# Patient Record
Sex: MALE | Race: ASIAN | NOT HISPANIC OR LATINO | URBAN - METROPOLITAN AREA
[De-identification: names, ages, dates, MRNs, and addresses within clinical notes are randomized per-mention and may not be internally consistent; named-entity substitution may affect disease eponyms.]

---

## 2021-01-22 ENCOUNTER — INPATIENT (INPATIENT)
Facility: HOSPITAL | Age: 71
LOS: 18 days | Discharge: HOME | End: 2021-02-10
Attending: INTERNAL MEDICINE | Admitting: INTERNAL MEDICINE
Payer: MEDICARE

## 2021-01-22 VITALS
DIASTOLIC BLOOD PRESSURE: 79 MMHG | RESPIRATION RATE: 24 BRPM | HEART RATE: 71 BPM | SYSTOLIC BLOOD PRESSURE: 151 MMHG | OXYGEN SATURATION: 95 % | TEMPERATURE: 99 F

## 2021-01-22 DIAGNOSIS — N40.0 BENIGN PROSTATIC HYPERPLASIA WITHOUT LOWER URINARY TRACT SYMPTOMS: ICD-10-CM

## 2021-01-22 DIAGNOSIS — I10 ESSENTIAL (PRIMARY) HYPERTENSION: ICD-10-CM

## 2021-01-22 DIAGNOSIS — E11.22 TYPE 2 DIABETES MELLITUS WITH DIABETIC CHRONIC KIDNEY DISEASE: ICD-10-CM

## 2021-01-22 DIAGNOSIS — K21.9 GASTRO-ESOPHAGEAL REFLUX DISEASE WITHOUT ESOPHAGITIS: ICD-10-CM

## 2021-01-22 DIAGNOSIS — Z29.9 ENCOUNTER FOR PROPHYLACTIC MEASURES, UNSPECIFIED: ICD-10-CM

## 2021-01-22 DIAGNOSIS — U07.1 COVID-19: ICD-10-CM

## 2021-01-22 DIAGNOSIS — E11.9 TYPE 2 DIABETES MELLITUS WITHOUT COMPLICATIONS: ICD-10-CM

## 2021-01-22 DIAGNOSIS — I25.10 ATHEROSCLEROTIC HEART DISEASE OF NATIVE CORONARY ARTERY WITHOUT ANGINA PECTORIS: ICD-10-CM

## 2021-01-22 DIAGNOSIS — E78.5 HYPERLIPIDEMIA, UNSPECIFIED: ICD-10-CM

## 2021-01-22 LAB
A1C WITH ESTIMATED AVERAGE GLUCOSE RESULT: 9.6 % — HIGH (ref 4–5.6)
ALBUMIN SERPL ELPH-MCNC: 3.6 G/DL — SIGNIFICANT CHANGE UP (ref 3.3–5)
ALBUMIN SERPL ELPH-MCNC: 3.8 G/DL — SIGNIFICANT CHANGE UP (ref 3.3–5)
ALP SERPL-CCNC: 56 U/L — SIGNIFICANT CHANGE UP (ref 40–120)
ALP SERPL-CCNC: 65 U/L — SIGNIFICANT CHANGE UP (ref 40–120)
ALT FLD-CCNC: 18 U/L — SIGNIFICANT CHANGE UP (ref 4–41)
ALT FLD-CCNC: 19 U/L — SIGNIFICANT CHANGE UP (ref 4–41)
ANION GAP SERPL CALC-SCNC: 15 MMOL/L — HIGH (ref 7–14)
ANION GAP SERPL CALC-SCNC: 24 MMOL/L — HIGH (ref 7–14)
AST SERPL-CCNC: 48 U/L — HIGH (ref 4–40)
AST SERPL-CCNC: 50 U/L — HIGH (ref 4–40)
BASOPHILS # BLD AUTO: 0.01 K/UL — SIGNIFICANT CHANGE UP (ref 0–0.2)
BASOPHILS NFR BLD AUTO: 0.1 % — SIGNIFICANT CHANGE UP (ref 0–2)
BILIRUB SERPL-MCNC: 0.6 MG/DL — SIGNIFICANT CHANGE UP (ref 0.2–1.2)
BILIRUB SERPL-MCNC: 0.8 MG/DL — SIGNIFICANT CHANGE UP (ref 0.2–1.2)
BLOOD GAS ARTERIAL COMPREHENSIVE RESULT: SIGNIFICANT CHANGE UP
BLOOD GAS ARTERIAL COMPREHENSIVE RESULT: SIGNIFICANT CHANGE UP
BLOOD GAS VENOUS COMPREHENSIVE RESULT: SIGNIFICANT CHANGE UP
BLOOD GAS VENOUS COMPREHENSIVE RESULT: SIGNIFICANT CHANGE UP
BUN SERPL-MCNC: 18 MG/DL — SIGNIFICANT CHANGE UP (ref 7–23)
BUN SERPL-MCNC: 21 MG/DL — SIGNIFICANT CHANGE UP (ref 7–23)
CALCIUM SERPL-MCNC: 9.1 MG/DL — SIGNIFICANT CHANGE UP (ref 8.4–10.5)
CALCIUM SERPL-MCNC: 9.2 MG/DL — SIGNIFICANT CHANGE UP (ref 8.4–10.5)
CHLORIDE SERPL-SCNC: 93 MMOL/L — LOW (ref 98–107)
CHLORIDE SERPL-SCNC: 97 MMOL/L — LOW (ref 98–107)
CO2 SERPL-SCNC: 16 MMOL/L — LOW (ref 22–31)
CO2 SERPL-SCNC: 22 MMOL/L — SIGNIFICANT CHANGE UP (ref 22–31)
CREAT SERPL-MCNC: 1.35 MG/DL — HIGH (ref 0.5–1.3)
CREAT SERPL-MCNC: 1.47 MG/DL — HIGH (ref 0.5–1.3)
CRP SERPL-MCNC: 210.9 MG/L — HIGH
D DIMER BLD IA.RAPID-MCNC: 244 NG/ML DDU — HIGH
D DIMER BLD IA.RAPID-MCNC: 301 NG/ML DDU — HIGH
EOSINOPHIL # BLD AUTO: 0 K/UL — SIGNIFICANT CHANGE UP (ref 0–0.5)
EOSINOPHIL NFR BLD AUTO: 0 % — SIGNIFICANT CHANGE UP (ref 0–6)
ESTIMATED AVERAGE GLUCOSE: 229 MG/DL — HIGH (ref 68–114)
FERRITIN SERPL-MCNC: 705 NG/ML — HIGH (ref 30–400)
GLUCOSE BLDC GLUCOMTR-MCNC: 378 MG/DL — HIGH (ref 70–99)
GLUCOSE BLDC GLUCOMTR-MCNC: 458 MG/DL — CRITICAL HIGH (ref 70–99)
GLUCOSE BLDC GLUCOMTR-MCNC: 463 MG/DL — CRITICAL HIGH (ref 70–99)
GLUCOSE SERPL-MCNC: 313 MG/DL — HIGH (ref 70–99)
GLUCOSE SERPL-MCNC: 488 MG/DL — CRITICAL HIGH (ref 70–99)
HCT VFR BLD CALC: 43.4 % — SIGNIFICANT CHANGE UP (ref 39–50)
HCT VFR BLD CALC: 44 % — SIGNIFICANT CHANGE UP (ref 39–50)
HGB BLD-MCNC: 13.2 G/DL — SIGNIFICANT CHANGE UP (ref 13–17)
HGB BLD-MCNC: 13.3 G/DL — SIGNIFICANT CHANGE UP (ref 13–17)
IANC: 10.55 K/UL — HIGH (ref 1.5–8.5)
IANC: 5.35 K/UL — SIGNIFICANT CHANGE UP (ref 1.5–8.5)
IMM GRANULOCYTES NFR BLD AUTO: 0.8 % — SIGNIFICANT CHANGE UP (ref 0–1.5)
LYMPHOCYTES # BLD AUTO: 1.16 K/UL — SIGNIFICANT CHANGE UP (ref 1–3.3)
LYMPHOCYTES # BLD AUTO: 16.3 % — SIGNIFICANT CHANGE UP (ref 13–44)
MAGNESIUM SERPL-MCNC: 2 MG/DL — SIGNIFICANT CHANGE UP (ref 1.6–2.6)
MCHC RBC-ENTMCNC: 25.7 PG — LOW (ref 27–34)
MCHC RBC-ENTMCNC: 26.1 PG — LOW (ref 27–34)
MCHC RBC-ENTMCNC: 30.2 GM/DL — LOW (ref 32–36)
MCHC RBC-ENTMCNC: 30.4 GM/DL — LOW (ref 32–36)
MCV RBC AUTO: 84.4 FL — SIGNIFICANT CHANGE UP (ref 80–100)
MCV RBC AUTO: 86.3 FL — SIGNIFICANT CHANGE UP (ref 80–100)
MONOCYTES # BLD AUTO: 0.54 K/UL — SIGNIFICANT CHANGE UP (ref 0–0.9)
MONOCYTES NFR BLD AUTO: 7.6 % — SIGNIFICANT CHANGE UP (ref 2–14)
NEUTROPHILS # BLD AUTO: 5.35 K/UL — SIGNIFICANT CHANGE UP (ref 1.8–7.4)
NEUTROPHILS NFR BLD AUTO: 75.2 % — SIGNIFICANT CHANGE UP (ref 43–77)
NRBC # BLD: 0 /100 WBCS — SIGNIFICANT CHANGE UP
NRBC # FLD: 0 K/UL — SIGNIFICANT CHANGE UP
NT-PROBNP SERPL-SCNC: 550 PG/ML — HIGH
PHOSPHATE SERPL-MCNC: 5.7 MG/DL — HIGH (ref 2.5–4.5)
PLATELET # BLD AUTO: 199 K/UL — SIGNIFICANT CHANGE UP (ref 150–400)
PLATELET # BLD AUTO: 269 K/UL — SIGNIFICANT CHANGE UP (ref 150–400)
POTASSIUM SERPL-MCNC: 4.3 MMOL/L — SIGNIFICANT CHANGE UP (ref 3.5–5.3)
POTASSIUM SERPL-MCNC: 4.6 MMOL/L — SIGNIFICANT CHANGE UP (ref 3.5–5.3)
POTASSIUM SERPL-SCNC: 4.3 MMOL/L — SIGNIFICANT CHANGE UP (ref 3.5–5.3)
POTASSIUM SERPL-SCNC: 4.6 MMOL/L — SIGNIFICANT CHANGE UP (ref 3.5–5.3)
PROCALCITONIN SERPL-MCNC: 6.22 NG/ML — HIGH (ref 0.02–0.1)
PROT SERPL-MCNC: 7.4 G/DL — SIGNIFICANT CHANGE UP (ref 6–8.3)
PROT SERPL-MCNC: 7.5 G/DL — SIGNIFICANT CHANGE UP (ref 6–8.3)
RBC # BLD: 5.1 M/UL — SIGNIFICANT CHANGE UP (ref 4.2–5.8)
RBC # BLD: 5.14 M/UL — SIGNIFICANT CHANGE UP (ref 4.2–5.8)
RBC # FLD: 14.4 % — SIGNIFICANT CHANGE UP (ref 10.3–14.5)
RBC # FLD: 14.5 % — SIGNIFICANT CHANGE UP (ref 10.3–14.5)
SARS-COV-2 RNA SPEC QL NAA+PROBE: DETECTED
SODIUM SERPL-SCNC: 133 MMOL/L — LOW (ref 135–145)
SODIUM SERPL-SCNC: 134 MMOL/L — LOW (ref 135–145)
TROPONIN T, HIGH SENSITIVITY RESULT: 18 NG/L — SIGNIFICANT CHANGE UP
WBC # BLD: 12.51 K/UL — HIGH (ref 3.8–10.5)
WBC # BLD: 7.12 K/UL — SIGNIFICANT CHANGE UP (ref 3.8–10.5)
WBC # FLD AUTO: 12.51 K/UL — HIGH (ref 3.8–10.5)
WBC # FLD AUTO: 7.12 K/UL — SIGNIFICANT CHANGE UP (ref 3.8–10.5)

## 2021-01-22 PROCEDURE — 99223 1ST HOSP IP/OBS HIGH 75: CPT

## 2021-01-22 PROCEDURE — 71045 X-RAY EXAM CHEST 1 VIEW: CPT | Mod: 26

## 2021-01-22 PROCEDURE — 93010 ELECTROCARDIOGRAM REPORT: CPT | Mod: 59

## 2021-01-22 PROCEDURE — 76937 US GUIDE VASCULAR ACCESS: CPT | Mod: 26

## 2021-01-22 PROCEDURE — 36620 INSERTION CATHETER ARTERY: CPT

## 2021-01-22 PROCEDURE — 71045 X-RAY EXAM CHEST 1 VIEW: CPT | Mod: 26,77

## 2021-01-22 PROCEDURE — 36556 INSERT NON-TUNNEL CV CATH: CPT

## 2021-01-22 PROCEDURE — 99285 EMERGENCY DEPT VISIT HI MDM: CPT | Mod: 25

## 2021-01-22 PROCEDURE — 31500 INSERT EMERGENCY AIRWAY: CPT | Mod: 59

## 2021-01-22 RX ORDER — INSULIN LISPRO 100/ML
VIAL (ML) SUBCUTANEOUS
Refills: 0 | Status: DISCONTINUED | OUTPATIENT
Start: 2021-01-22 | End: 2021-01-22

## 2021-01-22 RX ORDER — ENOXAPARIN SODIUM 100 MG/ML
40 INJECTION SUBCUTANEOUS DAILY
Refills: 0 | Status: DISCONTINUED | OUTPATIENT
Start: 2021-01-22 | End: 2021-02-10

## 2021-01-22 RX ORDER — CISATRACURIUM BESYLATE 2 MG/ML
20 INJECTION INTRAVENOUS ONCE
Refills: 0 | Status: COMPLETED | OUTPATIENT
Start: 2021-01-22 | End: 2021-01-22

## 2021-01-22 RX ORDER — ROCURONIUM BROMIDE 10 MG/ML
100 VIAL (ML) INTRAVENOUS ONCE
Refills: 0 | Status: COMPLETED | OUTPATIENT
Start: 2021-01-22 | End: 2021-01-22

## 2021-01-22 RX ORDER — REMDESIVIR 5 MG/ML
100 INJECTION INTRAVENOUS EVERY 24 HOURS
Refills: 0 | Status: COMPLETED | OUTPATIENT
Start: 2021-01-23 | End: 2021-01-26

## 2021-01-22 RX ORDER — GLUCAGON INJECTION, SOLUTION 0.5 MG/.1ML
1 INJECTION, SOLUTION SUBCUTANEOUS ONCE
Refills: 0 | Status: DISCONTINUED | OUTPATIENT
Start: 2021-01-22 | End: 2021-02-10

## 2021-01-22 RX ORDER — REMDESIVIR 5 MG/ML
INJECTION INTRAVENOUS
Refills: 0 | Status: COMPLETED | OUTPATIENT
Start: 2021-01-22 | End: 2021-01-26

## 2021-01-22 RX ORDER — INSULIN GLARGINE 100 [IU]/ML
50 INJECTION, SOLUTION SUBCUTANEOUS AT BEDTIME
Refills: 0 | Status: DISCONTINUED | OUTPATIENT
Start: 2021-01-22 | End: 2021-01-22

## 2021-01-22 RX ORDER — PANTOPRAZOLE SODIUM 20 MG/1
40 TABLET, DELAYED RELEASE ORAL
Refills: 0 | Status: DISCONTINUED | OUTPATIENT
Start: 2021-01-22 | End: 2021-01-22

## 2021-01-22 RX ORDER — INSULIN HUMAN 100 [IU]/ML
14 INJECTION, SOLUTION SUBCUTANEOUS
Qty: 100 | Refills: 0 | Status: DISCONTINUED | OUTPATIENT
Start: 2021-01-22 | End: 2021-01-23

## 2021-01-22 RX ORDER — SODIUM CHLORIDE 9 MG/ML
10 INJECTION INTRAMUSCULAR; INTRAVENOUS; SUBCUTANEOUS
Refills: 0 | Status: DISCONTINUED | OUTPATIENT
Start: 2021-01-22 | End: 2021-01-29

## 2021-01-22 RX ORDER — DEXTROSE 50 % IN WATER 50 %
12.5 SYRINGE (ML) INTRAVENOUS ONCE
Refills: 0 | Status: DISCONTINUED | OUTPATIENT
Start: 2021-01-22 | End: 2021-01-22

## 2021-01-22 RX ORDER — MIDAZOLAM HYDROCHLORIDE 1 MG/ML
0.02 INJECTION, SOLUTION INTRAMUSCULAR; INTRAVENOUS
Qty: 100 | Refills: 0 | Status: DISCONTINUED | OUTPATIENT
Start: 2021-01-22 | End: 2021-01-25

## 2021-01-22 RX ORDER — SODIUM CHLORIDE 9 MG/ML
1000 INJECTION, SOLUTION INTRAVENOUS
Refills: 0 | Status: DISCONTINUED | OUTPATIENT
Start: 2021-01-22 | End: 2021-01-22

## 2021-01-22 RX ORDER — AMLODIPINE BESYLATE 2.5 MG/1
2.5 TABLET ORAL DAILY
Refills: 0 | Status: DISCONTINUED | OUTPATIENT
Start: 2021-01-22 | End: 2021-01-22

## 2021-01-22 RX ORDER — TAMSULOSIN HYDROCHLORIDE 0.4 MG/1
0.4 CAPSULE ORAL AT BEDTIME
Refills: 0 | Status: DISCONTINUED | OUTPATIENT
Start: 2021-01-22 | End: 2021-02-10

## 2021-01-22 RX ORDER — INSULIN LISPRO 100/ML
VIAL (ML) SUBCUTANEOUS EVERY 6 HOURS
Refills: 0 | Status: DISCONTINUED | OUTPATIENT
Start: 2021-01-22 | End: 2021-01-22

## 2021-01-22 RX ORDER — ETOMIDATE 2 MG/ML
30 INJECTION INTRAVENOUS ONCE
Refills: 0 | Status: DISCONTINUED | OUTPATIENT
Start: 2021-01-22 | End: 2021-01-22

## 2021-01-22 RX ORDER — CHLORHEXIDINE GLUCONATE 213 G/1000ML
1 SOLUTION TOPICAL
Refills: 0 | Status: DISCONTINUED | OUTPATIENT
Start: 2021-01-22 | End: 2021-01-31

## 2021-01-22 RX ORDER — SIMVASTATIN 20 MG/1
1 TABLET, FILM COATED ORAL
Qty: 0 | Refills: 0 | DISCHARGE

## 2021-01-22 RX ORDER — DEXTROSE 50 % IN WATER 50 %
25 SYRINGE (ML) INTRAVENOUS ONCE
Refills: 0 | Status: DISCONTINUED | OUTPATIENT
Start: 2021-01-22 | End: 2021-01-22

## 2021-01-22 RX ORDER — GLUCAGON INJECTION, SOLUTION 0.5 MG/.1ML
1 INJECTION, SOLUTION SUBCUTANEOUS ONCE
Refills: 0 | Status: DISCONTINUED | OUTPATIENT
Start: 2021-01-22 | End: 2021-01-22

## 2021-01-22 RX ORDER — SIMVASTATIN 20 MG/1
20 TABLET, FILM COATED ORAL AT BEDTIME
Refills: 0 | Status: DISCONTINUED | OUTPATIENT
Start: 2021-01-22 | End: 2021-02-10

## 2021-01-22 RX ORDER — GLUCAGON INJECTION, SOLUTION 0.5 MG/.1ML
1 INJECTION, SOLUTION SUBCUTANEOUS ONCE
Refills: 0 | Status: DISCONTINUED | OUTPATIENT
Start: 2021-01-22 | End: 2021-01-29

## 2021-01-22 RX ORDER — HUMAN INSULIN 100 [IU]/ML
22 INJECTION, SUSPENSION SUBCUTANEOUS EVERY 6 HOURS
Refills: 0 | Status: DISCONTINUED | OUTPATIENT
Start: 2021-01-22 | End: 2021-01-22

## 2021-01-22 RX ORDER — SODIUM CHLORIDE 9 MG/ML
1000 INJECTION, SOLUTION INTRAVENOUS ONCE
Refills: 0 | Status: COMPLETED | OUTPATIENT
Start: 2021-01-22 | End: 2021-01-22

## 2021-01-22 RX ORDER — FUROSEMIDE 40 MG
1 TABLET ORAL
Qty: 0 | Refills: 0 | DISCHARGE

## 2021-01-22 RX ORDER — ASPIRIN/CALCIUM CARB/MAGNESIUM 324 MG
81 TABLET ORAL DAILY
Refills: 0 | Status: DISCONTINUED | OUTPATIENT
Start: 2021-01-22 | End: 2021-02-10

## 2021-01-22 RX ORDER — NOREPINEPHRINE BITARTRATE/D5W 8 MG/250ML
0.05 PLASTIC BAG, INJECTION (ML) INTRAVENOUS
Qty: 8 | Refills: 0 | Status: DISCONTINUED | OUTPATIENT
Start: 2021-01-22 | End: 2021-01-24

## 2021-01-22 RX ORDER — DEXTROSE 50 % IN WATER 50 %
15 SYRINGE (ML) INTRAVENOUS ONCE
Refills: 0 | Status: DISCONTINUED | OUTPATIENT
Start: 2021-01-22 | End: 2021-01-22

## 2021-01-22 RX ORDER — GABAPENTIN 400 MG/1
600 CAPSULE ORAL
Refills: 0 | Status: DISCONTINUED | OUTPATIENT
Start: 2021-01-22 | End: 2021-01-22

## 2021-01-22 RX ORDER — DEXAMETHASONE 0.5 MG/5ML
6 ELIXIR ORAL DAILY
Refills: 0 | Status: DISCONTINUED | OUTPATIENT
Start: 2021-01-23 | End: 2021-01-27

## 2021-01-22 RX ORDER — INSULIN LISPRO 100/ML
VIAL (ML) SUBCUTANEOUS AT BEDTIME
Refills: 0 | Status: DISCONTINUED | OUTPATIENT
Start: 2021-01-22 | End: 2021-01-22

## 2021-01-22 RX ORDER — ACETAMINOPHEN 500 MG
650 TABLET ORAL ONCE
Refills: 0 | Status: COMPLETED | OUTPATIENT
Start: 2021-01-22 | End: 2021-01-22

## 2021-01-22 RX ORDER — ACETAMINOPHEN 500 MG
650 TABLET ORAL EVERY 4 HOURS
Refills: 0 | Status: DISCONTINUED | OUTPATIENT
Start: 2021-01-22 | End: 2021-01-22

## 2021-01-22 RX ORDER — METOPROLOL TARTRATE 50 MG
100 TABLET ORAL DAILY
Refills: 0 | Status: DISCONTINUED | OUTPATIENT
Start: 2021-01-22 | End: 2021-01-22

## 2021-01-22 RX ORDER — CLOPIDOGREL BISULFATE 75 MG/1
1 TABLET, FILM COATED ORAL
Qty: 0 | Refills: 0 | DISCHARGE

## 2021-01-22 RX ORDER — DEXAMETHASONE 0.5 MG/5ML
6 ELIXIR ORAL ONCE
Refills: 0 | Status: COMPLETED | OUTPATIENT
Start: 2021-01-22 | End: 2021-01-22

## 2021-01-22 RX ORDER — CISATRACURIUM BESYLATE 2 MG/ML
3 INJECTION INTRAVENOUS
Qty: 200 | Refills: 0 | Status: DISCONTINUED | OUTPATIENT
Start: 2021-01-22 | End: 2021-01-28

## 2021-01-22 RX ORDER — GABAPENTIN 400 MG/1
1 CAPSULE ORAL
Qty: 0 | Refills: 0 | DISCHARGE

## 2021-01-22 RX ORDER — CHLORHEXIDINE GLUCONATE 213 G/1000ML
15 SOLUTION TOPICAL EVERY 12 HOURS
Refills: 0 | Status: DISCONTINUED | OUTPATIENT
Start: 2021-01-22 | End: 2021-01-31

## 2021-01-22 RX ORDER — FENOFIBRATE,MICRONIZED 130 MG
1 CAPSULE ORAL
Qty: 0 | Refills: 0 | DISCHARGE

## 2021-01-22 RX ORDER — AMLODIPINE BESYLATE 2.5 MG/1
1 TABLET ORAL
Qty: 0 | Refills: 0 | DISCHARGE

## 2021-01-22 RX ORDER — PROPOFOL 10 MG/ML
30 INJECTION, EMULSION INTRAVENOUS
Qty: 1000 | Refills: 0 | Status: DISCONTINUED | OUTPATIENT
Start: 2021-01-22 | End: 2021-01-25

## 2021-01-22 RX ORDER — INSULIN LISPRO 100/ML
15 VIAL (ML) SUBCUTANEOUS
Refills: 0 | Status: DISCONTINUED | OUTPATIENT
Start: 2021-01-22 | End: 2021-01-22

## 2021-01-22 RX ORDER — FENTANYL CITRATE 50 UG/ML
100 INJECTION INTRAVENOUS ONCE
Refills: 0 | Status: DISCONTINUED | OUTPATIENT
Start: 2021-01-22 | End: 2021-01-22

## 2021-01-22 RX ORDER — FENOFIBRATE,MICRONIZED 130 MG
145 CAPSULE ORAL DAILY
Refills: 0 | Status: DISCONTINUED | OUTPATIENT
Start: 2021-01-22 | End: 2021-02-10

## 2021-01-22 RX ORDER — TAMSULOSIN HYDROCHLORIDE 0.4 MG/1
1 CAPSULE ORAL
Qty: 0 | Refills: 0 | DISCHARGE

## 2021-01-22 RX ORDER — REMDESIVIR 5 MG/ML
200 INJECTION INTRAVENOUS EVERY 24 HOURS
Refills: 0 | Status: COMPLETED | OUTPATIENT
Start: 2021-01-22 | End: 2021-01-22

## 2021-01-22 RX ORDER — MIDAZOLAM HYDROCHLORIDE 1 MG/ML
4 INJECTION, SOLUTION INTRAMUSCULAR; INTRAVENOUS ONCE
Refills: 0 | Status: DISCONTINUED | OUTPATIENT
Start: 2021-01-22 | End: 2021-01-22

## 2021-01-22 RX ORDER — OMEPRAZOLE 10 MG/1
1 CAPSULE, DELAYED RELEASE ORAL
Qty: 0 | Refills: 0 | DISCHARGE

## 2021-01-22 RX ORDER — SIMVASTATIN 20 MG/1
40 TABLET, FILM COATED ORAL AT BEDTIME
Refills: 0 | Status: DISCONTINUED | OUTPATIENT
Start: 2021-01-22 | End: 2021-01-22

## 2021-01-22 RX ORDER — METOPROLOL TARTRATE 50 MG
1 TABLET ORAL
Qty: 0 | Refills: 0 | DISCHARGE

## 2021-01-22 RX ORDER — FENTANYL CITRATE 50 UG/ML
0.5 INJECTION INTRAVENOUS
Qty: 2500 | Refills: 0 | Status: DISCONTINUED | OUTPATIENT
Start: 2021-01-22 | End: 2021-01-24

## 2021-01-22 RX ORDER — CLOPIDOGREL BISULFATE 75 MG/1
75 TABLET, FILM COATED ORAL DAILY
Refills: 0 | Status: DISCONTINUED | OUTPATIENT
Start: 2021-01-22 | End: 2021-02-10

## 2021-01-22 RX ADMIN — MIDAZOLAM HYDROCHLORIDE 1.65 MG/KG/HR: 1 INJECTION, SOLUTION INTRAMUSCULAR; INTRAVENOUS at 19:00

## 2021-01-22 RX ADMIN — FENTANYL CITRATE 4.13 MICROGRAM(S)/KG/HR: 50 INJECTION INTRAVENOUS at 18:45

## 2021-01-22 RX ADMIN — FENTANYL CITRATE 100 MICROGRAM(S): 50 INJECTION INTRAVENOUS at 18:36

## 2021-01-22 RX ADMIN — CISATRACURIUM BESYLATE 14.9 MICROGRAM(S)/KG/MIN: 2 INJECTION INTRAVENOUS at 23:16

## 2021-01-22 RX ADMIN — REMDESIVIR 500 MILLIGRAM(S): 5 INJECTION INTRAVENOUS at 23:02

## 2021-01-22 RX ADMIN — Medication 100 MILLIGRAM(S): at 13:55

## 2021-01-22 RX ADMIN — CLOPIDOGREL BISULFATE 75 MILLIGRAM(S): 75 TABLET, FILM COATED ORAL at 13:55

## 2021-01-22 RX ADMIN — PROPOFOL 15.5 MICROGRAM(S)/KG/MIN: 10 INJECTION, EMULSION INTRAVENOUS at 22:00

## 2021-01-22 RX ADMIN — Medication 145 MILLIGRAM(S): at 13:55

## 2021-01-22 RX ADMIN — SODIUM CHLORIDE 1000 MILLILITER(S): 9 INJECTION, SOLUTION INTRAVENOUS at 10:13

## 2021-01-22 RX ADMIN — Medication 5: at 18:36

## 2021-01-22 RX ADMIN — CISATRACURIUM BESYLATE 20 MILLIGRAM(S): 2 INJECTION INTRAVENOUS at 23:16

## 2021-01-22 RX ADMIN — Medication 650 MILLIGRAM(S): at 10:12

## 2021-01-22 RX ADMIN — Medication 7.74 MICROGRAM(S)/KG/MIN: at 18:50

## 2021-01-22 RX ADMIN — Medication 6 MILLIGRAM(S): at 10:12

## 2021-01-22 RX ADMIN — AMLODIPINE BESYLATE 2.5 MILLIGRAM(S): 2.5 TABLET ORAL at 13:55

## 2021-01-22 RX ADMIN — INSULIN HUMAN 8 UNIT(S)/HR: 100 INJECTION, SOLUTION SUBCUTANEOUS at 22:06

## 2021-01-22 RX ADMIN — MIDAZOLAM HYDROCHLORIDE 4 MILLIGRAM(S): 1 INJECTION, SOLUTION INTRAMUSCULAR; INTRAVENOUS at 19:16

## 2021-01-22 RX ADMIN — PROPOFOL 15.5 MICROGRAM(S)/KG/MIN: 10 INJECTION, EMULSION INTRAVENOUS at 17:13

## 2021-01-22 NOTE — H&P ADULT - NSHPPHYSICALEXAM_GEN_ALL_CORE
Vital Signs Last 24 Hrs  T(C): 37.1 (22 Jan 2021 09:09), Max: 37.1 (22 Jan 2021 09:09)  T(F): 98.7 (22 Jan 2021 09:09), Max: 98.7 (22 Jan 2021 09:09)  HR: 68 (22 Jan 2021 10:13) (68 - 71)  BP: 171/69 (22 Jan 2021 10:13) (151/79 - 171/69)  BP(mean): --  RR: 31 (22 Jan 2021 10:13) (24 - 31)  SpO2: 94% (22 Jan 2021 10:13) (94% - 95%)    Gen- In bed, appeared uncomfortable. Speaking full sentences.   Eyes- EOMI, PERRLA, nonicteric.  EMNT- Fair dentition. MMM. No tonsilar exudates. No posterior pharynx erythema.  Neck- Supple. No masses. No tracheal deviation.  Resp- B/l rales. Good effort. No wheezes. No accessory muscle use.  CVS- RRR, S1S2, no g/r/m. No LE edema.  GI- Soft obese abd, NT, ND, +BSx4. No HSM.  MSK- No C/C. ROM intact. No crepitus.  Neuro- CN II-XII intact. Speech fluent/face symmetric. Sensation intact.  Skin- No rashes/ulcers. Warm/moist.  Psych- AAOx3. Appropriate mood/affect.

## 2021-01-22 NOTE — H&P ADULT - NSICDXPASTMEDICALHX_GEN_ALL_CORE_FT
PAST MEDICAL HISTORY:  BPH (benign prostatic hyperplasia)     CAD (coronary artery disease) stent x3 2015    CKD stage 3 secondary to diabetes     GERD (gastroesophageal reflux disease)     HLD (hyperlipidemia)     HTN (hypertension)     Type 2 diabetes mellitus

## 2021-01-22 NOTE — H&P ADULT - PROBLEM SELECTOR PLAN 1
-C/b acute hypoxic respiratory failure.  -Currently on 10L by NRB. States he is breathing slightly easier since arriving.  -Airborne iso. Prone positioning. Discussed severity of infection and concerns for decompensation. He is ok w/ intubation. He is FULL CODE.  -Cont decadron (1/22-)  -Starting remdesivir (pending wt/CrCl calculation).  -Given productive cough/high procal - will check sputum cx. Defer on abx at this time. If no improvement, can consider empiric abx coverage.  -Monitor inflammatory marker.

## 2021-01-22 NOTE — H&P ADULT - HISTORY OF PRESENT ILLNESS
CAD s/p stent (>10yr ago)  DM2  HTN  HLD      CPW [place]    No toxic habits, social ETOH  Lives children  Retired.     FHx: None. 70M with PMH CAD s/p stents x3 (2015), DM2, HTN, HLD, BPH who presents to the hospital with worsening SOB. Per pt - he started feeling unwell on Wednesday and decided to get tested for COVID, which came back positive. He denied any travels/sick contacts. He reports usually walking around his block and suspect that's how he caught it. He lives with his family, but no one else is sick. They have all tested negative so far. He has associated diarrhea that started around the same time. He also reports subjective fevers at home which resolved w/ tylenol. Because SOB has gotten worse along w/ MORTON - he decided to come to the hospital for evaluation.  Otherwise he denied sore throat, CP, palps, abd pain, N/V, dysuria. He does have a productive cough w/ grayish sputum.     ED Course:  151/79, 71,24-33, 98.7F, 95% (10L NRB)  Received Dex 6, APAP, LR 1L

## 2021-01-22 NOTE — CHART NOTE - NSCHARTNOTEFT_GEN_A_CORE
MICU Accept Note    CHIEF COMPLAINT:     HPI / INTERVAL HISTORY:  70M with PMH CAD s/p stents x3 (2015), DM2, HTN, HLD, BPH who presents to the hospital with worsening SOB. Per pt - he started feeling unwell on Wednesday and decided to get tested for COVID, which came back positive. He denied any travels/sick contacts. He reports usually walking around his block and suspect that's how he caught it. He lives with his family, but no one else is sick. They have all tested negative so far. He has associated diarrhea that started around the same time. He also reports subjective fevers at home which resolved w/ tylenol. Because SOB has gotten worse along w/ MORTON - he decided to come to the hospital for evaluation.  Otherwise he denied sore throat, CP, palps, abd pain, N/V, dysuria. He does have a productive cough w/ grayish sputum.     ED Course:  151/79, 71,24-33, 98.7F, 95% (10L NRB)  Received Dex 6, APAP, LR 1L    PAST MEDICAL & SURGICAL HISTORY:  CKD stage 3 secondary to diabetes    GERD (gastroesophageal reflux disease)  HLD (hyperlipidemia)  BPH (benign prostatic hyperplasia)  Type 2 diabetes mellitus  CAD (coronary artery disease)  stent x3 2015  HTN (hypertension)  No significant past surgical history    FAMILY HISTORY:  No pertinent family history in first degree relatives    SOCIAL HISTORY:  Lives with children.  No tobacco, drug use. Social ETOH.  Retired. Independent w/ ADLs.  HOME MEDICATIONS:      Allergies    No Known Allergies    Intolerances          REVIEW OF SYSTEMS:  [ ] Unable to assess ROS because     OBJECTIVE:  ICU Vital Signs Last 24 Hrs  T(C): 36.7 (22 Jan 2021 13:40), Max: 37.1 (22 Jan 2021 09:09)  T(F): 98 (22 Jan 2021 13:40), Max: 98.7 (22 Jan 2021 09:09)  HR: 89 (22 Jan 2021 15:58) (68 - 89)  BP: 189/74 (22 Jan 2021 15:58) (151/79 - 210/86)  BP(mean): --  ABP: --  ABP(mean): --  RR: 37 (22 Jan 2021 13:40) (24 - 37)  SpO2: 92% (22 Jan 2021 15:58) (89% - 95%)        CAPILLARY BLOOD GLUCOSE          PHYSICAL EXAM:  General:   HEENT:   Neck:   Chest/Lungs:  Heart:  Abdomen:   Extremities:   Skin:   Neuro:   Psych:     LINES:     HOSPITAL MEDICATIONS:  MEDICATIONS  (STANDING):  amLODIPine   Tablet 2.5 milliGRAM(s) Oral daily  clopidogrel Tablet 75 milliGRAM(s) Oral daily  dextrose 40% Gel 15 Gram(s) Oral once  dextrose 5%. 1000 milliLiter(s) (50 mL/Hr) IV Continuous <Continuous>  dextrose 5%. 1000 milliLiter(s) (100 mL/Hr) IV Continuous <Continuous>  dextrose 50% Injectable 25 Gram(s) IV Push once  dextrose 50% Injectable 12.5 Gram(s) IV Push once  dextrose 50% Injectable 25 Gram(s) IV Push once  enoxaparin Injectable 40 milliGRAM(s) SubCutaneous daily  fenofibrate Tablet 145 milliGRAM(s) Oral daily  gabapentin 600 milliGRAM(s) Oral two times a day  glucagon  Injectable 1 milliGRAM(s) IntraMuscular once  insulin glargine Injectable (LANTUS) 50 Unit(s) SubCutaneous at bedtime  insulin lispro (ADMELOG) corrective regimen sliding scale   SubCutaneous three times a day before meals  insulin lispro (ADMELOG) corrective regimen sliding scale   SubCutaneous at bedtime  insulin lispro Injectable (ADMELOG) 15 Unit(s) SubCutaneous three times a day before meals  metoprolol succinate  milliGRAM(s) Oral daily  pantoprazole    Tablet 40 milliGRAM(s) Oral before breakfast  remdesivir  IVPB   IV Intermittent   remdesivir  IVPB 200 milliGRAM(s) IV Intermittent every 24 hours  simvastatin 20 milliGRAM(s) Oral at bedtime  tamsulosin 0.4 milliGRAM(s) Oral at bedtime    MEDICATIONS  (PRN):  acetaminophen   Tablet .. 650 milliGRAM(s) Oral every 4 hours PRN Temp greater or equal to 38.5C (101.3F)  acetaminophen  Suppository .. 650 milliGRAM(s) Rectal every 4 hours PRN Temp greater or equal to 38.5C (101.3F)  benzonatate 100 milliGRAM(s) Oral three times a day PRN Cough      LABS:                        13.2   7.12  )-----------( 199      ( 22 Jan 2021 10:16 )             43.4     Hgb Trend: 13.2<--  01-22    134<L>  |  97<L>  |  18  ----------------------------<  313<H>  4.3   |  22  |  1.35<H>    Ca    9.2      22 Jan 2021 11:00    TPro  7.5  /  Alb  3.8  /  TBili  0.6  /  DBili  x   /  AST  48<H>  /  ALT  19  /  AlkPhos  56  01-22    Creatinine Trend: 1.35<--        Venous Blood Gas:  01-22 @ 13:19  7.38/37/33/21/60.7  VBG Lactate: 2.5  Venous Blood Gas:  01-22 @ 10:16  7.38/41/<24/22/15.7  VBG Lactate: 2.5      MICROBIOLOGY:     RADIOLOGY & ADDITIONAL TESTS:        Em Rinaldi MD  Internal Medicine PGY1  Pager: [phone] ([facility]), [phone] ([facility]) MICU Accept Note    CHIEF COMPLAINT:     HPI / INTERVAL HISTORY:  70M with PMH CAD s/p stents x3 (2015), DM2, HTN, HLD, BPH who presents to the hospital with worsening SOB. Per pt - he started feeling unwell on Wednesday and decided to get tested for COVID, which came back positive. He denied any travels/sick contacts. He reports usually walking around his block and suspect that's how he caught it. He lives with his family, but no one else is sick. They have all tested negative so far. He has associated diarrhea that started around the same time. He also reports subjective fevers at home which resolved w/ tylenol. Because SOB has gotten worse along w/ MORTON - he decided to come to the hospital for evaluation.  Otherwise he denied sore throat, CP, palps, abd pain, N/V, dysuria. He does have a productive cough w/ grayish sputum.     ED Course:  151/79, 71,24-33, 98.7F, 95% (10L NRB)  Received Dex 6, APAP, LR 1L    PAST MEDICAL & SURGICAL HISTORY:  CKD stage 3 secondary to diabetes    GERD (gastroesophageal reflux disease)  HLD (hyperlipidemia)  BPH (benign prostatic hyperplasia)  Type 2 diabetes mellitus  CAD (coronary artery disease)  stent x3 2015  HTN (hypertension)  No significant past surgical history    FAMILY HISTORY:  No pertinent family history in first degree relatives    SOCIAL HISTORY:  Lives with children.  No tobacco, drug use. Social ETOH.  Retired. Independent w/ ADLs.  HOME MEDICATIONS:      Allergies    No Known Allergies    Intolerances    REVIEW OF SYSTEMS:  Unable to obtain 2/2 intubation / sedation      OBJECTIVE:  ICU Vital Signs Last 24 Hrs  T(C): 36.7 (22 Jan 2021 13:40), Max: 37.1 (22 Jan 2021 09:09)  T(F): 98 (22 Jan 2021 13:40), Max: 98.7 (22 Jan 2021 09:09)  HR: 89 (22 Jan 2021 15:58) (68 - 89)  BP: 189/74 (22 Jan 2021 15:58) (151/79 - 210/86)  RR: 37 (22 Jan 2021 13:40) (24 - 37)  SpO2: 92% (22 Jan 2021 15:58) (89% - 95%)    CAPILLARY BLOOD GLUCOSE    PHYSICAL EXAM:  General:   HEENT:   Neck:   Chest/Lungs:  Heart:  Abdomen:   Extremities:   Skin:   Neuro:   Psych:     LINES:     HOSPITAL MEDICATIONS:  MEDICATIONS  (STANDING):  amLODIPine   Tablet 2.5 milliGRAM(s) Oral daily  clopidogrel Tablet 75 milliGRAM(s) Oral daily  dextrose 40% Gel 15 Gram(s) Oral once  dextrose 5%. 1000 milliLiter(s) (50 mL/Hr) IV Continuous <Continuous>  dextrose 5%. 1000 milliLiter(s) (100 mL/Hr) IV Continuous <Continuous>  dextrose 50% Injectable 25 Gram(s) IV Push once  dextrose 50% Injectable 12.5 Gram(s) IV Push once  dextrose 50% Injectable 25 Gram(s) IV Push once  enoxaparin Injectable 40 milliGRAM(s) SubCutaneous daily  fenofibrate Tablet 145 milliGRAM(s) Oral daily  gabapentin 600 milliGRAM(s) Oral two times a day  glucagon  Injectable 1 milliGRAM(s) IntraMuscular once  insulin glargine Injectable (LANTUS) 50 Unit(s) SubCutaneous at bedtime  insulin lispro (ADMELOG) corrective regimen sliding scale   SubCutaneous three times a day before meals  insulin lispro (ADMELOG) corrective regimen sliding scale   SubCutaneous at bedtime  insulin lispro Injectable (ADMELOG) 15 Unit(s) SubCutaneous three times a day before meals  metoprolol succinate  milliGRAM(s) Oral daily  pantoprazole    Tablet 40 milliGRAM(s) Oral before breakfast  remdesivir  IVPB   IV Intermittent   remdesivir  IVPB 200 milliGRAM(s) IV Intermittent every 24 hours  simvastatin 20 milliGRAM(s) Oral at bedtime  tamsulosin 0.4 milliGRAM(s) Oral at bedtime    MEDICATIONS  (PRN):  acetaminophen   Tablet .. 650 milliGRAM(s) Oral every 4 hours PRN Temp greater or equal to 38.5C (101.3F)  acetaminophen  Suppository .. 650 milliGRAM(s) Rectal every 4 hours PRN Temp greater or equal to 38.5C (101.3F)  benzonatate 100 milliGRAM(s) Oral three times a day PRN Cough      LABS:                        13.2   7.12  )-----------( 199      ( 22 Jan 2021 10:16 )             43.4     Hgb Trend: 13.2<--  01-22    134<L>  |  97<L>  |  18  ----------------------------<  313<H>  4.3   |  22  |  1.35<H>    Ca    9.2      22 Jan 2021 11:00    TPro  7.5  /  Alb  3.8  /  TBili  0.6  /  DBili  x   /  AST  48<H>  /  ALT  19  /  AlkPhos  56  01-22    Creatinine Trend: 1.35<--        Venous Blood Gas:  01-22 @ 13:19  7.38/37/33/21/60.7  VBG Lactate: 2.5  Venous Blood Gas:  01-22 @ 10:16  7.38/41/<24/22/15.7  VBG Lactate: 2.5      MICROBIOLOGY:     RADIOLOGY & ADDITIONAL TESTS:    ASSESSMENT & PLAN:     NEUROLOGIC:  - Sedation:     SKIN:  - Lines:  - Decubiti:    GI:  - Diet:  - Bowel regiment:    Urinary tract:  - UO:   - Oswald:       METABOLIC:  BMP showing evidence of   Liver functions tests show   Endocrine abnormalities include  01-22    134<L>  |  97<L>  |  18  ----------------------------<  313<H>  4.3   |  22  |  1.35<H>    Ca    9.2      22 Jan 2021 11:00    TPro  7.5  /  Alb  3.8  /  TBili  0.6  /  DBili  x   /  AST  48<H>  /  ALT  19  /  AlkPhos  56  01-22      VOLUME ASSESSMENT:  No peripheral edema  No evidence of disturbance of effective circulating volume    HEMATOLOGIC:  CBC results show  Coag panel shows  DVT prophylaxis with                         13.2   7.12  )-----------( 199      ( 22 Jan 2021 10:16 )             43.4         INFECTIOUS DISEASE:  Pt without strong objective or clinical evidence of infection. Will observe off antibiotics    HEMODYNAMICS:  Patient is on pressors due to ____ shock (cardiogenic due to muscle or valve failure, obstructive due to peff, PE, PTX, hypovolemic, vasopegic)     CARDIOVASCULAR:      RESPIRATORY:  # Hypoxic Respiratory Failure 2/2 COVID-19 PNA  - Intubated 1/22  - Vent Settings:   - MICU Accept Note    CHIEF COMPLAINT:     HPI / INTERVAL HISTORY:  70M with PMH CAD s/p stents x3 (2015), DM2, HTN, HLD, BPH who presents to the hospital with worsening SOB. Per pt - he started feeling unwell on Wednesday and decided to get tested for COVID, which came back positive. He denied any travels/sick contacts. He reports usually walking around his block and suspect that's how he caught it. He lives with his family, but no one else is sick. They have all tested negative so far. He has associated diarrhea that started around the same time. He also reports subjective fevers at home which resolved w/ tylenol. Because SOB has gotten worse along w/ MORTON - he decided to come to the hospital for evaluation.  Otherwise he denied sore throat, CP, palps, abd pain, N/V, dysuria. He does have a productive cough w/ grayish sputum.     ED Course:  151/79, 71,24-33, 98.7F, 95% (10L NRB)  Received Dex 6, APAP, LR 1L    PAST MEDICAL & SURGICAL HISTORY:  CKD stage 3 secondary to diabetes    GERD (gastroesophageal reflux disease)  HLD (hyperlipidemia)  BPH (benign prostatic hyperplasia)  Type 2 diabetes mellitus  CAD (coronary artery disease)  stent x3 2015  HTN (hypertension)  No significant past surgical history    FAMILY HISTORY:  No pertinent family history in first degree relatives    SOCIAL HISTORY:  Lives with children.  No tobacco, drug use. Social ETOH.  Retired. Independent w/ ADLs.  HOME MEDICATIONS:      Allergies  No Known Allergies    Intolerances    REVIEW OF SYSTEMS:  Unable to obtain 2/2 intubation / sedation      OBJECTIVE:  ICU Vital Signs Last 24 Hrs  T(C): 36.7 (22 Jan 2021 13:40), Max: 37.1 (22 Jan 2021 09:09)  T(F): 98 (22 Jan 2021 13:40), Max: 98.7 (22 Jan 2021 09:09)  HR: 89 (22 Jan 2021 15:58) (68 - 89)  BP: 189/74 (22 Jan 2021 15:58) (151/79 - 210/86)  RR: 37 (22 Jan 2021 13:40) (24 - 37)  SpO2: 92% (22 Jan 2021 15:58) (89% - 95%)    CAPILLARY BLOOD GLUCOSE    PHYSICAL EXAM:  General:   HEENT:   Neck:   Chest/Lungs:  Heart:  Abdomen:   Extremities:   Skin:   Neuro:   Psych:     LINES:     HOSPITAL MEDICATIONS:  MEDICATIONS  (STANDING):  amLODIPine   Tablet 2.5 milliGRAM(s) Oral daily  clopidogrel Tablet 75 milliGRAM(s) Oral daily  dextrose 40% Gel 15 Gram(s) Oral once  dextrose 5%. 1000 milliLiter(s) (50 mL/Hr) IV Continuous <Continuous>  dextrose 5%. 1000 milliLiter(s) (100 mL/Hr) IV Continuous <Continuous>  dextrose 50% Injectable 25 Gram(s) IV Push once  dextrose 50% Injectable 12.5 Gram(s) IV Push once  dextrose 50% Injectable 25 Gram(s) IV Push once  enoxaparin Injectable 40 milliGRAM(s) SubCutaneous daily  fenofibrate Tablet 145 milliGRAM(s) Oral daily  gabapentin 600 milliGRAM(s) Oral two times a day  glucagon  Injectable 1 milliGRAM(s) IntraMuscular once  insulin glargine Injectable (LANTUS) 50 Unit(s) SubCutaneous at bedtime  insulin lispro (ADMELOG) corrective regimen sliding scale   SubCutaneous three times a day before meals  insulin lispro (ADMELOG) corrective regimen sliding scale   SubCutaneous at bedtime  insulin lispro Injectable (ADMELOG) 15 Unit(s) SubCutaneous three times a day before meals  metoprolol succinate  milliGRAM(s) Oral daily  pantoprazole    Tablet 40 milliGRAM(s) Oral before breakfast  remdesivir  IVPB   IV Intermittent   remdesivir  IVPB 200 milliGRAM(s) IV Intermittent every 24 hours  simvastatin 20 milliGRAM(s) Oral at bedtime  tamsulosin 0.4 milliGRAM(s) Oral at bedtime    MEDICATIONS  (PRN):  acetaminophen   Tablet .. 650 milliGRAM(s) Oral every 4 hours PRN Temp greater or equal to 38.5C (101.3F)  acetaminophen  Suppository .. 650 milliGRAM(s) Rectal every 4 hours PRN Temp greater or equal to 38.5C (101.3F)  benzonatate 100 milliGRAM(s) Oral three times a day PRN Cough      LABS:                        13.2   7.12  )-----------( 199      ( 22 Jan 2021 10:16 )             43.4     Hgb Trend: 13.2<--  01-22    134<L>  |  97<L>  |  18  ----------------------------<  313<H>  4.3   |  22  |  1.35<H>    Ca    9.2      22 Jan 2021 11:00    TPro  7.5  /  Alb  3.8  /  TBili  0.6  /  DBili  x   /  AST  48<H>  /  ALT  19  /  AlkPhos  56  01-22    Creatinine Trend: 1.35<--        Venous Blood Gas:  01-22 @ 13:19  7.38/37/33/21/60.7  VBG Lactate: 2.5  Venous Blood Gas:  01-22 @ 10:16  7.38/41/<24/22/15.7  VBG Lactate: 2.5      MICROBIOLOGY:     RADIOLOGY & ADDITIONAL TESTS:    ASSESSMENT & PLAN:     NEUROLOGIC:  - Sedation:     SKIN:  - Lines:  - Decubiti:    GI:  - Diet:  - Bowel regiment:    Urinary tract:  - UO:   - Oswald:       METABOLIC:  BMP showing evidence of   Liver functions tests show   Endocrine abnormalities include  01-22    134<L>  |  97<L>  |  18  ----------------------------<  313<H>  4.3   |  22  |  1.35<H>    Ca    9.2      22 Jan 2021 11:00    TPro  7.5  /  Alb  3.8  /  TBili  0.6  /  DBili  x   /  AST  48<H>  /  ALT  19  /  AlkPhos  56  01-22      VOLUME ASSESSMENT:  No peripheral edema  No evidence of disturbance of effective circulating volume    HEMATOLOGIC:  CBC results show  Coag panel shows  DVT prophylaxis with                         13.2   7.12  )-----------( 199      ( 22 Jan 2021 10:16 )             43.4         INFECTIOUS DISEASE:  Pt without strong objective or clinical evidence of infection. Will observe off antibiotics    HEMODYNAMICS:  Patient is on pressors due to ____ shock (cardiogenic due to muscle or valve failure, obstructive due to peff, PE, PTX, hypovolemic, vasopegic)     CARDIOVASCULAR:      RESPIRATORY:  # Hypoxic Respiratory Failure 2/2 COVID-19 PNA  - Intubated 1/22  - Vent Settings:   - MICU Accept Note    CHIEF COMPLAINT:     HPI / INTERVAL HISTORY:  70M with PMH CAD s/p stents x3 (2015), DM2, HTN, HLD, BPH who presents to the hospital with worsening SOB. Per pt - he started feeling unwell on Wednesday and decided to get tested for COVID, which came back positive. He denied any travels/sick contacts. He reports usually walking around his block and suspect that's how he caught it. He lives with his family, but no one else is sick. They have all tested negative so far. He has associated diarrhea that started around the same time. He also reports subjective fevers at home which resolved w/ tylenol. Because SOB has gotten worse along w/ MORTON - he decided to come to the hospital for evaluation.  Otherwise he denied sore throat, CP, palps, abd pain, N/V, dysuria. He does have a productive cough w/ grayish sputum.     ED Course:  151/79, 71,24-33, 98.7F, 95% (10L NRB)  Received Dex 6, APAP, LR 1L    PAST MEDICAL & SURGICAL HISTORY:  CKD stage 3 secondary to diabetes    GERD (gastroesophageal reflux disease)  HLD (hyperlipidemia)  BPH (benign prostatic hyperplasia)  Type 2 diabetes mellitus  CAD (coronary artery disease)  stent x3 2015  HTN (hypertension)  No significant past surgical history    FAMILY HISTORY:  No pertinent family history in first degree relatives    SOCIAL HISTORY:  Lives with children.  No tobacco, drug use. Social ETOH.  Retired. Independent w/ ADLs.  HOME MEDICATIONS:      Allergies  No Known Allergies    Intolerances    REVIEW OF SYSTEMS:  Unable to obtain 2/2 intubation / sedation      OBJECTIVE:  ICU Vital Signs Last 24 Hrs  T(C): 36.7 (22 Jan 2021 13:40), Max: 37.1 (22 Jan 2021 09:09)  T(F): 98 (22 Jan 2021 13:40), Max: 98.7 (22 Jan 2021 09:09)  HR: 89 (22 Jan 2021 15:58) (68 - 89)  BP: 189/74 (22 Jan 2021 15:58) (151/79 - 210/86)  RR: 37 (22 Jan 2021 13:40) (24 - 37)  SpO2: 92% (22 Jan 2021 15:58) (89% - 95%)    CAPILLARY BLOOD GLUCOSE    PHYSICAL EXAM:  General: Ill appearing man  HEENT: EOMI, PERRL  Neck: Supple  Chest/Lungs: Tachypneic, coarse breath sounds b/l, increased work of breathing with accessory muscle use  Heart: Regular rate, +S1/S2, no rubs or murmurs   Abdomen: +BS, soft, non-tender, non-distended  Extremities: No KATIE  Skin: Warm, dry  Neuro: AOx3    LINES:     HOSPITAL MEDICATIONS:  MEDICATIONS  (STANDING):  amLODIPine   Tablet 2.5 milliGRAM(s) Oral daily  clopidogrel Tablet 75 milliGRAM(s) Oral daily  dextrose 40% Gel 15 Gram(s) Oral once  dextrose 5%. 1000 milliLiter(s) (50 mL/Hr) IV Continuous <Continuous>  dextrose 5%. 1000 milliLiter(s) (100 mL/Hr) IV Continuous <Continuous>  dextrose 50% Injectable 25 Gram(s) IV Push once  dextrose 50% Injectable 12.5 Gram(s) IV Push once  dextrose 50% Injectable 25 Gram(s) IV Push once  enoxaparin Injectable 40 milliGRAM(s) SubCutaneous daily  fenofibrate Tablet 145 milliGRAM(s) Oral daily  gabapentin 600 milliGRAM(s) Oral two times a day  glucagon  Injectable 1 milliGRAM(s) IntraMuscular once  insulin glargine Injectable (LANTUS) 50 Unit(s) SubCutaneous at bedtime  insulin lispro (ADMELOG) corrective regimen sliding scale   SubCutaneous three times a day before meals  insulin lispro (ADMELOG) corrective regimen sliding scale   SubCutaneous at bedtime  insulin lispro Injectable (ADMELOG) 15 Unit(s) SubCutaneous three times a day before meals  metoprolol succinate  milliGRAM(s) Oral daily  pantoprazole    Tablet 40 milliGRAM(s) Oral before breakfast  remdesivir  IVPB   IV Intermittent   remdesivir  IVPB 200 milliGRAM(s) IV Intermittent every 24 hours  simvastatin 20 milliGRAM(s) Oral at bedtime  tamsulosin 0.4 milliGRAM(s) Oral at bedtime    MEDICATIONS  (PRN):  acetaminophen   Tablet .. 650 milliGRAM(s) Oral every 4 hours PRN Temp greater or equal to 38.5C (101.3F)  acetaminophen  Suppository .. 650 milliGRAM(s) Rectal every 4 hours PRN Temp greater or equal to 38.5C (101.3F)  benzonatate 100 milliGRAM(s) Oral three times a day PRN Cough      LABS:                        13.2   7.12  )-----------( 199      ( 22 Jan 2021 10:16 )             43.4     Hgb Trend: 13.2<--  01-22    134<L>  |  97<L>  |  18  ----------------------------<  313<H>  4.3   |  22  |  1.35<H>    Ca    9.2      22 Jan 2021 11:00    TPro  7.5  /  Alb  3.8  /  TBili  0.6  /  DBili  x   /  AST  48<H>  /  ALT  19  /  AlkPhos  56  01-22    Creatinine Trend: 1.35<--        Venous Blood Gas:  01-22 @ 13:19  7.38/37/33/21/60.7  VBG Lactate: 2.5  Venous Blood Gas:  01-22 @ 10:16  7.38/41/<24/22/15.7  VBG Lactate: 2.5      MICROBIOLOGY:     RADIOLOGY & ADDITIONAL TESTS:  < from: Xray Chest 1 View- PORTABLE-Urgent (01.22.21 @ 11:03) >    INTERPRETATION:  CLINICAL INDICATION: dyspnea cough fever    EXAM:  Portable frontal views of the chest from 1/22/2021 at 1103. No prior chest x-ray available at this institution for comparison.    IMPRESSION:  Elevated right hemidiaphragm.    Extensive patchy indistinct groundglass opacities in bilateral peripheral and lower lungs.    No pleural effusions or pneumothorax.    Cardiac and mediastinal silhouettes grossly within normal limits for the projection.    Trachea midline.    Unremarkable osseous structures.    < end of copied text >        ASSESSMENT & PLAN:   70M with PMH CAD s/p stents x3 (2015), DM2, HTN, HLD, BPH admitted 1/22/2021 for acute hypoxic respiratory failure 2' COVID-19 PNA transferred to MICU s/p intubation 1/22/2021.        NEUROLOGIC:  - Sedation:     CARDIOVASCULAR:  - No peripheral edema  - No evidence of disturbance of effective circulating volume  - No pressor requirement at this time    RESPIRATORY:  # Hypoxic Respiratory Failure 2/2 COVID-19 PNA  - Intubated 1/22  - Vent Settings:     INFECTIOUS DISEASE:  - (+) COVID-19 PNA  - Dexamethasone 6mg (1/22 - )   - Remdesivir (1/22 - )     GI:  - Diet:  - Bowel regiment:    Urinary tract:  - UO:   - Oswald:     HEMATOLOGIC:  - Coag panel shows  - DVT prophylaxis with     SKIN:  - Lines:  - Decubiti: MICU Accept Note    CHIEF COMPLAINT:     HPI / INTERVAL HISTORY:  70M with PMH CAD s/p stents x3 (2015), DM2, HTN, HLD, BPH who presents to the hospital with worsening SOB. Per pt - he started feeling unwell on Wednesday and decided to get tested for COVID, which came back positive. He denied any travels/sick contacts. He reports usually walking around his block and suspect that's how he caught it. He lives with his family, but no one else is sick. They have all tested negative so far. He has associated diarrhea that started around the same time. He also reports subjective fevers at home which resolved w/ tylenol. Because SOB has gotten worse along w/ MORTON - he decided to come to the hospital for evaluation.  Otherwise he denied sore throat, CP, palps, abd pain, N/V, dysuria. He does have a productive cough w/ grayish sputum.     ED Course:  151/79, 71,24-33, 98.7F, 95% (10L NRB)  Received Dex 6, APAP, LR 1L  Intubated for increased work of breathing associated with hypoxia     PAST MEDICAL & SURGICAL HISTORY:  CKD stage 3 secondary to diabetes    GERD (gastroesophageal reflux disease)  HLD (hyperlipidemia)  BPH (benign prostatic hyperplasia)  Type 2 diabetes mellitus  CAD (coronary artery disease)  stent x3 2015  HTN (hypertension)  No significant past surgical history    FAMILY HISTORY:  No pertinent family history in first degree relatives    SOCIAL HISTORY:  Lives with children.  No tobacco, drug use. Social ETOH.  Retired. Independent w/ ADLs.  HOME MEDICATIONS:      Allergies  No Known Allergies    Intolerances    REVIEW OF SYSTEMS:  Unable to obtain 2/2 intubation / sedation      OBJECTIVE:  ICU Vital Signs Last 24 Hrs  T(C): 36.7 (22 Jan 2021 13:40), Max: 37.1 (22 Jan 2021 09:09)  T(F): 98 (22 Jan 2021 13:40), Max: 98.7 (22 Jan 2021 09:09)  HR: 89 (22 Jan 2021 15:58) (68 - 89)  BP: 189/74 (22 Jan 2021 15:58) (151/79 - 210/86)  RR: 37 (22 Jan 2021 13:40) (24 - 37)  SpO2: 92% (22 Jan 2021 15:58) (89% - 95%)    CAPILLARY BLOOD GLUCOSE    PHYSICAL EXAM:  General: Ill appearing man  HEENT: EOMI, PERRL  Neck: Supple  Chest/Lungs: Tachypneic, coarse breath sounds b/l, increased work of breathing with accessory muscle use  Heart: Regular rate, +S1/S2, no rubs or murmurs   Abdomen: +BS, soft, non-tender, non-distended  Extremities: No KATIE  Skin: Warm, dry  Neuro: AOx3    LINES:     HOSPITAL MEDICATIONS:  MEDICATIONS  (STANDING):  amLODIPine   Tablet 2.5 milliGRAM(s) Oral daily  clopidogrel Tablet 75 milliGRAM(s) Oral daily  dextrose 40% Gel 15 Gram(s) Oral once  dextrose 5%. 1000 milliLiter(s) (50 mL/Hr) IV Continuous <Continuous>  dextrose 5%. 1000 milliLiter(s) (100 mL/Hr) IV Continuous <Continuous>  dextrose 50% Injectable 25 Gram(s) IV Push once  dextrose 50% Injectable 12.5 Gram(s) IV Push once  dextrose 50% Injectable 25 Gram(s) IV Push once  enoxaparin Injectable 40 milliGRAM(s) SubCutaneous daily  fenofibrate Tablet 145 milliGRAM(s) Oral daily  gabapentin 600 milliGRAM(s) Oral two times a day  glucagon  Injectable 1 milliGRAM(s) IntraMuscular once  insulin glargine Injectable (LANTUS) 50 Unit(s) SubCutaneous at bedtime  insulin lispro (ADMELOG) corrective regimen sliding scale   SubCutaneous three times a day before meals  insulin lispro (ADMELOG) corrective regimen sliding scale   SubCutaneous at bedtime  insulin lispro Injectable (ADMELOG) 15 Unit(s) SubCutaneous three times a day before meals  metoprolol succinate  milliGRAM(s) Oral daily  pantoprazole    Tablet 40 milliGRAM(s) Oral before breakfast  remdesivir  IVPB   IV Intermittent   remdesivir  IVPB 200 milliGRAM(s) IV Intermittent every 24 hours  simvastatin 20 milliGRAM(s) Oral at bedtime  tamsulosin 0.4 milliGRAM(s) Oral at bedtime    MEDICATIONS  (PRN):  acetaminophen   Tablet .. 650 milliGRAM(s) Oral every 4 hours PRN Temp greater or equal to 38.5C (101.3F)  acetaminophen  Suppository .. 650 milliGRAM(s) Rectal every 4 hours PRN Temp greater or equal to 38.5C (101.3F)  benzonatate 100 milliGRAM(s) Oral three times a day PRN Cough      LABS:                        13.2   7.12  )-----------( 199      ( 22 Jan 2021 10:16 )             43.4     Hgb Trend: 13.2<--  01-22    134<L>  |  97<L>  |  18  ----------------------------<  313<H>  4.3   |  22  |  1.35<H>    Ca    9.2      22 Jan 2021 11:00    TPro  7.5  /  Alb  3.8  /  TBili  0.6  /  DBili  x   /  AST  48<H>  /  ALT  19  /  AlkPhos  56  01-22    Creatinine Trend: 1.35<--        Venous Blood Gas:  01-22 @ 13:19  7.38/37/33/21/60.7  VBG Lactate: 2.5  Venous Blood Gas:  01-22 @ 10:16  7.38/41/<24/22/15.7  VBG Lactate: 2.5      MICROBIOLOGY:     RADIOLOGY & ADDITIONAL TESTS:  < from: Xray Chest 1 View- PORTABLE-Urgent (01.22.21 @ 11:03) >    INTERPRETATION:  CLINICAL INDICATION: dyspnea cough fever    EXAM:  Portable frontal views of the chest from 1/22/2021 at 1103. No prior chest x-ray available at this institution for comparison.    IMPRESSION:  Elevated right hemidiaphragm.    Extensive patchy indistinct groundglass opacities in bilateral peripheral and lower lungs.    No pleural effusions or pneumothorax.    Cardiac and mediastinal silhouettes grossly within normal limits for the projection.    Trachea midline.    Unremarkable osseous structures.    < end of copied text >        ASSESSMENT & PLAN:   70M with PMH CAD s/p stents x3 (2015), DM2, HTN, HLD, BPH admitted 1/22/2021 for acute hypoxic respiratory failure 2' COVID-19 PNA transferred to MICU s/p intubation 1/22/2021.        NEUROLOGIC:  - Sedation:     CARDIOVASCULAR:  - No peripheral edema  - No evidence of disturbance of effective circulating volume  - No pressor requirement at this time    RESPIRATORY:  # Hypoxic Respiratory Failure 2/2 COVID-19 PNA  - Intubated 1/22  - Vent Settings:     INFECTIOUS DISEASE:  - (+) COVID-19 PNA  - Dexamethasone 6mg (1/22 - )   - Remdesivir (1/22 - )     GI:  - Diet:  - Bowel regiment:    Urinary tract:  - UO:   - Oswald:     HEMATOLOGIC:  - Coag panel shows  - DVT prophylaxis with     SKIN:  - Lines:  - Decubiti: MICU Accept Note    CHIEF COMPLAINT:     HPI / INTERVAL HISTORY: The patient is a 70-year-old man with a past medical history of hypertension, hyperlipidemia, and type II diabetes mellitus who presented to the emergency department today with shortness of breath for the past three days. Per chart review, the patient has noted progressive shortness of breath with exertion       70M with PMH CAD s/p stents x3 (2015), DM2, HTN, HLD, BPH who presents to the hospital with worsening SOB. Per pt - he started feeling unwell on Wednesday and decided to get tested for COVID, which came back positive. He denied any travels/sick contacts. He reports usually walking around his block and suspect that's how he caught it. He lives with his family, but no one else is sick. They have all tested negative so far. He has associated diarrhea that started around the same time. He also reports subjective fevers at home which resolved w/ tylenol. Because SOB has gotten worse along w/ MORTON - he decided to come to the hospital for evaluation.  Otherwise he denied sore throat, CP, palps, abd pain, N/V, dysuria. He does have a productive cough w/ grayish sputum.     ED Course:  151/79, 71,24-33, 98.7F, 95% (10L NRB)  Received Dex 6, APAP, LR 1L  Intubated for increased work of breathing associated with hypoxia     PAST MEDICAL & SURGICAL HISTORY:  CKD stage 3 secondary to diabetes    GERD (gastroesophageal reflux disease)  HLD (hyperlipidemia)  BPH (benign prostatic hyperplasia)  Type 2 diabetes mellitus  CAD (coronary artery disease)  stent x3 2015  HTN (hypertension)  No significant past surgical history    FAMILY HISTORY:  No pertinent family history in first degree relatives    SOCIAL HISTORY:  Lives with children.  No tobacco, drug use. Social ETOH.  Retired. Independent w/ ADLs.  HOME MEDICATIONS:      Allergies  No Known Allergies    Intolerances    REVIEW OF SYSTEMS:  Unable to obtain 2/2 intubation / sedation      OBJECTIVE:  ICU Vital Signs Last 24 Hrs  T(C): 36.7 (22 Jan 2021 13:40), Max: 37.1 (22 Jan 2021 09:09)  T(F): 98 (22 Jan 2021 13:40), Max: 98.7 (22 Jan 2021 09:09)  HR: 89 (22 Jan 2021 15:58) (68 - 89)  BP: 189/74 (22 Jan 2021 15:58) (151/79 - 210/86)  RR: 37 (22 Jan 2021 13:40) (24 - 37)  SpO2: 92% (22 Jan 2021 15:58) (89% - 95%)    CAPILLARY BLOOD GLUCOSE    PHYSICAL EXAM:  General: Ill appearing man  HEENT: EOMI, PERRL  Neck: Supple  Chest/Lungs: Tachypneic, coarse breath sounds b/l, increased work of breathing with accessory muscle use  Heart: Regular rate, +S1/S2, no rubs or murmurs   Abdomen: +BS, soft, non-tender, non-distended  Extremities: No KATIE  Skin: Warm, dry  Neuro: AOx3    LINES:     HOSPITAL MEDICATIONS:  MEDICATIONS  (STANDING):  amLODIPine   Tablet 2.5 milliGRAM(s) Oral daily  clopidogrel Tablet 75 milliGRAM(s) Oral daily  dextrose 40% Gel 15 Gram(s) Oral once  dextrose 5%. 1000 milliLiter(s) (50 mL/Hr) IV Continuous <Continuous>  dextrose 5%. 1000 milliLiter(s) (100 mL/Hr) IV Continuous <Continuous>  dextrose 50% Injectable 25 Gram(s) IV Push once  dextrose 50% Injectable 12.5 Gram(s) IV Push once  dextrose 50% Injectable 25 Gram(s) IV Push once  enoxaparin Injectable 40 milliGRAM(s) SubCutaneous daily  fenofibrate Tablet 145 milliGRAM(s) Oral daily  gabapentin 600 milliGRAM(s) Oral two times a day  glucagon  Injectable 1 milliGRAM(s) IntraMuscular once  insulin glargine Injectable (LANTUS) 50 Unit(s) SubCutaneous at bedtime  insulin lispro (ADMELOG) corrective regimen sliding scale   SubCutaneous three times a day before meals  insulin lispro (ADMELOG) corrective regimen sliding scale   SubCutaneous at bedtime  insulin lispro Injectable (ADMELOG) 15 Unit(s) SubCutaneous three times a day before meals  metoprolol succinate  milliGRAM(s) Oral daily  pantoprazole    Tablet 40 milliGRAM(s) Oral before breakfast  remdesivir  IVPB   IV Intermittent   remdesivir  IVPB 200 milliGRAM(s) IV Intermittent every 24 hours  simvastatin 20 milliGRAM(s) Oral at bedtime  tamsulosin 0.4 milliGRAM(s) Oral at bedtime    MEDICATIONS  (PRN):  acetaminophen   Tablet .. 650 milliGRAM(s) Oral every 4 hours PRN Temp greater or equal to 38.5C (101.3F)  acetaminophen  Suppository .. 650 milliGRAM(s) Rectal every 4 hours PRN Temp greater or equal to 38.5C (101.3F)  benzonatate 100 milliGRAM(s) Oral three times a day PRN Cough      LABS:                        13.2   7.12  )-----------( 199      ( 22 Jan 2021 10:16 )             43.4     Hgb Trend: 13.2<--  01-22    134<L>  |  97<L>  |  18  ----------------------------<  313<H>  4.3   |  22  |  1.35<H>    Ca    9.2      22 Jan 2021 11:00    TPro  7.5  /  Alb  3.8  /  TBili  0.6  /  DBili  x   /  AST  48<H>  /  ALT  19  /  AlkPhos  56  01-22    Creatinine Trend: 1.35<--        Venous Blood Gas:  01-22 @ 13:19  7.38/37/33/21/60.7  VBG Lactate: 2.5  Venous Blood Gas:  01-22 @ 10:16  7.38/41/<24/22/15.7  VBG Lactate: 2.5      MICROBIOLOGY:     RADIOLOGY & ADDITIONAL TESTS:  < from: Xray Chest 1 View- PORTABLE-Urgent (01.22.21 @ 11:03) >    INTERPRETATION:  CLINICAL INDICATION: dyspnea cough fever    EXAM:  Portable frontal views of the chest from 1/22/2021 at 1103. No prior chest x-ray available at this institution for comparison.    IMPRESSION:  Elevated right hemidiaphragm.    Extensive patchy indistinct groundglass opacities in bilateral peripheral and lower lungs.    No pleural effusions or pneumothorax.    Cardiac and mediastinal silhouettes grossly within normal limits for the projection.    Trachea midline.    Unremarkable osseous structures.    < end of copied text >        ASSESSMENT & PLAN:   70M with PMH CAD s/p stents x3 (2015), DM2, HTN, HLD, BPH admitted 1/22/2021 for acute hypoxic respiratory failure 2' COVID-19 PNA transferred to MICU s/p intubation 1/22/2021.        NEUROLOGIC:  - Sedation:     CARDIOVASCULAR:  - No peripheral edema  - No evidence of disturbance of effective circulating volume  - No pressor requirement at this time    RESPIRATORY:  # Hypoxic Respiratory Failure 2/2 COVID-19 PNA  - Intubated 1/22  - Vent Settings:     INFECTIOUS DISEASE:  - (+) COVID-19 PNA  - Dexamethasone 6mg (1/22 - )   - Remdesivir (1/22 - )     GI:  - Diet:  - Bowel regiment:    Urinary tract:  - UO:   - Oswlad:     HEMATOLOGIC:  - Coag panel shows  - DVT prophylaxis with     SKIN:  - Lines:  - Decubiti: MICU Accept Note    CHIEF COMPLAINT:     HPI / INTERVAL HISTORY: The patient is a 70-year-old man with a past medical history of hypertension, hyperlipidemia, and type II diabetes mellitus who presented to the emergency department today with shortness of breath for the past three days. Per chart review, the patient has noted progressive shortness of breath with exertion. However, despite his history of coronary artery disease, he has no known history of cardiomyopathy. The patient did not report any history of orthopnea, paroxysmal nocturnal dyspnea, or swelling (unilaterally or bilaterally). He has experienced subjective fevers, which have resolved following administration of oral acetaminophen, but he has not had any sick contacts. He is a never smoker, and he has no known history of asthma. However, the patient has had a non-productive cough. The patient has no known history of anemia, and his diabetes is well controlled on his home anti-diabetic regimen. He has not experienced any trauma to his chest wall. Associated with his symptoms, the patient does note intermittent, watery diarrhea, but he has not experienced any nausea or vomiting.     ED Course: In the emergency department today, the patient remained tachypneic despite administration of 10 L/minute of oxygen via a non-rebreather mask. He was administered one dose of oral acetaminophen, one dose of dexamethasone, and one liter of intravenous crystalloid. His sars-cov2 pcr was noted to be positive. His care was transitioned to the internal medicine service, and because he remained tachypneic, he was placed on bilevel positive airway pressure but remained very tachypneic using accessory muscles of respiration and progressively becoming more tired. For that reason, the medical intensive care unit team was consulted, and per a goals of care discussion with the patient, he underwent endotracheal intubation and was transferred to the medical intensive care unit for further management.     PAST MEDICAL & SURGICAL HISTORY:  CKD stage 3 secondary to diabetes    GERD (gastroesophageal reflux disease)  HLD (hyperlipidemia)  BPH (benign prostatic hyperplasia)  Type 2 diabetes mellitus  CAD (coronary artery disease)  stent x3 2015  HTN (hypertension)  No significant past surgical history    FAMILY HISTORY:  No pertinent family history in first degree relatives    SOCIAL HISTORY:  Lives with children.  No tobacco, drug use. Social ETOH.  Retired. Independent w/ ADLs.    HOME MEDICATIONS: amlodipine 2.5 mg oral daily, insulin glargine 68 units nightly, insulin lispro 15 units prior to meals, fenofibrate 145 units oral daily, tamsulosin 0.4 mg oral daily, furosemide 20 mg oral daily, metoprolol succinate 100 mg oral daily, omeprazole 40 mg oral daily, plavix 75 mg oral daily, simvastatin 40 mg oral daily     Allergies  No Known Allergies    Intolerances    REVIEW OF SYSTEMS:  Unable to obtain 2/2 intubation / sedation      OBJECTIVE:  ICU Vital Signs Last 24 Hrs  T(C): 36.7 (22 Jan 2021 13:40), Max: 37.1 (22 Jan 2021 09:09)  T(F): 98 (22 Jan 2021 13:40), Max: 98.7 (22 Jan 2021 09:09)  HR: 89 (22 Jan 2021 15:58) (68 - 89)  BP: 189/74 (22 Jan 2021 15:58) (151/79 - 210/86)  RR: 37 (22 Jan 2021 13:40) (24 - 37)  SpO2: 92% (22 Jan 2021 15:58) (89% - 95%)    CAPILLARY BLOOD GLUCOSE    PHYSICAL EXAM: GENERAL: Sedated and intubated connected to mechanical ventilator; not responsive to pain or to voice   HEAD:  Atraumatic, Normocephalic  EYES: Pupils 3 mm, equal, and reactive bilaterally; conjunctiva and sclera clear  ENMT: No tonsillar erythema, exudates, or enlargement; Moist mucous membranes, Good dentition, No lesions  NECK: Supple, No JVD, Normal thyroid  NERVOUS SYSTEM: RASS -5, not responsive to pain, does not open eyes; pupils round and equal   CHEST/LUNG: Equal chest rise bilaterally; tympanic to percussion; no crepitus   HEART: Point of maximal impulse not displaced; peripheral pulses 2+ bilaterally   ABDOMEN: Soft, Nontender, Nondistended; no scarring, no bruising; Bowel sounds present  EXTREMITIES:  2+ Peripheral Pulses, No clubbing, cyanosis, or edema  LYMPH: No lymphadenopathy noted  SKIN: No rashes, no lesions    LINES: peripheral     HOSPITAL MEDICATIONS:  MEDICATIONS  (STANDING):  amLODIPine   Tablet 2.5 milliGRAM(s) Oral daily  clopidogrel Tablet 75 milliGRAM(s) Oral daily  dextrose 40% Gel 15 Gram(s) Oral once  dextrose 5%. 1000 milliLiter(s) (50 mL/Hr) IV Continuous <Continuous>  dextrose 5%. 1000 milliLiter(s) (100 mL/Hr) IV Continuous <Continuous>  dextrose 50% Injectable 25 Gram(s) IV Push once  dextrose 50% Injectable 12.5 Gram(s) IV Push once  dextrose 50% Injectable 25 Gram(s) IV Push once  enoxaparin Injectable 40 milliGRAM(s) SubCutaneous daily  fenofibrate Tablet 145 milliGRAM(s) Oral daily  gabapentin 600 milliGRAM(s) Oral two times a day  glucagon  Injectable 1 milliGRAM(s) IntraMuscular once  insulin glargine Injectable (LANTUS) 50 Unit(s) SubCutaneous at bedtime  insulin lispro (ADMELOG) corrective regimen sliding scale   SubCutaneous three times a day before meals  insulin lispro (ADMELOG) corrective regimen sliding scale   SubCutaneous at bedtime  insulin lispro Injectable (ADMELOG) 15 Unit(s) SubCutaneous three times a day before meals  metoprolol succinate  milliGRAM(s) Oral daily  pantoprazole    Tablet 40 milliGRAM(s) Oral before breakfast  remdesivir  IVPB   IV Intermittent   remdesivir  IVPB 200 milliGRAM(s) IV Intermittent every 24 hours  simvastatin 20 milliGRAM(s) Oral at bedtime  tamsulosin 0.4 milliGRAM(s) Oral at bedtime    MEDICATIONS  (PRN):  acetaminophen   Tablet .. 650 milliGRAM(s) Oral every 4 hours PRN Temp greater or equal to 38.5C (101.3F)  acetaminophen  Suppository .. 650 milliGRAM(s) Rectal every 4 hours PRN Temp greater or equal to 38.5C (101.3F)  benzonatate 100 milliGRAM(s) Oral three times a day PRN Cough      LABS:                        13.2   7.12  )-----------( 199      ( 22 Jan 2021 10:16 )             43.4     Hgb Trend: 13.2<--  01-22    134<L>  |  97<L>  |  18  ----------------------------<  313<H>  4.3   |  22  |  1.35<H>    Ca    9.2      22 Jan 2021 11:00    TPro  7.5  /  Alb  3.8  /  TBili  0.6  /  DBili  x   /  AST  48<H>  /  ALT  19  /  AlkPhos  56  01-22    Creatinine Trend: 1.35<--        Venous Blood Gas:  01-22 @ 13:19  7.38/37/33/21/60.7  VBG Lactate: 2.5  Venous Blood Gas:  01-22 @ 10:16  7.38/41/<24/22/15.7  VBG Lactate: 2.5      MICROBIOLOGY:     RADIOLOGY & ADDITIONAL TESTS:  < from: Xray Chest 1 View- PORTABLE-Urgent (01.22.21 @ 11:03) >    INTERPRETATION:  CLINICAL INDICATION: dyspnea cough fever    EXAM:  Portable frontal views of the chest from 1/22/2021 at 1103. No prior chest x-ray available at this institution for comparison.    IMPRESSION:  Elevated right hemidiaphragm.    Extensive patchy indistinct groundglass opacities in bilateral peripheral and lower lungs.    No pleural effusions or pneumothorax.    Cardiac and mediastinal silhouettes grossly within normal limits for the projection.    Trachea midline.    Unremarkable osseous structures.    < end of copied text >        ASSESSMENT & PLAN: The patient is a 70-year-old man with a past medical history of hypertension, hyperlipidemia, and coronary artery disease who presented to the emergency department with acute respiratory failure with hypoxia secondary to covid-19-associated pneumonia whose condition has progressed to severe acute respiratory distress syndrome requiring endotracheal intubation and management on a mechanical ventilator.     NEUROLOGIC: -The patient is currently sedated with a propofol continuous infusion; rass -5 at this time; will target rass -5 to allow for complete assist control and lung-protective strategy  -If requires additional sedation will administer fentanyl and then midazolam; if p:f ratio less than 100, will administer cisatracurium for paralysis and then will place in prone position     CARDIOVASCULAR: -Hemodynamically stable at this time not requiring intravenous blood pressure support; will place midline or central line to allow for administration of intravenous blood pressure support if necessary     RESPIRATORY: -Acute hypoxic respiratory failure secondary to covid-19, which has progressed to severe acute respiratory distress syndrome; will order arterial blood gas one hour following transition of care to medical intensive care unit and will calculate p:f ratio at that time; estimated pulmonary compliance 22.5 mL/mm Hg at this time   -Current ventilator settings--AC/CMV, Tv 450, peep 8, rr 22, fio2 100    INFECTIOUS DISEASE: -COVID-19 pneumonia; administering dexamethasone 6 mg daily for ten-day course or until end of hospitalization and remdesivir 200 mg today followed by 100 mg daily for the next four days  -Serum procalcitonin marginally elevated but no focal consolidation on chest xray and no other focal signs of acute bacterial infection; will manage off of antibiotics at this time; will assess d dimer, ldh, crp, ferritin every 48 hours and consider antibiotic therapy if brisk elevation     GI: -Will place orogastric tube today and administer     Urinary tract:  - UO:   - Oswald:     HEMATOLOGIC:  - Coag panel shows  - DVT prophylaxis with     SKIN:  - Lines:  - Decubiti: MICU Accept Note    CHIEF COMPLAINT:     HPI / INTERVAL HISTORY: The patient is a 70-year-old man with a past medical history of hypertension, hyperlipidemia, and type II diabetes mellitus who presented to the emergency department today with shortness of breath for the past three days. Per chart review, the patient has noted progressive shortness of breath with exertion. However, despite his history of coronary artery disease, he has no known history of cardiomyopathy. The patient did not report any history of orthopnea, paroxysmal nocturnal dyspnea, or swelling (unilaterally or bilaterally). He has experienced subjective fevers, which have resolved following administration of oral acetaminophen, but he has not had any sick contacts. He is a never smoker, and he has no known history of asthma. However, the patient has had a non-productive cough. The patient has no known history of anemia, and his diabetes is well controlled on his home anti-diabetic regimen. He has not experienced any trauma to his chest wall. Associated with his symptoms, the patient does note intermittent, watery diarrhea, but he has not experienced any nausea or vomiting.     ED Course: In the emergency department today, the patient remained tachypneic despite administration of 10 L/minute of oxygen via a non-rebreather mask. He was administered one dose of oral acetaminophen, one dose of dexamethasone, and one liter of intravenous crystalloid. His sars-cov2 pcr was noted to be positive. His care was transitioned to the internal medicine service, and because he remained tachypneic, he was placed on bilevel positive airway pressure but remained very tachypneic using accessory muscles of respiration and progressively becoming more tired. For that reason, the medical intensive care unit team was consulted, and per a goals of care discussion with the patient, he underwent endotracheal intubation and was transferred to the medical intensive care unit for further management.     PAST MEDICAL & SURGICAL HISTORY:  CKD stage 3 secondary to diabetes    GERD (gastroesophageal reflux disease)  HLD (hyperlipidemia)  BPH (benign prostatic hyperplasia)  Type 2 diabetes mellitus  CAD (coronary artery disease)  stent x3 2015  HTN (hypertension)  No significant past surgical history    FAMILY HISTORY:  No pertinent family history in first degree relatives    SOCIAL HISTORY:  Lives with children.  No tobacco, drug use. Social ETOH.  Retired. Independent w/ ADLs.    HOME MEDICATIONS: amlodipine 2.5 mg oral daily, insulin glargine 68 units nightly, insulin lispro 15 units prior to meals, fenofibrate 145 units oral daily, tamsulosin 0.4 mg oral daily, furosemide 20 mg oral daily, metoprolol succinate 100 mg oral daily, omeprazole 40 mg oral daily, plavix 75 mg oral daily, simvastatin 40 mg oral daily     Allergies  No Known Allergies    Intolerances    REVIEW OF SYSTEMS:  Unable to obtain 2/2 intubation / sedation      OBJECTIVE:  ICU Vital Signs Last 24 Hrs  T(C): 36.7 (22 Jan 2021 13:40), Max: 37.1 (22 Jan 2021 09:09)  T(F): 98 (22 Jan 2021 13:40), Max: 98.7 (22 Jan 2021 09:09)  HR: 89 (22 Jan 2021 15:58) (68 - 89)  BP: 189/74 (22 Jan 2021 15:58) (151/79 - 210/86)  RR: 37 (22 Jan 2021 13:40) (24 - 37)  SpO2: 92% (22 Jan 2021 15:58) (89% - 95%)    CAPILLARY BLOOD GLUCOSE    PHYSICAL EXAM: GENERAL: Sedated and intubated connected to mechanical ventilator; not responsive to pain or to voice   HEAD:  Atraumatic, Normocephalic  EYES: Pupils 3 mm, equal, and reactive bilaterally; conjunctiva and sclera clear  ENMT: No tonsillar erythema, exudates, or enlargement; Moist mucous membranes, Good dentition, No lesions  NECK: Supple, No JVD, Normal thyroid  NERVOUS SYSTEM: RASS -5, not responsive to pain, does not open eyes; pupils round and equal   CHEST/LUNG: Equal chest rise bilaterally; tympanic to percussion; no crepitus   HEART: Point of maximal impulse not displaced; peripheral pulses 2+ bilaterally   ABDOMEN: Soft, Nontender, Nondistended; no scarring, no bruising; Bowel sounds present  EXTREMITIES:  2+ Peripheral Pulses, No clubbing, cyanosis, or edema  LYMPH: No lymphadenopathy noted  SKIN: No rashes, no lesions    LINES: peripheral     HOSPITAL MEDICATIONS:  MEDICATIONS  (STANDING):  amLODIPine   Tablet 2.5 milliGRAM(s) Oral daily  clopidogrel Tablet 75 milliGRAM(s) Oral daily  dextrose 40% Gel 15 Gram(s) Oral once  dextrose 5%. 1000 milliLiter(s) (50 mL/Hr) IV Continuous <Continuous>  dextrose 5%. 1000 milliLiter(s) (100 mL/Hr) IV Continuous <Continuous>  dextrose 50% Injectable 25 Gram(s) IV Push once  dextrose 50% Injectable 12.5 Gram(s) IV Push once  dextrose 50% Injectable 25 Gram(s) IV Push once  enoxaparin Injectable 40 milliGRAM(s) SubCutaneous daily  fenofibrate Tablet 145 milliGRAM(s) Oral daily  gabapentin 600 milliGRAM(s) Oral two times a day  glucagon  Injectable 1 milliGRAM(s) IntraMuscular once  insulin glargine Injectable (LANTUS) 50 Unit(s) SubCutaneous at bedtime  insulin lispro (ADMELOG) corrective regimen sliding scale   SubCutaneous three times a day before meals  insulin lispro (ADMELOG) corrective regimen sliding scale   SubCutaneous at bedtime  insulin lispro Injectable (ADMELOG) 15 Unit(s) SubCutaneous three times a day before meals  metoprolol succinate  milliGRAM(s) Oral daily  pantoprazole    Tablet 40 milliGRAM(s) Oral before breakfast  remdesivir  IVPB   IV Intermittent   remdesivir  IVPB 200 milliGRAM(s) IV Intermittent every 24 hours  simvastatin 20 milliGRAM(s) Oral at bedtime  tamsulosin 0.4 milliGRAM(s) Oral at bedtime    MEDICATIONS  (PRN):  acetaminophen   Tablet .. 650 milliGRAM(s) Oral every 4 hours PRN Temp greater or equal to 38.5C (101.3F)  acetaminophen  Suppository .. 650 milliGRAM(s) Rectal every 4 hours PRN Temp greater or equal to 38.5C (101.3F)  benzonatate 100 milliGRAM(s) Oral three times a day PRN Cough      LABS:                        13.2   7.12  )-----------( 199      ( 22 Jan 2021 10:16 )             43.4     Hgb Trend: 13.2<--  01-22    134<L>  |  97<L>  |  18  ----------------------------<  313<H>  4.3   |  22  |  1.35<H>    Ca    9.2      22 Jan 2021 11:00    TPro  7.5  /  Alb  3.8  /  TBili  0.6  /  DBili  x   /  AST  48<H>  /  ALT  19  /  AlkPhos  56  01-22    Creatinine Trend: 1.35<--        Venous Blood Gas:  01-22 @ 13:19  7.38/37/33/21/60.7  VBG Lactate: 2.5  Venous Blood Gas:  01-22 @ 10:16  7.38/41/<24/22/15.7  VBG Lactate: 2.5      MICROBIOLOGY:     RADIOLOGY & ADDITIONAL TESTS:  < from: Xray Chest 1 View- PORTABLE-Urgent (01.22.21 @ 11:03) >    INTERPRETATION:  CLINICAL INDICATION: dyspnea cough fever    EXAM:  Portable frontal views of the chest from 1/22/2021 at 1103. No prior chest x-ray available at this institution for comparison.    IMPRESSION:  Elevated right hemidiaphragm.    Extensive patchy indistinct groundglass opacities in bilateral peripheral and lower lungs.    No pleural effusions or pneumothorax.    Cardiac and mediastinal silhouettes grossly within normal limits for the projection.    Trachea midline.    Unremarkable osseous structures.    < end of copied text >        ASSESSMENT & PLAN: The patient is a 70-year-old man with a past medical history of hypertension, hyperlipidemia, and coronary artery disease who presented to the emergency department with acute respiratory failure with hypoxia secondary to covid-19-associated pneumonia whose condition has progressed to severe acute respiratory distress syndrome requiring endotracheal intubation and management on a mechanical ventilator.     NEUROLOGIC: -The patient is currently sedated with a propofol continuous infusion; rass -5 at this time; will target rass -5 to allow for complete assist control and lung-protective strategy  -If requires additional sedation will administer fentanyl and then midazolam; if p:f ratio less than 100, will administer cisatracurium for paralysis and then will place in prone position     CARDIOVASCULAR: -Hemodynamically stable at this time not requiring intravenous blood pressure support; will place midline or central line to allow for administration of intravenous blood pressure support if necessary     RESPIRATORY: -Acute hypoxic respiratory failure secondary to covid-19, which has progressed to severe acute respiratory distress syndrome; will order arterial blood gas one hour following transition of care to medical intensive care unit and will calculate p:f ratio at that time; estimated pulmonary compliance 22.5 mL/mm Hg at this time   -Current ventilator settings--AC/CMV, Tv 450, peep 8, rr 22, fio2 100    INFECTIOUS DISEASE: -COVID-19 pneumonia; administering dexamethasone 6 mg daily for ten-day course or until end of hospitalization and remdesivir 200 mg today followed by 100 mg daily for the next four days  -Serum procalcitonin marginally elevated but no focal consolidation on chest xray and no other focal signs of acute bacterial infection; will manage off of antibiotics at this time; will assess d dimer, ldh, crp, ferritin every 48 hours and consider antibiotic therapy if brisk elevation     GI: -Will place orogastric tube today and administer glucerna tube feeds   -Will monitor bowel movements     Urinary tract: -Oswald catheter placed today; will monitor strict intake and output and assess metabolic panel daily; no acid-base or electrolyte concerns at the time of admission     HEMATOLOGIC: -DVT prophylaxis with lovenox 40 subcutaneous daily; no anemia, leukocytosis, leukopenia, or thrombocytopenia     SKIN: -No known decubiti at the time of admission   -Peripheral lines only at this time; will place central venous access and arterial line

## 2021-01-22 NOTE — H&P ADULT - PROBLEM SELECTOR PLAN 9
DVT ppx - Need wt/BMI. Will f/u.    Communication  -Spoke w/ son who has been updated on POC. All questions/concerns addressed. Discussed quarantining, which he is aware of.   -Spoke w/ PCP Dr. Henry. Case reviewed. Hx reviewed.

## 2021-01-22 NOTE — PROCEDURE NOTE - NSPOSTCAREGUIDE_GEN_A_CORE
Care for catheter as per unit/ICU protocols
Keep the cast/splint/dressing clean and dry
Care for catheter as per unit/ICU protocols

## 2021-01-22 NOTE — ED ADULT NURSE REASSESSMENT NOTE - NS ED NURSE REASSESS COMMENT FT1
patient breathing tachypneic with Bipap assistance RR 40. MICU Resident at bedside evaluating patient. patient speaking full sentences denies discomfort. 1 person assist with urinal tolerated well.

## 2021-01-22 NOTE — H&P ADULT - ASSESSMENT
70M with PMH CAD s/p stents x3 (2015), DM2, HTN, HLD, BPH admitted for acute hypoxic respiratory failure 2' COVID-19 PNA.

## 2021-01-22 NOTE — H&P ADULT - NSHPLABSRESULTS_GEN_ALL_CORE
13.2   7.12  )-----------( 199      ( 22 Jan 2021 10:16 )             43.4     01-22    134<L>  |  97<L>  |  18  ----------------------------<  313<H>  4.3   |  22  |  1.35<H>    Ca    9.2      22 Jan 2021 11:00    TPro  7.5  /  Alb  3.8  /  TBili  0.6  /  DBili  x   /  AST  48<H>  /  ALT  19  /  AlkPhos  56  01-22      Serum Pro-Brain Natriuretic Peptide: 550 pg/mL (01-22 @ 11:00)    CXR reviewed - b/l infiltrates noted.  Admission EKG pending.

## 2021-01-22 NOTE — ED PROVIDER NOTE - PHYSICAL EXAMINATION
PHYSICAL EXAM:  GENERAL: tachypneic, gasping for air but able to complete full sentences  CHEST/LUNG: Clear to auscultation bilaterally; No wheeze  HEART: Regular rate and rhythm; No murmurs, rubs, or gallops  ABDOMEN: Soft, Nontender, Nondistended; Bowel sounds present  EXTREMITIES:  2+ Peripheral Pulses, No clubbing, cyanosis, or edema  PSYCH: AAOx3  NEUROLOGY: non-focal  SKIN: No rashes or lesions

## 2021-01-22 NOTE — ED ADULT NURSE NOTE - OBJECTIVE STATEMENT
Received pt to bed , A+Ox4, ambulatory. C/O cough, SOB, pt states he tested +COVID on Wednesday. Respirations even and labored, accessory muscle use noted, speaking in full clear uninterrupted sentences, tachypneic to 33 breaths per minute, pt remains on non rebreather. ABD is soft, non tender, non distended. Pt denies any chest pain, dizziness, N/V/D, fever, chills.   20G to RAC, Labs sent, Medicated as per MD, will continue to monitor.

## 2021-01-22 NOTE — H&P ADULT - PROBLEM SELECTOR PLAN 6
-Stable. Simvastatin.  -As discussed w/ pharm - dose reduced to 20mg daily as he is on amlodipine.  -Please reassess meds on dispo - needs new Rx.

## 2021-01-22 NOTE — ED PROCEDURE NOTE - NS ED PROCEUDURE1 POST INTUBATION REVIEW
Breath sounds bilateral/Positive end tidal Co2 noted Breath sounds bilateral/Positive end tidal Co2 noted/Chest X-Ray

## 2021-01-22 NOTE — PROCEDURE NOTE - NSINDICATIONS_GEN_A_CORE
arterial puncture to obtain ABG's/cannulation purposes/critical patient
feeds
critical illness/emergency venous access/venous access

## 2021-01-22 NOTE — ED PROVIDER NOTE - OBJECTIVE STATEMENT
PMD: Dr. Pelon Henry    PGY 3 Carlos: Pt is a 71 yo male with DM c/b glaucoma, retinopathy, and periperal neuropathy, HTN, and obesity, who presents to the ED with worsening COVID-19 symptoms. Patient has been having 6 days of symptoms of shortness of breath, cough, and trouble breathing. He noticed that the shortness of breath and cough became substantially worse over the last day, which prompted him to come into the ED.

## 2021-01-22 NOTE — ED PROCEDURE NOTE - ATTENDING CONTRIBUTION TO CARE
MD Smalls:  patient seen and evaluated with the resident.  I agree with the above procedure note.  Progressive increasing WOB throughout ED stay.  MICU consulted by inpatient medicine and requesting ED intubate (high probability of intubation in < 6hrs).  No desaturations during intubation.  1 attempt made. Successful.

## 2021-01-22 NOTE — PROCEDURE NOTE - NSPROCDETAILS_GEN_ALL_CORE
orogastric
location identified, draped/prepped, sterile technique used, needle inserted/introduced/positive blood return obtained via catheter/connected to a pressurized flush line/sutured in place/hemostasis with direct pressure, dressing applied/Seldinger technique/all materials/supplies accounted for at end of procedure
guidewire recovered/lumen(s) aspirated and flushed/sterile dressing applied/sterile technique, catheter placed/ultrasound guidance with use of sterile gel and probe cove

## 2021-01-22 NOTE — ED PROVIDER NOTE - CARE PLAN
Principal Discharge DX:	Acute respiratory disease due to severe acute respiratory syndrome coronavirus 2 (SARS-CoV-2)

## 2021-01-22 NOTE — CHART NOTE - NSCHARTNOTEFT_GEN_A_CORE
Pt seen again at 230PM.    Increased work of breathing. RR 35-40s. Currently on BiPAP. Pt appeared tired. Says he otherwise feels ok. Again discussed code status - confirmed that he is full code.     Considering current trajectory/WOB, concern for impending need for intubation. Case d/w MICU team who will come assess pt.     Will closely monitor.  Will call son now. Pt seen again at 230PM.    Increased work of breathing. RR 35-40s. Currently on BiPAP. Pt appeared tired. Says he otherwise feels ok. Again discussed code status - confirmed that he is full code.     Considering current trajectory/WOB, concern for impending need for intubation. Case d/w MICU team who will come assess pt.     Will closely monitor.  Will call son now.      ---  Unable to reach son - spoke w/ Lisa (dtr in law) who is aware of the plan and situation. All questions/concerns have been addressed.

## 2021-01-22 NOTE — ED PROVIDER NOTE - ATTENDING CONTRIBUTION TO CARE
MD Smalls:  patient seen and evaluated with the resident.  I was present for key portions of the History & Physical, and I agree with the Impression & Plan.  MD Smalls:  71 yo M, c/o of URI-like symptoms:  cough, fever, chills, sore, throat, cough, body aches, runny nose. Context:  known COVID+  Associated Symptoms:  +MORTON 93% on 100% O2 by FM  Duration of symptoms:  6d  Modifiers:  time making it worse  VS:  +hypoxia < 86% on RA.  HR tachy.  Normotensive.    Physical Exam:  adult M +SOB appearing, NCAT, PERRL, EOMI, neck supple, +bilateral rales, abd s/nd/nt, no edema.  AAOx3.  Impression:  Symptomatic COVID pneumonia with associated hypoxia.    Plan: COVID admit bundle, supplemental O2, respiratory isolation.  Will Discuss DNR/DNI status, as his estimated mortality is high (age, DM, HTN, degree of hypoxia).

## 2021-01-22 NOTE — PROCEDURE NOTE - NSPOSTPRCRAD_GEN_A_CORE
central line located in the superior vena cava/no pneumothorax/post-procedure radiography performed
post-procedure radiography performed

## 2021-01-22 NOTE — ED PROVIDER NOTE - CLINICAL SUMMARY MEDICAL DECISION MAKING FREE TEXT BOX
71 yo with numerous comorbidities p/w worsening upper respiratory symptoms i/s/o COVID-19. WIll get COVID-19 labs. Pt will need to be admitted due to acute hypoxic respiratory failure

## 2021-01-22 NOTE — H&P ADULT - PROBLEM SELECTOR PLAN 4
-Has underlying retinopathy and nephropathy.  -Elevated FS on serum, likely 2' steroid.   -Will start basal (75% of home dose)/bolus w/ correctional insulin. Titrate PRN.

## 2021-01-22 NOTE — ED PROVIDER NOTE - PROGRESS NOTE DETAILS
PGY 3 Carlos: Pt noted to be stating in mid 90s on 10L non-rebreather. Will need admission for acute hypoxic respiratory failure

## 2021-01-22 NOTE — ED ADULT TRIAGE NOTE - CHIEF COMPLAINT QUOTE
Arrives via EMS.  Covid-19 positive with SOB x 3 days.  Pt noted to have cough and c/o pain to neck.  Arrives with 100% NRB.  EMS endorses low POX at home, and family stated fever without use of thermometer

## 2021-01-23 LAB
ALBUMIN SERPL ELPH-MCNC: 3.3 G/DL — SIGNIFICANT CHANGE UP (ref 3.3–5)
ALBUMIN SERPL ELPH-MCNC: 3.4 G/DL — SIGNIFICANT CHANGE UP (ref 3.3–5)
ALP SERPL-CCNC: 64 U/L — SIGNIFICANT CHANGE UP (ref 40–120)
ALP SERPL-CCNC: 67 U/L — SIGNIFICANT CHANGE UP (ref 40–120)
ALT FLD-CCNC: 15 U/L — SIGNIFICANT CHANGE UP (ref 4–41)
ALT FLD-CCNC: 15 U/L — SIGNIFICANT CHANGE UP (ref 4–41)
ANION GAP SERPL CALC-SCNC: 11 MMOL/L — SIGNIFICANT CHANGE UP (ref 7–14)
ANION GAP SERPL CALC-SCNC: 13 MMOL/L — SIGNIFICANT CHANGE UP (ref 7–14)
ANION GAP SERPL CALC-SCNC: 21 MMOL/L — HIGH (ref 7–14)
ANION GAP SERPL CALC-SCNC: 25 MMOL/L — HIGH (ref 7–14)
AST SERPL-CCNC: 37 U/L — SIGNIFICANT CHANGE UP (ref 4–40)
AST SERPL-CCNC: 38 U/L — SIGNIFICANT CHANGE UP (ref 4–40)
B-OH-BUTYR SERPL-SCNC: 5.4 MMOL/L — HIGH (ref 0–0.4)
BASOPHILS # BLD AUTO: 0.01 K/UL — SIGNIFICANT CHANGE UP (ref 0–0.2)
BASOPHILS # BLD AUTO: 0.01 K/UL — SIGNIFICANT CHANGE UP (ref 0–0.2)
BASOPHILS # BLD AUTO: 0.02 K/UL — SIGNIFICANT CHANGE UP (ref 0–0.2)
BASOPHILS NFR BLD AUTO: 0.1 % — SIGNIFICANT CHANGE UP (ref 0–2)
BASOPHILS NFR BLD AUTO: 0.1 % — SIGNIFICANT CHANGE UP (ref 0–2)
BASOPHILS NFR BLD AUTO: 0.2 % — SIGNIFICANT CHANGE UP (ref 0–2)
BILIRUB SERPL-MCNC: 0.5 MG/DL — SIGNIFICANT CHANGE UP (ref 0.2–1.2)
BILIRUB SERPL-MCNC: 0.5 MG/DL — SIGNIFICANT CHANGE UP (ref 0.2–1.2)
BLOOD GAS ARTERIAL COMPREHENSIVE RESULT: SIGNIFICANT CHANGE UP
BLOOD GAS ARTERIAL COMPREHENSIVE RESULT: SIGNIFICANT CHANGE UP
BLOOD GAS VENOUS COMPREHENSIVE RESULT: SIGNIFICANT CHANGE UP
BLOOD GAS VENOUS COMPREHENSIVE RESULT: SIGNIFICANT CHANGE UP
BUN SERPL-MCNC: 29 MG/DL — HIGH (ref 7–23)
BUN SERPL-MCNC: 29 MG/DL — HIGH (ref 7–23)
BUN SERPL-MCNC: 30 MG/DL — HIGH (ref 7–23)
BUN SERPL-MCNC: 31 MG/DL — HIGH (ref 7–23)
CALCIUM SERPL-MCNC: 8.5 MG/DL — SIGNIFICANT CHANGE UP (ref 8.4–10.5)
CALCIUM SERPL-MCNC: 8.7 MG/DL — SIGNIFICANT CHANGE UP (ref 8.4–10.5)
CALCIUM SERPL-MCNC: 8.9 MG/DL — SIGNIFICANT CHANGE UP (ref 8.4–10.5)
CALCIUM SERPL-MCNC: 9.1 MG/DL — SIGNIFICANT CHANGE UP (ref 8.4–10.5)
CHLORIDE SERPL-SCNC: 103 MMOL/L — SIGNIFICANT CHANGE UP (ref 98–107)
CHLORIDE SERPL-SCNC: 107 MMOL/L — SIGNIFICANT CHANGE UP (ref 98–107)
CHLORIDE SERPL-SCNC: 95 MMOL/L — LOW (ref 98–107)
CHLORIDE SERPL-SCNC: 97 MMOL/L — LOW (ref 98–107)
CO2 SERPL-SCNC: 15 MMOL/L — LOW (ref 22–31)
CO2 SERPL-SCNC: 17 MMOL/L — LOW (ref 22–31)
CO2 SERPL-SCNC: 23 MMOL/L — SIGNIFICANT CHANGE UP (ref 22–31)
CO2 SERPL-SCNC: 23 MMOL/L — SIGNIFICANT CHANGE UP (ref 22–31)
CREAT SERPL-MCNC: 1.34 MG/DL — HIGH (ref 0.5–1.3)
CREAT SERPL-MCNC: 1.42 MG/DL — HIGH (ref 0.5–1.3)
CREAT SERPL-MCNC: 1.71 MG/DL — HIGH (ref 0.5–1.3)
CREAT SERPL-MCNC: 1.77 MG/DL — HIGH (ref 0.5–1.3)
EOSINOPHIL # BLD AUTO: 0 K/UL — SIGNIFICANT CHANGE UP (ref 0–0.5)
EOSINOPHIL # BLD AUTO: 0 K/UL — SIGNIFICANT CHANGE UP (ref 0–0.5)
EOSINOPHIL # BLD AUTO: 0.01 K/UL — SIGNIFICANT CHANGE UP (ref 0–0.5)
EOSINOPHIL NFR BLD AUTO: 0 % — SIGNIFICANT CHANGE UP (ref 0–6)
EOSINOPHIL NFR BLD AUTO: 0 % — SIGNIFICANT CHANGE UP (ref 0–6)
EOSINOPHIL NFR BLD AUTO: 0.1 % — SIGNIFICANT CHANGE UP (ref 0–6)
GLUCOSE BLDC GLUCOMTR-MCNC: 144 MG/DL — HIGH (ref 70–99)
GLUCOSE BLDC GLUCOMTR-MCNC: 207 MG/DL — HIGH (ref 70–99)
GLUCOSE BLDC GLUCOMTR-MCNC: 219 MG/DL — HIGH (ref 70–99)
GLUCOSE BLDC GLUCOMTR-MCNC: 222 MG/DL — HIGH (ref 70–99)
GLUCOSE BLDC GLUCOMTR-MCNC: 228 MG/DL — HIGH (ref 70–99)
GLUCOSE BLDC GLUCOMTR-MCNC: 228 MG/DL — HIGH (ref 70–99)
GLUCOSE BLDC GLUCOMTR-MCNC: 230 MG/DL — HIGH (ref 70–99)
GLUCOSE BLDC GLUCOMTR-MCNC: 236 MG/DL — HIGH (ref 70–99)
GLUCOSE BLDC GLUCOMTR-MCNC: 238 MG/DL — HIGH (ref 70–99)
GLUCOSE BLDC GLUCOMTR-MCNC: 261 MG/DL — HIGH (ref 70–99)
GLUCOSE BLDC GLUCOMTR-MCNC: 262 MG/DL — HIGH (ref 70–99)
GLUCOSE BLDC GLUCOMTR-MCNC: 270 MG/DL — HIGH (ref 70–99)
GLUCOSE BLDC GLUCOMTR-MCNC: 276 MG/DL — HIGH (ref 70–99)
GLUCOSE BLDC GLUCOMTR-MCNC: 320 MG/DL — HIGH (ref 70–99)
GLUCOSE BLDC GLUCOMTR-MCNC: 355 MG/DL — HIGH (ref 70–99)
GLUCOSE BLDC GLUCOMTR-MCNC: 372 MG/DL — HIGH (ref 70–99)
GLUCOSE BLDC GLUCOMTR-MCNC: 389 MG/DL — HIGH (ref 70–99)
GLUCOSE BLDC GLUCOMTR-MCNC: 411 MG/DL — HIGH (ref 70–99)
GLUCOSE BLDC GLUCOMTR-MCNC: 431 MG/DL — HIGH (ref 70–99)
GLUCOSE BLDC GLUCOMTR-MCNC: 435 MG/DL — HIGH (ref 70–99)
GLUCOSE BLDC GLUCOMTR-MCNC: 447 MG/DL — HIGH (ref 70–99)
GLUCOSE BLDC GLUCOMTR-MCNC: 488 MG/DL — CRITICAL HIGH (ref 70–99)
GLUCOSE BLDC GLUCOMTR-MCNC: 498 MG/DL — CRITICAL HIGH (ref 70–99)
GLUCOSE BLDC GLUCOMTR-MCNC: 508 MG/DL — CRITICAL HIGH (ref 70–99)
GLUCOSE SERPL-MCNC: 248 MG/DL — HIGH (ref 70–99)
GLUCOSE SERPL-MCNC: 257 MG/DL — HIGH (ref 70–99)
GLUCOSE SERPL-MCNC: 399 MG/DL — HIGH (ref 70–99)
GLUCOSE SERPL-MCNC: 504 MG/DL — CRITICAL HIGH (ref 70–99)
GRAM STN FLD: SIGNIFICANT CHANGE UP
HCT VFR BLD CALC: 37.5 % — LOW (ref 39–50)
HCT VFR BLD CALC: 42.6 % — SIGNIFICANT CHANGE UP (ref 39–50)
HGB BLD-MCNC: 11.8 G/DL — LOW (ref 13–17)
HGB BLD-MCNC: 12.9 G/DL — LOW (ref 13–17)
IANC: 10.42 K/UL — HIGH (ref 1.5–8.5)
IANC: 11.44 K/UL — HIGH (ref 1.5–8.5)
IMM GRANULOCYTES NFR BLD AUTO: 0.6 % — SIGNIFICANT CHANGE UP (ref 0–1.5)
LYMPHOCYTES # BLD AUTO: 0.69 K/UL — LOW (ref 1–3.3)
LYMPHOCYTES # BLD AUTO: 0.73 K/UL — LOW (ref 1–3.3)
LYMPHOCYTES # BLD AUTO: 0.98 K/UL — LOW (ref 1–3.3)
LYMPHOCYTES # BLD AUTO: 5.3 % — LOW (ref 13–44)
LYMPHOCYTES # BLD AUTO: 6.1 % — LOW (ref 13–44)
LYMPHOCYTES # BLD AUTO: 7.8 % — LOW (ref 13–44)
MCHC RBC-ENTMCNC: 25.8 PG — LOW (ref 27–34)
MCHC RBC-ENTMCNC: 26 PG — LOW (ref 27–34)
MCHC RBC-ENTMCNC: 30.3 GM/DL — LOW (ref 32–36)
MCHC RBC-ENTMCNC: 31.5 GM/DL — LOW (ref 32–36)
MCV RBC AUTO: 82.8 FL — SIGNIFICANT CHANGE UP (ref 80–100)
MCV RBC AUTO: 85.2 FL — SIGNIFICANT CHANGE UP (ref 80–100)
MONOCYTES # BLD AUTO: 0.79 K/UL — SIGNIFICANT CHANGE UP (ref 0–0.9)
MONOCYTES # BLD AUTO: 0.86 K/UL — SIGNIFICANT CHANGE UP (ref 0–0.9)
MONOCYTES # BLD AUTO: 0.87 K/UL — SIGNIFICANT CHANGE UP (ref 0–0.9)
MONOCYTES NFR BLD AUTO: 6.6 % — SIGNIFICANT CHANGE UP (ref 2–14)
MONOCYTES NFR BLD AUTO: 6.6 % — SIGNIFICANT CHANGE UP (ref 2–14)
MONOCYTES NFR BLD AUTO: 7 % — SIGNIFICANT CHANGE UP (ref 2–14)
NEUTROPHILS # BLD AUTO: 10.42 K/UL — HIGH (ref 1.8–7.4)
NEUTROPHILS # BLD AUTO: 10.55 K/UL — HIGH (ref 1.8–7.4)
NEUTROPHILS # BLD AUTO: 11.44 K/UL — HIGH (ref 1.8–7.4)
NEUTROPHILS NFR BLD AUTO: 84.3 % — HIGH (ref 43–77)
NEUTROPHILS NFR BLD AUTO: 86.6 % — HIGH (ref 43–77)
NEUTROPHILS NFR BLD AUTO: 87.4 % — HIGH (ref 43–77)
NRBC # BLD: 0 /100 WBCS — SIGNIFICANT CHANGE UP
NRBC # FLD: 0 K/UL — SIGNIFICANT CHANGE UP
PLATELET # BLD AUTO: 270 K/UL — SIGNIFICANT CHANGE UP (ref 150–400)
PLATELET # BLD AUTO: 291 K/UL — SIGNIFICANT CHANGE UP (ref 150–400)
POTASSIUM SERPL-MCNC: 4 MMOL/L — SIGNIFICANT CHANGE UP (ref 3.5–5.3)
POTASSIUM SERPL-MCNC: 4.1 MMOL/L — SIGNIFICANT CHANGE UP (ref 3.5–5.3)
POTASSIUM SERPL-MCNC: 4.1 MMOL/L — SIGNIFICANT CHANGE UP (ref 3.5–5.3)
POTASSIUM SERPL-MCNC: 4.2 MMOL/L — SIGNIFICANT CHANGE UP (ref 3.5–5.3)
POTASSIUM SERPL-SCNC: 4 MMOL/L — SIGNIFICANT CHANGE UP (ref 3.5–5.3)
POTASSIUM SERPL-SCNC: 4.1 MMOL/L — SIGNIFICANT CHANGE UP (ref 3.5–5.3)
POTASSIUM SERPL-SCNC: 4.1 MMOL/L — SIGNIFICANT CHANGE UP (ref 3.5–5.3)
POTASSIUM SERPL-SCNC: 4.2 MMOL/L — SIGNIFICANT CHANGE UP (ref 3.5–5.3)
PROT SERPL-MCNC: 7.1 G/DL — SIGNIFICANT CHANGE UP (ref 6–8.3)
PROT SERPL-MCNC: 7.3 G/DL — SIGNIFICANT CHANGE UP (ref 6–8.3)
RBC # BLD: 4.53 M/UL — SIGNIFICANT CHANGE UP (ref 4.2–5.8)
RBC # BLD: 5 M/UL — SIGNIFICANT CHANGE UP (ref 4.2–5.8)
RBC # FLD: 14.6 % — HIGH (ref 10.3–14.5)
RBC # FLD: 14.8 % — HIGH (ref 10.3–14.5)
SODIUM SERPL-SCNC: 135 MMOL/L — SIGNIFICANT CHANGE UP (ref 135–145)
SODIUM SERPL-SCNC: 135 MMOL/L — SIGNIFICANT CHANGE UP (ref 135–145)
SODIUM SERPL-SCNC: 139 MMOL/L — SIGNIFICANT CHANGE UP (ref 135–145)
SODIUM SERPL-SCNC: 141 MMOL/L — SIGNIFICANT CHANGE UP (ref 135–145)
SPECIMEN SOURCE: SIGNIFICANT CHANGE UP
WBC # BLD: 12.02 K/UL — HIGH (ref 3.8–10.5)
WBC # BLD: 13.08 K/UL — HIGH (ref 3.8–10.5)
WBC # FLD AUTO: 12.02 K/UL — HIGH (ref 3.8–10.5)
WBC # FLD AUTO: 13.08 K/UL — HIGH (ref 3.8–10.5)

## 2021-01-23 PROCEDURE — 99291 CRITICAL CARE FIRST HOUR: CPT

## 2021-01-23 RX ORDER — HUMAN INSULIN 100 [IU]/ML
15 INJECTION, SUSPENSION SUBCUTANEOUS EVERY 6 HOURS
Refills: 0 | Status: DISCONTINUED | OUTPATIENT
Start: 2021-01-23 | End: 2021-01-24

## 2021-01-23 RX ORDER — DEXTROSE 50 % IN WATER 50 %
15 SYRINGE (ML) INTRAVENOUS ONCE
Refills: 0 | Status: DISCONTINUED | OUTPATIENT
Start: 2021-01-23 | End: 2021-01-24

## 2021-01-23 RX ORDER — INSULIN LISPRO 100/ML
VIAL (ML) SUBCUTANEOUS AT BEDTIME
Refills: 0 | Status: DISCONTINUED | OUTPATIENT
Start: 2021-01-23 | End: 2021-01-24

## 2021-01-23 RX ORDER — SODIUM CHLORIDE 9 MG/ML
1000 INJECTION, SOLUTION INTRAVENOUS
Refills: 0 | Status: DISCONTINUED | OUTPATIENT
Start: 2021-01-23 | End: 2021-01-24

## 2021-01-23 RX ORDER — DEXTROSE 50 % IN WATER 50 %
12.5 SYRINGE (ML) INTRAVENOUS ONCE
Refills: 0 | Status: DISCONTINUED | OUTPATIENT
Start: 2021-01-23 | End: 2021-01-24

## 2021-01-23 RX ORDER — INSULIN LISPRO 100/ML
VIAL (ML) SUBCUTANEOUS
Refills: 0 | Status: DISCONTINUED | OUTPATIENT
Start: 2021-01-23 | End: 2021-01-24

## 2021-01-23 RX ORDER — HUMAN INSULIN 100 [IU]/ML
30 INJECTION, SUSPENSION SUBCUTANEOUS EVERY 6 HOURS
Refills: 0 | Status: DISCONTINUED | OUTPATIENT
Start: 2021-01-23 | End: 2021-01-23

## 2021-01-23 RX ORDER — DEXTROSE 50 % IN WATER 50 %
25 SYRINGE (ML) INTRAVENOUS ONCE
Refills: 0 | Status: DISCONTINUED | OUTPATIENT
Start: 2021-01-23 | End: 2021-01-24

## 2021-01-23 RX ORDER — SODIUM CHLORIDE 9 MG/ML
1000 INJECTION, SOLUTION INTRAVENOUS ONCE
Refills: 0 | Status: COMPLETED | OUTPATIENT
Start: 2021-01-23 | End: 2021-01-23

## 2021-01-23 RX ORDER — INSULIN HUMAN 100 [IU]/ML
3 INJECTION, SOLUTION SUBCUTANEOUS
Qty: 100 | Refills: 0 | Status: DISCONTINUED | OUTPATIENT
Start: 2021-01-23 | End: 2021-01-24

## 2021-01-23 RX ORDER — POTASSIUM PHOSPHATE, MONOBASIC POTASSIUM PHOSPHATE, DIBASIC 236; 224 MG/ML; MG/ML
15 INJECTION, SOLUTION INTRAVENOUS ONCE
Refills: 0 | Status: COMPLETED | OUTPATIENT
Start: 2021-01-23 | End: 2021-01-23

## 2021-01-23 RX ORDER — SENNA PLUS 8.6 MG/1
10 TABLET ORAL DAILY
Refills: 0 | Status: DISCONTINUED | OUTPATIENT
Start: 2021-01-23 | End: 2021-01-24

## 2021-01-23 RX ORDER — DEXTROSE MONOHYDRATE, SODIUM CHLORIDE, AND POTASSIUM CHLORIDE 50; .745; 4.5 G/1000ML; G/1000ML; G/1000ML
1000 INJECTION, SOLUTION INTRAVENOUS
Refills: 0 | Status: DISCONTINUED | OUTPATIENT
Start: 2021-01-23 | End: 2021-01-23

## 2021-01-23 RX ORDER — GLUCAGON INJECTION, SOLUTION 0.5 MG/.1ML
1 INJECTION, SOLUTION SUBCUTANEOUS ONCE
Refills: 0 | Status: DISCONTINUED | OUTPATIENT
Start: 2021-01-23 | End: 2021-01-24

## 2021-01-23 RX ORDER — SODIUM CHLORIDE 9 MG/ML
1000 INJECTION, SOLUTION INTRAVENOUS
Refills: 0 | Status: DISCONTINUED | OUTPATIENT
Start: 2021-01-23 | End: 2021-01-23

## 2021-01-23 RX ORDER — POLYETHYLENE GLYCOL 3350 17 G/17G
17 POWDER, FOR SOLUTION ORAL DAILY
Refills: 0 | Status: DISCONTINUED | OUTPATIENT
Start: 2021-01-23 | End: 2021-01-25

## 2021-01-23 RX ADMIN — FENTANYL CITRATE 4.13 MICROGRAM(S)/KG/HR: 50 INJECTION INTRAVENOUS at 07:54

## 2021-01-23 RX ADMIN — SODIUM CHLORIDE 9999 MILLILITER(S): 9 INJECTION, SOLUTION INTRAVENOUS at 10:00

## 2021-01-23 RX ADMIN — PROPOFOL 15.5 MICROGRAM(S)/KG/MIN: 10 INJECTION, EMULSION INTRAVENOUS at 03:47

## 2021-01-23 RX ADMIN — HUMAN INSULIN 15 UNIT(S): 100 INJECTION, SUSPENSION SUBCUTANEOUS at 23:13

## 2021-01-23 RX ADMIN — POTASSIUM PHOSPHATE, MONOBASIC POTASSIUM PHOSPHATE, DIBASIC 62.5 MILLIMOLE(S): 236; 224 INJECTION, SOLUTION INTRAVENOUS at 12:30

## 2021-01-23 RX ADMIN — CHLORHEXIDINE GLUCONATE 15 MILLILITER(S): 213 SOLUTION TOPICAL at 06:25

## 2021-01-23 RX ADMIN — SIMVASTATIN 20 MILLIGRAM(S): 20 TABLET, FILM COATED ORAL at 21:44

## 2021-01-23 RX ADMIN — CHLORHEXIDINE GLUCONATE 15 MILLILITER(S): 213 SOLUTION TOPICAL at 16:15

## 2021-01-23 RX ADMIN — CLOPIDOGREL BISULFATE 75 MILLIGRAM(S): 75 TABLET, FILM COATED ORAL at 13:10

## 2021-01-23 RX ADMIN — REMDESIVIR 500 MILLIGRAM(S): 5 INJECTION INTRAVENOUS at 21:50

## 2021-01-23 RX ADMIN — SENNA PLUS 10 MILLILITER(S): 8.6 TABLET ORAL at 16:14

## 2021-01-23 RX ADMIN — TAMSULOSIN HYDROCHLORIDE 0.4 MILLIGRAM(S): 0.4 CAPSULE ORAL at 21:44

## 2021-01-23 RX ADMIN — PROPOFOL 15.5 MICROGRAM(S)/KG/MIN: 10 INJECTION, EMULSION INTRAVENOUS at 04:16

## 2021-01-23 RX ADMIN — INSULIN HUMAN 14 UNIT(S)/HR: 100 INJECTION, SOLUTION SUBCUTANEOUS at 07:53

## 2021-01-23 RX ADMIN — POLYETHYLENE GLYCOL 3350 17 GRAM(S): 17 POWDER, FOR SOLUTION ORAL at 13:10

## 2021-01-23 RX ADMIN — CISATRACURIUM BESYLATE 14.9 MICROGRAM(S)/KG/MIN: 2 INJECTION INTRAVENOUS at 07:53

## 2021-01-23 RX ADMIN — Medication 145 MILLIGRAM(S): at 13:10

## 2021-01-23 RX ADMIN — TAMSULOSIN HYDROCHLORIDE 0.4 MILLIGRAM(S): 0.4 CAPSULE ORAL at 00:29

## 2021-01-23 RX ADMIN — ENOXAPARIN SODIUM 40 MILLIGRAM(S): 100 INJECTION SUBCUTANEOUS at 13:10

## 2021-01-23 RX ADMIN — CHLORHEXIDINE GLUCONATE 15 MILLILITER(S): 213 SOLUTION TOPICAL at 00:29

## 2021-01-23 RX ADMIN — SODIUM CHLORIDE 75 MILLILITER(S): 9 INJECTION, SOLUTION INTRAVENOUS at 21:29

## 2021-01-23 RX ADMIN — CHLORHEXIDINE GLUCONATE 1 APPLICATION(S): 213 SOLUTION TOPICAL at 00:29

## 2021-01-23 RX ADMIN — PROPOFOL 15.5 MICROGRAM(S)/KG/MIN: 10 INJECTION, EMULSION INTRAVENOUS at 07:54

## 2021-01-23 RX ADMIN — Medication 6 MILLIGRAM(S): at 06:44

## 2021-01-23 RX ADMIN — PROPOFOL 15.5 MICROGRAM(S)/KG/MIN: 10 INJECTION, EMULSION INTRAVENOUS at 00:28

## 2021-01-23 RX ADMIN — MIDAZOLAM HYDROCHLORIDE 1.65 MG/KG/HR: 1 INJECTION, SOLUTION INTRAMUSCULAR; INTRAVENOUS at 07:54

## 2021-01-23 RX ADMIN — SIMVASTATIN 20 MILLIGRAM(S): 20 TABLET, FILM COATED ORAL at 00:29

## 2021-01-23 RX ADMIN — Medication 7.74 MICROGRAM(S)/KG/MIN: at 07:55

## 2021-01-23 RX ADMIN — CHLORHEXIDINE GLUCONATE 1 APPLICATION(S): 213 SOLUTION TOPICAL at 07:52

## 2021-01-23 RX ADMIN — Medication 7.74 MICROGRAM(S)/KG/MIN: at 05:13

## 2021-01-23 RX ADMIN — Medication 81 MILLIGRAM(S): at 00:29

## 2021-01-23 RX ADMIN — MIDAZOLAM HYDROCHLORIDE 1.65 MG/KG/HR: 1 INJECTION, SOLUTION INTRAMUSCULAR; INTRAVENOUS at 04:16

## 2021-01-23 RX ADMIN — Medication 4: at 23:35

## 2021-01-23 RX ADMIN — Medication 81 MILLIGRAM(S): at 13:10

## 2021-01-23 RX ADMIN — PROPOFOL 15.5 MICROGRAM(S)/KG/MIN: 10 INJECTION, EMULSION INTRAVENOUS at 21:28

## 2021-01-23 NOTE — PROGRESS NOTE ADULT - ASSESSMENT
The patient is a 70-year-old man with a past medical history of hypertension, hyperlipidemia, and coronary artery disease who presented to the emergency department with acute respiratory failure with hypoxia secondary to covid-19-associated pneumonia whose condition has progressed to severe acute respiratory distress syndrome requiring endotracheal intubation and management on a mechanical ventilator.     NEUROLOGIC: -The patient is currently sedated with a propofol continuous infusion; rass -5 at this time; will target rass -5 to allow for complete assist control and lung-protective strategy  -If requires additional sedation will administer fentanyl and then midazolam; if p:f ratio less than 100, will administer cisatracurium for paralysis and then will place in prone position     CARDIOVASCULAR: -Hemodynamically stable at this time not requiring intravenous blood pressure support; will place midline or central line to allow for administration of intravenous blood pressure support if necessary     RESPIRATORY: -Acute hypoxic respiratory failure secondary to covid-19, which has progressed to severe acute respiratory distress syndrome; will order arterial blood gas one hour following transition of care to medical intensive care unit and will calculate p:f ratio at that time; estimated pulmonary compliance 22.5 mL/mm Hg at this time   -Current ventilator settings--AC/CMV, Tv 450, peep 8, rr 22, fio2 100    INFECTIOUS DISEASE: -COVID-19 pneumonia; administering dexamethasone 6 mg daily for ten-day course or until end of hospitalization and remdesivir 200 mg today followed by 100 mg daily for the next four days  -Serum procalcitonin marginally elevated but no focal consolidation on chest xray and no other focal signs of acute bacterial infection; will manage off of antibiotics at this time; will assess d dimer, ldh, crp, ferritin every 48 hours and consider antibiotic therapy if brisk elevation     GI: -Will place orogastric tube today and administer glucerna tube feeds   -Will monitor bowel movements     Urinary tract: -Oswald catheter placed today; will monitor strict intake and output and assess metabolic panel daily; no acid-base or electrolyte concerns at the time of admission     HEMATOLOGIC: -DVT prophylaxis with lovenox 40 subcutaneous daily; no anemia, leukocytosis, leukopenia, or thrombocytopenia     SKIN: -No known decubiti at the time of admission   -Peripheral lines only at this time; will place central venous access and arterial line. The patient is a 70-year-old man with a past medical history of hypertension, hyperlipidemia, and coronary artery disease who presented to the emergency department with acute respiratory failure with hypoxia secondary to covid-19-associated pneumonia whose condition has progressed to severe acute respiratory distress syndrome requiring endotracheal intubation and management on a mechanical ventilator.     NEUROLOGIC: -The patient is currently sedated with propofol, midazolam, fentanyl; neuromuscular blockade with cisatracurium; rass -5 at this time; will target rass -5 to allow for complete assist control and lung-protective strategy  -Will consider transition to prone positioning      CARDIOVASCULAR: -Vasoplegic shock state requiring administration of norepinephrine to maintain map greater than 65; shock state vasoplegic in the setting of administration of sedative medications   -Unspecified history of coronary artery disease; administering aspirin and plavix; no history of arrhythmia; sinus rhythm on telemetry     RESPIRATORY: -Acute hypoxic respiratory failure secondary to covid-19, which has progressed to severe acute respiratory distress syndrome; p:f ratio about 120 on today's blood gas; atelectasis noted at bilateral lung bases on pocus; will consider proning if p:f ratio does not improve; estimated pulmonary compliance 31 mL/mm Hg at this time   -High anion gap metabolic acidosis in the setting of diabetic ketoacidosis; appropriate respiratory compensation at this time   -Current ventilator settings--AC/CMV, Tv 450, peep 8, rr 22, fio2 100    INFECTIOUS DISEASE: -COVID-19 pneumonia; administering dexamethasone 6 mg daily for ten-day course or until end of hospitalization and remdesivir 200 mg today followed by 100 mg daily for the next four days  -Serum procalcitonin marginally elevated but no focal consolidation on chest xray and no other focal signs of acute bacterial infection; will manage off of antibiotics at this time; will assess d dimer, ldh, crp, ferritin every 48 hours and consider antibiotic therapy if brisk elevation     GI: -Will place orogastric tube today and administer glucerna tube feeds   -Will monitor bowel movements     Urinary tract: -Oswald catheter placed today; will monitor strict intake and output and assess metabolic panel daily; no acid-base or electrolyte concerns at the time of admission     HEMATOLOGIC: -DVT prophylaxis with lovenox 40 subcutaneous daily; no anemia, leukocytosis, leukopenia, or thrombocytopenia     SKIN: -No known decubiti at the time of admission   -Peripheral lines only at this time; will place central venous access and arterial line. The patient is a 70-year-old man with a past medical history of hypertension, hyperlipidemia, and coronary artery disease who presented to the emergency department with acute respiratory failure with hypoxia secondary to covid-19-associated pneumonia whose condition has progressed to severe acute respiratory distress syndrome requiring endotracheal intubation and management on a mechanical ventilator.     NEUROLOGIC: -The patient is currently sedated with propofol, midazolam, fentanyl; neuromuscular blockade with cisatracurium; rass -5 at this time; will target rass -5 to allow for complete assist control and lung-protective strategy  -Will consider transition to prone positioning      CARDIOVASCULAR: -Vasoplegic shock state requiring administration of norepinephrine to maintain map greater than 65; shock state vasoplegic in the setting of administration of sedative medications   -Unspecified history of coronary artery disease; administering aspirin and plavix; no history of arrhythmia; sinus rhythm on telemetry     RESPIRATORY: -Acute hypoxic respiratory failure secondary to covid-19, which has progressed to severe acute respiratory distress syndrome; p:f ratio about 120 on today's blood gas; atelectasis noted at bilateral lung bases on pocus; will consider proning if p:f ratio does not improve; estimated pulmonary compliance 31 mL/mm Hg at this time   -High anion gap metabolic acidosis in the setting of diabetic ketoacidosis; appropriate respiratory compensation at this time   -Current ventilator settings--AC/CMV, Tv 440, peep 12, rr 28, fio2 70    INFECTIOUS DISEASE: -COVID-19 pneumonia; administering dexamethasone 6 mg daily for ten-day course or until end of hospitalization and remdesivir 200 mg today followed by 100 mg daily for the next four days (day two of ten and five, respectively, today)   -Serum procalcitonin marginally elevated but no focal consolidation on chest xray and no other focal signs of acute bacterial infection; will manage off of antibiotics at this time; will assess d dimer, ldh, crp, ferritin every 48 hours and consider antibiotic therapy if brisk elevation     GI: -Will place orogastric tube today and administer glucerna tube feeds   -Will monitor bowel movements     Urinary tract: -Oswald catheter placed; will monitor strict intake and output and assess metabolic panel daily  -High anion gap metabolic acidosis secondary to diabetic ketoacidosis; respiratory compensation appropriate; will decrease tidal volume to decrease minute ventilation and allow for some permissive hypercapnia per [employer] protocol     Endo: -History of type II diabetes mellitus noted; clinically in diabetic ketoacidosis given elevated elevated blood glucose to greater than 200, bicarbonate less than 18, and venous pH less than 7.25; managing at this time with continuous intravenous infusion of insulin; will draw q4h bmp and q1h fingerstick glucose     HEMATOLOGIC: -DVT prophylaxis with lovenox 40 subcutaneous daily; no anemia, leukocytosis, leukopenia, or thrombocytopenia     SKIN: -No known decubiti at the time of admission   -Right radial arterial line and right internal jugular venous catheter in place  The patient is a 70-year-old man with a past medical history of hypertension, hyperlipidemia, and coronary artery disease who presented to the emergency department with acute respiratory failure with hypoxia secondary to covid-19-associated pneumonia whose condition has progressed to severe acute respiratory distress syndrome requiring endotracheal intubation and management on a mechanical ventilator.     NEUROLOGIC: -The patient is currently sedated with propofol, midazolam, fentanyl; neuromuscular blockade with cisatracurium; rass -5 at this time; will target rass -5 to allow for complete assist control and lung-protective strategy  -Will consider transition to prone positioning      CARDIOVASCULAR: -Vasoplegic shock state requiring administration of norepinephrine to maintain map greater than 65; shock state vasoplegic in the setting of administration of sedative medications   -Unspecified history of coronary artery disease; administering aspirin and plavix; no history of arrhythmia; sinus rhythm on telemetry     RESPIRATORY: -Acute hypoxic respiratory failure secondary to covid-19, which has progressed to severe acute respiratory distress syndrome; p:f ratio about 120 on today's blood gas; atelectasis noted at bilateral lung bases on pocus; will consider proning if p:f ratio does not improve; estimated pulmonary compliance 31 mL/mm Hg at this time   -High anion gap metabolic acidosis in the setting of diabetic ketoacidosis; appropriate respiratory compensation at this time   -Current ventilator settings--AC/CMV, Tv 440, peep 12, rr 28, fio2 70    INFECTIOUS DISEASE: -COVID-19 pneumonia; administering dexamethasone 6 mg daily for ten-day course or until end of hospitalization and remdesivir 200 mg today followed by 100 mg daily for the next four days (day two of ten and five, respectively, today)   -Serum procalcitonin marginally elevated but no focal consolidation on chest xray and no other focal signs of acute bacterial infection; will manage off of antibiotics at this time; will assess d dimer, ldh, crp, ferritin every 48 hours and consider antibiotic therapy if brisk elevation     GI: -Will place orogastric tube today and administer glucerna tube feeds   -Will monitor bowel movements     Urinary tract: -Oswald catheter placed; will monitor strict intake and output and assess metabolic panel daily  -Serum creatinine elevation noted; most likely represents pre-renal azotemia in setting of hypotension from medication administration and clinical sepsis; managing by supporting blood pressure as above   -High anion gap metabolic acidosis secondary to diabetic ketoacidosis; respiratory compensation appropriate; will decrease tidal volume to decrease minute ventilation and allow for some permissive hypercapnia per [employer] protocol     Endo: -History of type II diabetes mellitus noted; clinically in diabetic ketoacidosis given elevated elevated blood glucose to greater than 200, bicarbonate less than 18, and venous pH less than 7.25; managing at this time with continuous intravenous infusion of insulin; will draw q4h bmp and q1h fingerstick glucose     HEMATOLOGIC: -DVT prophylaxis with lovenox 40 subcutaneous daily; no anemia, leukocytosis, leukopenia, or thrombocytopenia     SKIN: -No known decubiti at the time of admission   -Right radial arterial line and right internal jugular venous catheter in place

## 2021-01-23 NOTE — PROGRESS NOTE ADULT - SUBJECTIVE AND OBJECTIVE BOX
Almas Hebert pgy1  [id number]    INTERVAL HPI/OVERNIGHT EVENTS:    SUBJECTIVE: Patient seen and examined at bedside.     CONSTITUTIONAL: No weakness, fevers or chills  EYES/ENT: No visual changes;  No vertigo or throat pain   NECK: No pain or stiffness  RESPIRATORY: No cough, wheezing, hemoptysis; No shortness of breath  CARDIOVASCULAR: No chest pain or palpitations  GASTROINTESTINAL: No abdominal or epigastric pain. No nausea, vomiting, or hematemesis; No diarrhea or constipation. No melena or hematochezia.  GENITOURINARY: No dysuria, frequency or hematuria  NEUROLOGICAL: No numbness or weakness  SKIN: No itching, rashes    OBJECTIVE:    VITAL SIGNS:  ICU Vital Signs Last 24 Hrs  T(C): 36.6 (23 Jan 2021 04:00), Max: 37.3 (22 Jan 2021 17:49)  T(F): 97.9 (23 Jan 2021 04:00), Max: 99.1 (22 Jan 2021 17:49)  HR: 71 (23 Jan 2021 07:01) (64 - 119)  BP: 115/51 (23 Jan 2021 07:00) (94/49 - 210/86)  BP(mean): 67 (23 Jan 2021 07:00) (60 - 119)  ABP: 121/55 (23 Jan 2021 07:00) (79/43 - 198/87)  ABP(mean): 73 (23 Jan 2021 07:00) (56 - 126)  RR: 24 (23 Jan 2021 07:00) (16 - 37)  SpO2: 96% (23 Jan 2021 07:01) (85% - 99%)    Mode: AC/ CMV (Assist Control/ Continuous Mandatory Ventilation), RR (machine): 24, TV (machine): 450, FiO2: 80, PEEP: 12, ITime: 0.6, MAP: 18, PIP: 35    01-22 @ 07:01  -  01-23 @ 07:00  --------------------------------------------------------  IN: 1597.2 mL / OUT: 1455 mL / NET: 142.2 mL    01-23 @ 07:01 - 01-23 @ 07:11  --------------------------------------------------------  IN: 14 mL / OUT: 0 mL / NET: 14 mL      CAPILLARY BLOOD GLUCOSE      POCT Blood Glucose.: 389 mg/dL (23 Jan 2021 06:55)      PHYSICAL EXAM:    General: NAD  HEENT: NC/AT; PERRL, clear conjunctiva  Neck: supple  Respiratory: CTA b/l  Cardiovascular: +S1/S2; RRR  Abdomen: soft, NT/ND; +BS x4  Extremities: WWP, 2+ peripheral pulses b/l; no LE edema  Skin: normal color and turgor; no rash  Neurological:    MEDICATIONS:  MEDICATIONS  (STANDING):  aspirin  chewable 81 milliGRAM(s) Oral daily  chlorhexidine 0.12% Liquid 15 milliLiter(s) Oral Mucosa every 12 hours  chlorhexidine 4% Liquid 1 Application(s) Topical <User Schedule>  cisatracurium Infusion 3 MICROgram(s)/kG/Min (14.9 mL/Hr) IV Continuous <Continuous>  clopidogrel Tablet 75 milliGRAM(s) Oral daily  dexAMETHasone     Tablet 6 milliGRAM(s) Oral daily  enoxaparin Injectable 40 milliGRAM(s) SubCutaneous daily  fenofibrate Tablet 145 milliGRAM(s) Oral daily  fentaNYL   Infusion. 0.5 MICROgram(s)/kG/Hr (4.13 mL/Hr) IV Continuous <Continuous>  glucagon  Injectable 1 milliGRAM(s) IntraMuscular once  glucagon  Injectable 1 milliGRAM(s) IntraMuscular once  insulin regular Infusion 14 Unit(s)/Hr (14 mL/Hr) IV Continuous <Continuous>  midazolam Infusion 0.02 mG/kG/Hr (1.65 mL/Hr) IV Continuous <Continuous>  norepinephrine Infusion 0.05 MICROgram(s)/kG/Min (7.74 mL/Hr) IV Continuous <Continuous>  propofol Infusion 30 MICROgram(s)/kG/Min (15.5 mL/Hr) IV Continuous <Continuous>  remdesivir  IVPB   IV Intermittent   remdesivir  IVPB 100 milliGRAM(s) IV Intermittent every 24 hours  simvastatin 20 milliGRAM(s) Oral at bedtime  tamsulosin 0.4 milliGRAM(s) Oral at bedtime    MEDICATIONS  (PRN):  sodium chloride 0.9% lock flush 10 milliLiter(s) IV Push every 1 hour PRN Pre/post blood products, medications, blood draw, and to maintain line patency      ALLERGIES:  Allergies    No Known Allergies    Intolerances        LABS:                        12.9   12.02 )-----------( 291      ( 23 Jan 2021 04:45 )             42.6     01-23    135  |  95<L>  |  29<H>  ----------------------------<  504<HH>  4.2   |  15<L>  |  1.71<H>    Ca    9.1      23 Jan 2021 04:45  Phos  5.7     01-22  Mg     2.0     01-22    TPro  7.3  /  Alb  3.4  /  TBili  0.5  /  DBili  x   /  AST  38  /  ALT  15  /  AlkPhos  67  01-23          RADIOLOGY & ADDITIONAL TESTS: Reviewed. Almas Hebert pgy1  [id number]    INTERVAL HPI/OVERNIGHT EVENTS: The patient was admitted to the medical intensive care unit for management of severe acute respiratory distress syndrome secondary to covid-19-associated pneumonia.     SUBJECTIVE: Patient seen and examined at bedside.     Could not obtain accurate review of systems as the patient is sedated and intubated.     OBJECTIVE:    VITAL SIGNS:  ICU Vital Signs Last 24 Hrs  T(C): 36.6 (23 Jan 2021 04:00), Max: 37.3 (22 Jan 2021 17:49)  T(F): 97.9 (23 Jan 2021 04:00), Max: 99.1 (22 Jan 2021 17:49)  HR: 71 (23 Jan 2021 07:01) (64 - 119)  BP: 115/51 (23 Jan 2021 07:00) (94/49 - 210/86)  BP(mean): 67 (23 Jan 2021 07:00) (60 - 119)  ABP: 121/55 (23 Jan 2021 07:00) (79/43 - 198/87)  ABP(mean): 73 (23 Jan 2021 07:00) (56 - 126)  RR: 24 (23 Jan 2021 07:00) (16 - 37)  SpO2: 96% (23 Jan 2021 07:01) (85% - 99%)    Mode: AC/ CMV (Assist Control/ Continuous Mandatory Ventilation), RR (machine): 24, TV (machine): 450, FiO2: 80, PEEP: 12, ITime: 0.6, MAP: 18, PIP: 35    01-22 @ 07:01 - 01-23 @ 07:00  --------------------------------------------------------  IN: 1597.2 mL / OUT: 1455 mL / NET: 142.2 mL    01-23 @ 07:01 - 01-23 @ 07:11  --------------------------------------------------------  IN: 14 mL / OUT: 0 mL / NET: 14 mL      CAPILLARY BLOOD GLUCOSE      POCT Blood Glucose.: 389 mg/dL (23 Jan 2021 06:55)      PHYSICAL EXAM:    General: Sedated and intubated; not initiating own respirations   HEENT: PERRL grossly and about 3 mm bilaterally, clear conjunctiva  Neck: right internal jugular venous catheter in place; no lymphadenopathy   Respiratory: Equal chest rise bilaterally; tympanic to percussion; no crepitus   Cardiovascular: Point of maximal impulse not displaced; extremities warm and well perfused   Abdomen: soft, NT/ND; +BS x4  Extremities: WWP, 2+ peripheral pulses b/l; no LE edema  Skin: normal color and turgor; no rash  Neurological: RASS -5; pupils equal and round     MEDICATIONS:  MEDICATIONS  (STANDING):  aspirin  chewable 81 milliGRAM(s) Oral daily  chlorhexidine 0.12% Liquid 15 milliLiter(s) Oral Mucosa every 12 hours  chlorhexidine 4% Liquid 1 Application(s) Topical <User Schedule>  cisatracurium Infusion 3 MICROgram(s)/kG/Min (14.9 mL/Hr) IV Continuous <Continuous>  clopidogrel Tablet 75 milliGRAM(s) Oral daily  dexAMETHasone     Tablet 6 milliGRAM(s) Oral daily  enoxaparin Injectable 40 milliGRAM(s) SubCutaneous daily  fenofibrate Tablet 145 milliGRAM(s) Oral daily  fentaNYL   Infusion. 0.5 MICROgram(s)/kG/Hr (4.13 mL/Hr) IV Continuous <Continuous>  glucagon  Injectable 1 milliGRAM(s) IntraMuscular once  glucagon  Injectable 1 milliGRAM(s) IntraMuscular once  insulin regular Infusion 14 Unit(s)/Hr (14 mL/Hr) IV Continuous <Continuous>  midazolam Infusion 0.02 mG/kG/Hr (1.65 mL/Hr) IV Continuous <Continuous>  norepinephrine Infusion 0.05 MICROgram(s)/kG/Min (7.74 mL/Hr) IV Continuous <Continuous>  propofol Infusion 30 MICROgram(s)/kG/Min (15.5 mL/Hr) IV Continuous <Continuous>  remdesivir  IVPB   IV Intermittent   remdesivir  IVPB 100 milliGRAM(s) IV Intermittent every 24 hours  simvastatin 20 milliGRAM(s) Oral at bedtime  tamsulosin 0.4 milliGRAM(s) Oral at bedtime    MEDICATIONS  (PRN):  sodium chloride 0.9% lock flush 10 milliLiter(s) IV Push every 1 hour PRN Pre/post blood products, medications, blood draw, and to maintain line patency      ALLERGIES:  Allergies    No Known Allergies    Intolerances        LABS:                        12.9   12.02 )-----------( 291      ( 23 Jan 2021 04:45 )             42.6     01-23    135  |  95<L>  |  29<H>  ----------------------------<  504<HH>  4.2   |  15<L>  |  1.71<H>    Ca    9.1      23 Jan 2021 04:45  Phos  5.7     01-22  Mg     2.0     01-22    TPro  7.3  /  Alb  3.4  /  TBili  0.5  /  DBili  x   /  AST  38  /  ALT  15  /  AlkPhos  67  01-23          RADIOLOGY & ADDITIONAL TESTS: Reviewed. Almas Hebert pgy1  [id number]    INTERVAL HPI/OVERNIGHT EVENTS: The patient was admitted to the medical intensive care unit for management of severe acute respiratory distress syndrome secondary to covid-19-associated pneumonia. Overnight, he developed progressively rising blood glucose values, an elevated anion gap, and a metabolic acidosis consistent with a diagnosis of diabetic ketoacidosis.     SUBJECTIVE: Patient seen and examined at bedside.     Could not obtain accurate review of systems as the patient is sedated and intubated.     OBJECTIVE:    VITAL SIGNS:  ICU Vital Signs Last 24 Hrs  T(C): 36.6 (23 Jan 2021 04:00), Max: 37.3 (22 Jan 2021 17:49)  T(F): 97.9 (23 Jan 2021 04:00), Max: 99.1 (22 Jan 2021 17:49)  HR: 71 (23 Jan 2021 07:01) (64 - 119)  BP: 115/51 (23 Jan 2021 07:00) (94/49 - 210/86)  BP(mean): 67 (23 Jan 2021 07:00) (60 - 119)  ABP: 121/55 (23 Jan 2021 07:00) (79/43 - 198/87)  ABP(mean): 73 (23 Jan 2021 07:00) (56 - 126)  RR: 24 (23 Jan 2021 07:00) (16 - 37)  SpO2: 96% (23 Jan 2021 07:01) (85% - 99%)    Mode: AC/ CMV (Assist Control/ Continuous Mandatory Ventilation), RR (machine): 24, TV (machine): 450, FiO2: 80, PEEP: 12, ITime: 0.6, MAP: 18, PIP: 35    01-22 @ 07:01  -  01-23 @ 07:00  --------------------------------------------------------  IN: 1597.2 mL / OUT: 1455 mL / NET: 142.2 mL    01-23 @ 07:01 - 01-23 @ 07:11  --------------------------------------------------------  IN: 14 mL / OUT: 0 mL / NET: 14 mL      CAPILLARY BLOOD GLUCOSE      POCT Blood Glucose.: 389 mg/dL (23 Jan 2021 06:55)      PHYSICAL EXAM:    General: Sedated and intubated; not initiating own respirations   HEENT: PERRL grossly and about 3 mm bilaterally, clear conjunctiva  Neck: right internal jugular venous catheter in place; no lymphadenopathy   Respiratory: Equal chest rise bilaterally; tympanic to percussion; no crepitus   Cardiovascular: Point of maximal impulse not displaced; extremities warm and well perfused   Abdomen: soft, NT/ND; +BS x4  Extremities: WWP, 2+ peripheral pulses b/l; no LE edema  Skin: normal color and turgor; no rash  Neurological: RASS -5; pupils equal and round     MEDICATIONS:  MEDICATIONS  (STANDING):  aspirin  chewable 81 milliGRAM(s) Oral daily  chlorhexidine 0.12% Liquid 15 milliLiter(s) Oral Mucosa every 12 hours  chlorhexidine 4% Liquid 1 Application(s) Topical <User Schedule>  cisatracurium Infusion 3 MICROgram(s)/kG/Min (14.9 mL/Hr) IV Continuous <Continuous>  clopidogrel Tablet 75 milliGRAM(s) Oral daily  dexAMETHasone     Tablet 6 milliGRAM(s) Oral daily  enoxaparin Injectable 40 milliGRAM(s) SubCutaneous daily  fenofibrate Tablet 145 milliGRAM(s) Oral daily  fentaNYL   Infusion. 0.5 MICROgram(s)/kG/Hr (4.13 mL/Hr) IV Continuous <Continuous>  glucagon  Injectable 1 milliGRAM(s) IntraMuscular once  glucagon  Injectable 1 milliGRAM(s) IntraMuscular once  insulin regular Infusion 14 Unit(s)/Hr (14 mL/Hr) IV Continuous <Continuous>  midazolam Infusion 0.02 mG/kG/Hr (1.65 mL/Hr) IV Continuous <Continuous>  norepinephrine Infusion 0.05 MICROgram(s)/kG/Min (7.74 mL/Hr) IV Continuous <Continuous>  propofol Infusion 30 MICROgram(s)/kG/Min (15.5 mL/Hr) IV Continuous <Continuous>  remdesivir  IVPB   IV Intermittent   remdesivir  IVPB 100 milliGRAM(s) IV Intermittent every 24 hours  simvastatin 20 milliGRAM(s) Oral at bedtime  tamsulosin 0.4 milliGRAM(s) Oral at bedtime    MEDICATIONS  (PRN):  sodium chloride 0.9% lock flush 10 milliLiter(s) IV Push every 1 hour PRN Pre/post blood products, medications, blood draw, and to maintain line patency      ALLERGIES:  Allergies    No Known Allergies    Intolerances        LABS:                        12.9   12.02 )-----------( 291      ( 23 Jan 2021 04:45 )             42.6     01-23    135  |  95<L>  |  29<H>  ----------------------------<  504<HH>  4.2   |  15<L>  |  1.71<H>    Ca    9.1      23 Jan 2021 04:45  Phos  5.7     01-22  Mg     2.0     01-22    TPro  7.3  /  Alb  3.4  /  TBili  0.5  /  DBili  x   /  AST  38  /  ALT  15  /  AlkPhos  67  01-23          RADIOLOGY & ADDITIONAL TESTS: Reviewed.

## 2021-01-24 LAB
ALBUMIN SERPL ELPH-MCNC: 2.9 G/DL — LOW (ref 3.3–5)
ALP SERPL-CCNC: 58 U/L — SIGNIFICANT CHANGE UP (ref 40–120)
ALT FLD-CCNC: 12 U/L — SIGNIFICANT CHANGE UP (ref 4–41)
ANION GAP SERPL CALC-SCNC: 11 MMOL/L — SIGNIFICANT CHANGE UP (ref 7–14)
ANION GAP SERPL CALC-SCNC: 12 MMOL/L — SIGNIFICANT CHANGE UP (ref 7–14)
ANION GAP SERPL CALC-SCNC: 13 MMOL/L — SIGNIFICANT CHANGE UP (ref 7–14)
AST SERPL-CCNC: 24 U/L — SIGNIFICANT CHANGE UP (ref 4–40)
BASOPHILS # BLD AUTO: 0.01 K/UL — SIGNIFICANT CHANGE UP (ref 0–0.2)
BASOPHILS NFR BLD AUTO: 0.1 % — SIGNIFICANT CHANGE UP (ref 0–2)
BILIRUB SERPL-MCNC: 0.4 MG/DL — SIGNIFICANT CHANGE UP (ref 0.2–1.2)
BLOOD GAS ARTERIAL COMPREHENSIVE RESULT: SIGNIFICANT CHANGE UP
BUN SERPL-MCNC: 28 MG/DL — HIGH (ref 7–23)
BUN SERPL-MCNC: 30 MG/DL — HIGH (ref 7–23)
BUN SERPL-MCNC: 30 MG/DL — HIGH (ref 7–23)
CALCIUM SERPL-MCNC: 8.6 MG/DL — SIGNIFICANT CHANGE UP (ref 8.4–10.5)
CALCIUM SERPL-MCNC: 8.6 MG/DL — SIGNIFICANT CHANGE UP (ref 8.4–10.5)
CALCIUM SERPL-MCNC: 8.7 MG/DL — SIGNIFICANT CHANGE UP (ref 8.4–10.5)
CHLORIDE SERPL-SCNC: 105 MMOL/L — SIGNIFICANT CHANGE UP (ref 98–107)
CHLORIDE SERPL-SCNC: 105 MMOL/L — SIGNIFICANT CHANGE UP (ref 98–107)
CHLORIDE SERPL-SCNC: 106 MMOL/L — SIGNIFICANT CHANGE UP (ref 98–107)
CO2 SERPL-SCNC: 21 MMOL/L — LOW (ref 22–31)
CO2 SERPL-SCNC: 22 MMOL/L — SIGNIFICANT CHANGE UP (ref 22–31)
CO2 SERPL-SCNC: 22 MMOL/L — SIGNIFICANT CHANGE UP (ref 22–31)
CREAT SERPL-MCNC: 1.29 MG/DL — SIGNIFICANT CHANGE UP (ref 0.5–1.3)
CREAT SERPL-MCNC: 1.29 MG/DL — SIGNIFICANT CHANGE UP (ref 0.5–1.3)
CREAT SERPL-MCNC: 1.3 MG/DL — SIGNIFICANT CHANGE UP (ref 0.5–1.3)
EOSINOPHIL # BLD AUTO: 0 K/UL — SIGNIFICANT CHANGE UP (ref 0–0.5)
EOSINOPHIL NFR BLD AUTO: 0 % — SIGNIFICANT CHANGE UP (ref 0–6)
GLUCOSE BLDC GLUCOMTR-MCNC: 183 MG/DL — HIGH (ref 70–99)
GLUCOSE BLDC GLUCOMTR-MCNC: 198 MG/DL — HIGH (ref 70–99)
GLUCOSE BLDC GLUCOMTR-MCNC: 211 MG/DL — HIGH (ref 70–99)
GLUCOSE BLDC GLUCOMTR-MCNC: 218 MG/DL — HIGH (ref 70–99)
GLUCOSE BLDC GLUCOMTR-MCNC: 230 MG/DL — HIGH (ref 70–99)
GLUCOSE BLDC GLUCOMTR-MCNC: 251 MG/DL — HIGH (ref 70–99)
GLUCOSE BLDC GLUCOMTR-MCNC: 274 MG/DL — HIGH (ref 70–99)
GLUCOSE BLDC GLUCOMTR-MCNC: 286 MG/DL — HIGH (ref 70–99)
GLUCOSE BLDC GLUCOMTR-MCNC: 325 MG/DL — HIGH (ref 70–99)
GLUCOSE BLDC GLUCOMTR-MCNC: 353 MG/DL — HIGH (ref 70–99)
GLUCOSE BLDC GLUCOMTR-MCNC: 353 MG/DL — HIGH (ref 70–99)
GLUCOSE BLDC GLUCOMTR-MCNC: 367 MG/DL — HIGH (ref 70–99)
GLUCOSE BLDC GLUCOMTR-MCNC: 370 MG/DL — HIGH (ref 70–99)
GLUCOSE BLDC GLUCOMTR-MCNC: 382 MG/DL — HIGH (ref 70–99)
GLUCOSE BLDC GLUCOMTR-MCNC: 404 MG/DL — HIGH (ref 70–99)
GLUCOSE SERPL-MCNC: 306 MG/DL — HIGH (ref 70–99)
GLUCOSE SERPL-MCNC: 389 MG/DL — HIGH (ref 70–99)
GLUCOSE SERPL-MCNC: 390 MG/DL — HIGH (ref 70–99)
HCT VFR BLD CALC: 35.7 % — LOW (ref 39–50)
HGB BLD-MCNC: 11.5 G/DL — LOW (ref 13–17)
IANC: 11.06 K/UL — HIGH (ref 1.5–8.5)
IMM GRANULOCYTES NFR BLD AUTO: 0.5 % — SIGNIFICANT CHANGE UP (ref 0–1.5)
LYMPHOCYTES # BLD AUTO: 0.7 K/UL — LOW (ref 1–3.3)
LYMPHOCYTES # BLD AUTO: 5.5 % — LOW (ref 13–44)
MAGNESIUM SERPL-MCNC: 2.2 MG/DL — SIGNIFICANT CHANGE UP (ref 1.6–2.6)
MCHC RBC-ENTMCNC: 26.3 PG — LOW (ref 27–34)
MCHC RBC-ENTMCNC: 32.2 GM/DL — SIGNIFICANT CHANGE UP (ref 32–36)
MCV RBC AUTO: 81.5 FL — SIGNIFICANT CHANGE UP (ref 80–100)
MONOCYTES # BLD AUTO: 0.91 K/UL — HIGH (ref 0–0.9)
MONOCYTES NFR BLD AUTO: 7.1 % — SIGNIFICANT CHANGE UP (ref 2–14)
NEUTROPHILS # BLD AUTO: 11.06 K/UL — HIGH (ref 1.8–7.4)
NEUTROPHILS NFR BLD AUTO: 86.8 % — HIGH (ref 43–77)
NRBC # BLD: 0 /100 WBCS — SIGNIFICANT CHANGE UP
NRBC # FLD: 0 K/UL — SIGNIFICANT CHANGE UP
PHOSPHATE SERPL-MCNC: 2.5 MG/DL — SIGNIFICANT CHANGE UP (ref 2.5–4.5)
PLATELET # BLD AUTO: 265 K/UL — SIGNIFICANT CHANGE UP (ref 150–400)
POTASSIUM SERPL-MCNC: 4.4 MMOL/L — SIGNIFICANT CHANGE UP (ref 3.5–5.3)
POTASSIUM SERPL-MCNC: 4.6 MMOL/L — SIGNIFICANT CHANGE UP (ref 3.5–5.3)
POTASSIUM SERPL-MCNC: 4.6 MMOL/L — SIGNIFICANT CHANGE UP (ref 3.5–5.3)
POTASSIUM SERPL-SCNC: 4.4 MMOL/L — SIGNIFICANT CHANGE UP (ref 3.5–5.3)
POTASSIUM SERPL-SCNC: 4.6 MMOL/L — SIGNIFICANT CHANGE UP (ref 3.5–5.3)
POTASSIUM SERPL-SCNC: 4.6 MMOL/L — SIGNIFICANT CHANGE UP (ref 3.5–5.3)
PROT SERPL-MCNC: 6.1 G/DL — SIGNIFICANT CHANGE UP (ref 6–8.3)
RBC # BLD: 4.38 M/UL — SIGNIFICANT CHANGE UP (ref 4.2–5.8)
RBC # FLD: 14.7 % — HIGH (ref 10.3–14.5)
SODIUM SERPL-SCNC: 138 MMOL/L — SIGNIFICANT CHANGE UP (ref 135–145)
SODIUM SERPL-SCNC: 139 MMOL/L — SIGNIFICANT CHANGE UP (ref 135–145)
SODIUM SERPL-SCNC: 140 MMOL/L — SIGNIFICANT CHANGE UP (ref 135–145)
WBC # BLD: 12.74 K/UL — HIGH (ref 3.8–10.5)
WBC # FLD AUTO: 12.74 K/UL — HIGH (ref 3.8–10.5)

## 2021-01-24 PROCEDURE — 99291 CRITICAL CARE FIRST HOUR: CPT | Mod: 25

## 2021-01-24 PROCEDURE — 71045 X-RAY EXAM CHEST 1 VIEW: CPT | Mod: 26

## 2021-01-24 PROCEDURE — 76604 US EXAM CHEST: CPT | Mod: 26

## 2021-01-24 PROCEDURE — 93308 TTE F-UP OR LMTD: CPT | Mod: 26

## 2021-01-24 RX ORDER — HUMAN INSULIN 100 [IU]/ML
20 INJECTION, SUSPENSION SUBCUTANEOUS EVERY 6 HOURS
Refills: 0 | Status: DISCONTINUED | OUTPATIENT
Start: 2021-01-24 | End: 2021-01-24

## 2021-01-24 RX ORDER — FUROSEMIDE 40 MG
20 TABLET ORAL ONCE
Refills: 0 | Status: COMPLETED | OUTPATIENT
Start: 2021-01-24 | End: 2021-01-24

## 2021-01-24 RX ORDER — INSULIN HUMAN 100 [IU]/ML
7 INJECTION, SOLUTION SUBCUTANEOUS
Qty: 100 | Refills: 0 | Status: DISCONTINUED | OUTPATIENT
Start: 2021-01-24 | End: 2021-01-26

## 2021-01-24 RX ORDER — HUMAN INSULIN 100 [IU]/ML
20 INJECTION, SUSPENSION SUBCUTANEOUS ONCE
Refills: 0 | Status: COMPLETED | OUTPATIENT
Start: 2021-01-24 | End: 2021-01-24

## 2021-01-24 RX ORDER — SENNA PLUS 8.6 MG/1
20 TABLET ORAL
Refills: 0 | Status: DISCONTINUED | OUTPATIENT
Start: 2021-01-24 | End: 2021-01-24

## 2021-01-24 RX ORDER — FENTANYL CITRATE 50 UG/ML
1.5 INJECTION INTRAVENOUS
Qty: 2500 | Refills: 0 | Status: DISCONTINUED | OUTPATIENT
Start: 2021-01-24 | End: 2021-01-25

## 2021-01-24 RX ORDER — SENNA PLUS 8.6 MG/1
10 TABLET ORAL
Refills: 0 | Status: DISCONTINUED | OUTPATIENT
Start: 2021-01-24 | End: 2021-01-24

## 2021-01-24 RX ORDER — SENNA PLUS 8.6 MG/1
10 TABLET ORAL
Refills: 0 | Status: DISCONTINUED | OUTPATIENT
Start: 2021-01-24 | End: 2021-01-25

## 2021-01-24 RX ORDER — NOREPINEPHRINE BITARTRATE/D5W 8 MG/250ML
0.05 PLASTIC BAG, INJECTION (ML) INTRAVENOUS
Qty: 8 | Refills: 0 | Status: DISCONTINUED | OUTPATIENT
Start: 2021-01-24 | End: 2021-01-26

## 2021-01-24 RX ADMIN — CHLORHEXIDINE GLUCONATE 1 APPLICATION(S): 213 SOLUTION TOPICAL at 06:22

## 2021-01-24 RX ADMIN — Medication 10: at 06:58

## 2021-01-24 RX ADMIN — CHLORHEXIDINE GLUCONATE 15 MILLILITER(S): 213 SOLUTION TOPICAL at 05:59

## 2021-01-24 RX ADMIN — PROPOFOL 15.5 MICROGRAM(S)/KG/MIN: 10 INJECTION, EMULSION INTRAVENOUS at 03:12

## 2021-01-24 RX ADMIN — Medication 20 MILLIGRAM(S): at 11:23

## 2021-01-24 RX ADMIN — CISATRACURIUM BESYLATE 14.9 MICROGRAM(S)/KG/MIN: 2 INJECTION INTRAVENOUS at 11:23

## 2021-01-24 RX ADMIN — INSULIN HUMAN 7 UNIT(S)/HR: 100 INJECTION, SOLUTION SUBCUTANEOUS at 11:23

## 2021-01-24 RX ADMIN — CLOPIDOGREL BISULFATE 75 MILLIGRAM(S): 75 TABLET, FILM COATED ORAL at 11:25

## 2021-01-24 RX ADMIN — CISATRACURIUM BESYLATE 14.9 MICROGRAM(S)/KG/MIN: 2 INJECTION INTRAVENOUS at 21:32

## 2021-01-24 RX ADMIN — MIDAZOLAM HYDROCHLORIDE 1.65 MG/KG/HR: 1 INJECTION, SOLUTION INTRAMUSCULAR; INTRAVENOUS at 11:24

## 2021-01-24 RX ADMIN — REMDESIVIR 500 MILLIGRAM(S): 5 INJECTION INTRAVENOUS at 21:29

## 2021-01-24 RX ADMIN — Medication 145 MILLIGRAM(S): at 11:26

## 2021-01-24 RX ADMIN — POLYETHYLENE GLYCOL 3350 17 GRAM(S): 17 POWDER, FOR SOLUTION ORAL at 11:24

## 2021-01-24 RX ADMIN — CHLORHEXIDINE GLUCONATE 15 MILLILITER(S): 213 SOLUTION TOPICAL at 18:08

## 2021-01-24 RX ADMIN — ENOXAPARIN SODIUM 40 MILLIGRAM(S): 100 INJECTION SUBCUTANEOUS at 11:25

## 2021-01-24 RX ADMIN — CISATRACURIUM BESYLATE 14.9 MICROGRAM(S)/KG/MIN: 2 INJECTION INTRAVENOUS at 03:12

## 2021-01-24 RX ADMIN — INSULIN HUMAN 7 UNIT(S)/HR: 100 INJECTION, SOLUTION SUBCUTANEOUS at 21:32

## 2021-01-24 RX ADMIN — FENTANYL CITRATE 2.48 MICROGRAM(S)/KG/HR: 50 INJECTION INTRAVENOUS at 11:24

## 2021-01-24 RX ADMIN — PROPOFOL 15.5 MICROGRAM(S)/KG/MIN: 10 INJECTION, EMULSION INTRAVENOUS at 21:34

## 2021-01-24 RX ADMIN — PROPOFOL 15.5 MICROGRAM(S)/KG/MIN: 10 INJECTION, EMULSION INTRAVENOUS at 11:23

## 2021-01-24 RX ADMIN — SIMVASTATIN 20 MILLIGRAM(S): 20 TABLET, FILM COATED ORAL at 21:29

## 2021-01-24 RX ADMIN — MIDAZOLAM HYDROCHLORIDE 1.65 MG/KG/HR: 1 INJECTION, SOLUTION INTRAMUSCULAR; INTRAVENOUS at 03:12

## 2021-01-24 RX ADMIN — HUMAN INSULIN 20 UNIT(S): 100 INJECTION, SUSPENSION SUBCUTANEOUS at 06:00

## 2021-01-24 RX ADMIN — FENTANYL CITRATE 2.48 MICROGRAM(S)/KG/HR: 50 INJECTION INTRAVENOUS at 21:33

## 2021-01-24 RX ADMIN — MIDAZOLAM HYDROCHLORIDE 1.65 MG/KG/HR: 1 INJECTION, SOLUTION INTRAMUSCULAR; INTRAVENOUS at 21:34

## 2021-01-24 RX ADMIN — Medication 81 MILLIGRAM(S): at 11:25

## 2021-01-24 RX ADMIN — Medication 7.74 MICROGRAM(S)/KG/MIN: at 21:32

## 2021-01-24 RX ADMIN — Medication 6 MILLIGRAM(S): at 05:59

## 2021-01-24 RX ADMIN — TAMSULOSIN HYDROCHLORIDE 0.4 MILLIGRAM(S): 0.4 CAPSULE ORAL at 21:29

## 2021-01-24 RX ADMIN — SENNA PLUS 10 MILLILITER(S): 8.6 TABLET ORAL at 18:08

## 2021-01-24 NOTE — PROGRESS NOTE ADULT - ATTENDING COMMENTS
71 y/o M with PMH as above here with acute hypoxic respiratory secondary to COVID-19 viral pneumonia.  Patient was intubated in the ER and admitted to the MICU for ARDS.  Patient set to 6 cc/kg tidal volume setting per ARDSnet, stress index noted to be 1. AM ABG was 7.46/32/68 on an FiO2 of 50%.  Cont to trend serial ABGs.  GDE shows a thick RV but no e/o dilatation.  Patient sedated and paralyzed. Cont remdesivir and decadron.  OLGA, will cont to trend BMPs.  AGMA was noted related to DKA.  AG closed now. Prognosis guarded.

## 2021-01-24 NOTE — PROGRESS NOTE ADULT - ASSESSMENT
70-year-old man, PMHx of HTN, HLD, CAD, who presented to the emergency department with acute respiratory failure with hypoxia secondary to covid-19-associated pneumonia whose condition has progressed to severe acute respiratory distress syndrome requiring endotracheal intubation and management on a mechanical ventilator.     NEUROLOGIC: -The patient is currently sedated with propofol, midazolam, fentanyl; neuromuscular blockade with cisatracurium; rass -5 at this time; will target rass -5 to allow for complete assist control and lung-protective strategy  -Will consider transition to prone positioning      CARDIOVASCULAR: -Vasoplegic shock state requiring administration of norepinephrine to maintain map greater than 65; shock state vasoplegic in the setting of administration of sedative medications   -Unspecified history of coronary artery disease; administering aspirin and plavix; no history of arrhythmia; sinus rhythm on telemetry     RESPIRATORY: -Acute hypoxic respiratory failure secondary to covid-19, which has progressed to severe acute respiratory distress syndrome; p:f ratio about 120 on today's blood gas; atelectasis noted at bilateral lung bases on pocus; will consider proning if p:f ratio does not improve; estimated pulmonary compliance 31 mL/mm Hg at this time   -High anion gap metabolic acidosis in the setting of diabetic ketoacidosis; appropriate respiratory compensation at this time   -Current ventilator settings--AC/CMV, Tv 440, peep 12, rr 28, fio2 70    INFECTIOUS DISEASE: -COVID-19 pneumonia; administering dexamethasone 6 mg daily for ten-day course or until end of hospitalization and remdesivir 200 mg today followed by 100 mg daily for the next four days (day two of ten and five, respectively, today)   -Serum procalcitonin marginally elevated but no focal consolidation on chest xray and no other focal signs of acute bacterial infection; will manage off of antibiotics at this time; will assess d dimer, ldh, crp, ferritin every 48 hours and consider antibiotic therapy if brisk elevation     GI: -Will place orogastric tube today and administer glucerna tube feeds   -Will monitor bowel movements     Urinary tract: -Oswald catheter placed; will monitor strict intake and output and assess metabolic panel daily  -Serum creatinine elevation noted; most likely represents pre-renal azotemia in setting of hypotension from medication administration and clinical sepsis; managing by supporting blood pressure as above   -High anion gap metabolic acidosis secondary to diabetic ketoacidosis; respiratory compensation appropriate; will decrease tidal volume to decrease minute ventilation and allow for some permissive hypercapnia per ardsnet protocol     Endo: -History of type II diabetes mellitus noted; clinically in diabetic ketoacidosis given elevated elevated blood glucose to greater than 200, bicarbonate less than 18, and venous pH less than 7.25; managing at this time with continuous intravenous infusion of insulin; will draw q4h bmp and q1h fingerstick glucose     HEMATOLOGIC: -DVT prophylaxis with lovenox 40 subcutaneous daily; no anemia, leukocytosis, leukopenia, or thrombocytopenia     SKIN: -No known decubiti at the time of admission   -Right radial arterial line and right internal jugular venous catheter in place  70-year-old man, PMHx of HTN, HLD, CAD, who presented to the emergency department with acute respiratory failure with hypoxia secondary to covid-19-associated pneumonia whose condition has progressed to severe acute respiratory distress syndrome requiring endotracheal intubation and management on a mechanical ventilator.     NEUROLOGIC: -The patient is currently sedated with propofol, midazolam, fentanyl; neuromuscular blockade with cisatracurium; rass -5 at this time; will target rass -5 to allow for complete assist control and lung-protective strategy    CARDIOVASCULAR: -Vasoplegic shock state requiring administration of norepinephrine to maintain map greater than 65; shock state vasoplegic in the setting of administration of sedative medications   -Unspecified history of coronary artery disease; administering aspirin and plavix; no history of arrhythmia; sinus rhythm on telemetry     RESPIRATORY: -Acute hypoxic respiratory failure secondary to covid-19, which has progressed to severe acute respiratory distress syndrome; ; atelectasis noted at bilateral lung bases on pocus; will consider proning if p:f ratio worsens, currently stable/improving, will hold off proning, c/w paralytics to help lung mechanics; estimated pulmonary compliance 31 mL/mm Hg at this time . -ET tube with suspected airleak, will confirm ET placement w/ chest x-ray       INFECTIOUS DISEASE: -COVID-19 pneumonia; c/w dexamethasone 6 mg daily for ten-day course, remdesivir 200 mg today followed by 100 mg daily for the next four days (day two of ten and five, respectively, today)   -Serum procalcitonin marginally elevated but no focal consolidation on chest xray and no other focal signs of acute bacterial infection; will manage off of antibiotics at this time; will assess d dimer, ldh, crp, ferritin every 48 hours and consider antibiotic therapy if brisk elevation     GI: -Will place orogastric tube today and administer glucerna tube feeds   -Patient w/o BMs, increase bowel regimen from daily to bid.     Renal: -Oswald catheter placed; will monitor strict intake and output and assess metabolic panel daily. 1/24: patient 1.65L net positive, will admin 20mg IV lasix, monitor response.       Endo: -History of type II diabetes mellitus noted; clinically in diabetic ketoacidosis given elevated elevated blood glucose to greater than 200, bicarbonate less than 18, and venous pH less than 7.25; now resolved s/p insulin gtt. Patient transitioned to NPH, however FS in 300s-400s. Will place back on insulin gtt (patient well controlled on home regimen of 68U basaglar, Novolog 15tid, insulin requirements likely even higher in setting of steroid use); will draw q4h bmp and q1h fingerstick glucose     HEMATOLOGIC: -DVT prophylaxis with lovenox 40 subcutaneous daily; no anemia, leukocytosis, leukopenia, or thrombocytopenia     SKIN: -No known decubiti at the time of admission   -Right radial arterial line and right internal jugular venous catheter in place  70-year-old man, PMHx of HTN, HLD, CAD, who presented to the emergency department with acute respiratory failure with hypoxia secondary to covid-19-associated pneumonia whose condition has progressed to severe acute respiratory distress syndrome requiring endotracheal intubation and management on a mechanical ventilator.     NEUROLOGIC:   -sedated with propofol, midazolam, fentanyl;   -c/w paralysis on nimbex, for complete assist control and lung-protective strategy    CARDIOVASCULAR:   -Vasoplegic shock state 2/2 sedative medications, c/w levophed (MAP >65)   -CAD s/p stents in 2015?; c/w aspirin and plavix    RESPIRATORY:   -Acute hypoxic respiratory failure 2/2 COVID, progressed to severe ARDs; atelectasis noted at bilateral lung bases on pocus; will consider proning if P/F atio worsens, currently stable/improving, will hold off proning, c/w paralytics to help lung mechanics   -ET tube with suspected airleak 1/14, will confirm ET placement w/ chest x-ray       INFECTIOUS DISEASE:   -COVID-19 pneumonia; c/w dexamethasone for ten-day course (1/22-), remdesivir (1/22-) for 5 day course  -procalcitonin marginally elevated, no focal consolidation on chest xray, no other focal signs of acute bacterial infection; will manage off of antibiotics at this time; will assess d dimer, ldh, crp, ferritin every 48 hours and consider antibiotic therapy if brisk elevation     GI:   -OG tube in place, c/w tube feeds  -Patient w/o BMs, increase bowel regimen from daily to bid.     Renal:  -Oswald catheter placed; monitor Is&Os, monitor daily BMPs.  - 1/24: patient 1.65L net positive, will admin 20mg IV lasix, monitor response, goal to maintain net negative      Endo:   -DMII A1c 9.6%, reportedly well controlled at home on 68U basaglar, Novolog 15tid.   -On admission, noted to be in DKA given glucose >200, bicarbonate <18, and pH <7.25; now resolved s/p insulin gtt. Patient transitioned to NPH however FS in 300s-400s. Will place back on insulin gtt (patient requires high insulin doses at home, requirements increasing even more in setting of steroid use); will draw q4h bmp and q1h fingerstick glucose     HEMATOLOGIC:   -DVT prophylaxis with lovenox 40 subcutaneous daily; no anemia, leukocytosis, leukopenia, or thrombocytopenia     SKIN:   -No known decubiti at the time of admission   -Right radial arterial line and right internal jugular venous catheter in place     ETHICS:  Full code  Contact wife for updates

## 2021-01-24 NOTE — CHART NOTE - NSCHARTNOTEFT_GEN_A_CORE
: Warren Lerma MD    INDICATION: COVID PNA, ARDS, hypoxemic respiratory failure    PROCEDURE:  [ x] LIMITED ECHO  [x] LIMITED CHEST  [ ] LIMITED RETROPERITONEAL  [ ] LIMITED ABDOMINAL  [ ] LIMITED DVT  [ ] NEEDLE GUIDANCE VASCULAR  [ ] NEEDLE GUIDANCE THORACENTESIS  [ ] NEEDLE GUIDANCE PARACENTESIS  [ ] NEEDLE GUIDANCE PERICARDIOCENTESIS  [ ] OTHER    FINDINGS:  Chest: multifocal B lines bilaterally anteriorly.Bibasilar consolidations  Echo: Normal LV function, RV<LV, IVC indeterminate    INTERPRETATION:  ARDS, no cardiac limitation      Images stored in [facility]      Warren Lerma MD, PGY6  Pulmonary and Critical Care Fellow  [phone] : Warren Lerma MD    INDICATION: COVID PNA, ARDS, hypoxemic respiratory failure    PROCEDURE:  [ x] LIMITED ECHO  [x] LIMITED CHEST  [ ] LIMITED RETROPERITONEAL  [ ] LIMITED ABDOMINAL  [ ] LIMITED DVT  [ ] NEEDLE GUIDANCE VASCULAR  [ ] NEEDLE GUIDANCE THORACENTESIS  [ ] NEEDLE GUIDANCE PARACENTESIS  [ ] NEEDLE GUIDANCE PERICARDIOCENTESIS  [ ] OTHER    FINDINGS:  Chest: multifocal B lines bilaterally anteriorly.Bibasilar consolidations  Echo: Normal LV function, RV<LV, IVC indeterminate    INTERPRETATION:  ARDS, no cardiac limitation    Warren Lerma MD, PGY6  Pulmonary and Critical Care Fellow  [phone]    Images uploaded on [facility]    Attending Attestation:  I was present during the key portions of the procedure and immediately available during the entire procedure.  Cal Quispe DO  Attending  Pulmonary & Critical Care Medicine

## 2021-01-25 LAB
-  AMPICILLIN/SULBACTAM: SIGNIFICANT CHANGE UP
-  CEFAZOLIN: SIGNIFICANT CHANGE UP
-  CLINDAMYCIN: SIGNIFICANT CHANGE UP
-  ERYTHROMYCIN: SIGNIFICANT CHANGE UP
-  GENTAMICIN: SIGNIFICANT CHANGE UP
-  OXACILLIN: SIGNIFICANT CHANGE UP
-  PENICILLIN: SIGNIFICANT CHANGE UP
-  RIFAMPIN: SIGNIFICANT CHANGE UP
-  TETRACYCLINE: SIGNIFICANT CHANGE UP
-  TRIMETHOPRIM/SULFAMETHOXAZOLE: SIGNIFICANT CHANGE UP
-  VANCOMYCIN: SIGNIFICANT CHANGE UP
ALBUMIN SERPL ELPH-MCNC: 2.6 G/DL — LOW (ref 3.3–5)
ALP SERPL-CCNC: 63 U/L — SIGNIFICANT CHANGE UP (ref 40–120)
ALT FLD-CCNC: 8 U/L — SIGNIFICANT CHANGE UP (ref 4–41)
ANION GAP SERPL CALC-SCNC: 10 MMOL/L — SIGNIFICANT CHANGE UP (ref 7–14)
ANION GAP SERPL CALC-SCNC: 11 MMOL/L — SIGNIFICANT CHANGE UP (ref 7–14)
ANION GAP SERPL CALC-SCNC: 11 MMOL/L — SIGNIFICANT CHANGE UP (ref 7–14)
AST SERPL-CCNC: 17 U/L — SIGNIFICANT CHANGE UP (ref 4–40)
BASOPHILS # BLD AUTO: 0 K/UL — SIGNIFICANT CHANGE UP (ref 0–0.2)
BASOPHILS NFR BLD AUTO: 0 % — SIGNIFICANT CHANGE UP (ref 0–2)
BILIRUB SERPL-MCNC: 0.4 MG/DL — SIGNIFICANT CHANGE UP (ref 0.2–1.2)
BLOOD GAS ARTERIAL COMPREHENSIVE RESULT: SIGNIFICANT CHANGE UP
BLOOD GAS VENOUS COMPREHENSIVE RESULT: SIGNIFICANT CHANGE UP
BUN SERPL-MCNC: 36 MG/DL — HIGH (ref 7–23)
BUN SERPL-MCNC: 38 MG/DL — HIGH (ref 7–23)
BUN SERPL-MCNC: 41 MG/DL — HIGH (ref 7–23)
CALCIUM SERPL-MCNC: 8.1 MG/DL — LOW (ref 8.4–10.5)
CALCIUM SERPL-MCNC: 8.2 MG/DL — LOW (ref 8.4–10.5)
CALCIUM SERPL-MCNC: 8.7 MG/DL — SIGNIFICANT CHANGE UP (ref 8.4–10.5)
CHLORIDE SERPL-SCNC: 105 MMOL/L — SIGNIFICANT CHANGE UP (ref 98–107)
CHLORIDE SERPL-SCNC: 107 MMOL/L — SIGNIFICANT CHANGE UP (ref 98–107)
CHLORIDE SERPL-SCNC: 110 MMOL/L — HIGH (ref 98–107)
CO2 SERPL-SCNC: 22 MMOL/L — SIGNIFICANT CHANGE UP (ref 22–31)
CO2 SERPL-SCNC: 23 MMOL/L — SIGNIFICANT CHANGE UP (ref 22–31)
CO2 SERPL-SCNC: 24 MMOL/L — SIGNIFICANT CHANGE UP (ref 22–31)
CREAT SERPL-MCNC: 1.06 MG/DL — SIGNIFICANT CHANGE UP (ref 0.5–1.3)
CREAT SERPL-MCNC: 1.1 MG/DL — SIGNIFICANT CHANGE UP (ref 0.5–1.3)
CREAT SERPL-MCNC: 1.26 MG/DL — SIGNIFICANT CHANGE UP (ref 0.5–1.3)
CULTURE RESULTS: SIGNIFICANT CHANGE UP
EOSINOPHIL # BLD AUTO: 0 K/UL — SIGNIFICANT CHANGE UP (ref 0–0.5)
EOSINOPHIL NFR BLD AUTO: 0 % — SIGNIFICANT CHANGE UP (ref 0–6)
GLUCOSE BLDC GLUCOMTR-MCNC: 151 MG/DL — HIGH (ref 70–99)
GLUCOSE BLDC GLUCOMTR-MCNC: 161 MG/DL — HIGH (ref 70–99)
GLUCOSE BLDC GLUCOMTR-MCNC: 163 MG/DL — HIGH (ref 70–99)
GLUCOSE BLDC GLUCOMTR-MCNC: 163 MG/DL — HIGH (ref 70–99)
GLUCOSE BLDC GLUCOMTR-MCNC: 165 MG/DL — HIGH (ref 70–99)
GLUCOSE BLDC GLUCOMTR-MCNC: 165 MG/DL — HIGH (ref 70–99)
GLUCOSE BLDC GLUCOMTR-MCNC: 166 MG/DL — HIGH (ref 70–99)
GLUCOSE BLDC GLUCOMTR-MCNC: 171 MG/DL — HIGH (ref 70–99)
GLUCOSE BLDC GLUCOMTR-MCNC: 173 MG/DL — HIGH (ref 70–99)
GLUCOSE BLDC GLUCOMTR-MCNC: 177 MG/DL — HIGH (ref 70–99)
GLUCOSE BLDC GLUCOMTR-MCNC: 179 MG/DL — HIGH (ref 70–99)
GLUCOSE BLDC GLUCOMTR-MCNC: 179 MG/DL — HIGH (ref 70–99)
GLUCOSE BLDC GLUCOMTR-MCNC: 190 MG/DL — HIGH (ref 70–99)
GLUCOSE BLDC GLUCOMTR-MCNC: 190 MG/DL — HIGH (ref 70–99)
GLUCOSE BLDC GLUCOMTR-MCNC: 191 MG/DL — HIGH (ref 70–99)
GLUCOSE BLDC GLUCOMTR-MCNC: 199 MG/DL — HIGH (ref 70–99)
GLUCOSE BLDC GLUCOMTR-MCNC: 209 MG/DL — HIGH (ref 70–99)
GLUCOSE BLDC GLUCOMTR-MCNC: 213 MG/DL — HIGH (ref 70–99)
GLUCOSE BLDC GLUCOMTR-MCNC: 220 MG/DL — HIGH (ref 70–99)
GLUCOSE BLDC GLUCOMTR-MCNC: 238 MG/DL — HIGH (ref 70–99)
GLUCOSE BLDC GLUCOMTR-MCNC: 244 MG/DL — HIGH (ref 70–99)
GLUCOSE BLDC GLUCOMTR-MCNC: 254 MG/DL — HIGH (ref 70–99)
GLUCOSE BLDC GLUCOMTR-MCNC: 259 MG/DL — HIGH (ref 70–99)
GLUCOSE SERPL-MCNC: 170 MG/DL — HIGH (ref 70–99)
GLUCOSE SERPL-MCNC: 203 MG/DL — HIGH (ref 70–99)
GLUCOSE SERPL-MCNC: 247 MG/DL — HIGH (ref 70–99)
HCT VFR BLD CALC: 37.2 % — LOW (ref 39–50)
HGB BLD-MCNC: 11.5 G/DL — LOW (ref 13–17)
IANC: 10.57 K/UL — HIGH (ref 1.5–8.5)
LYMPHOCYTES # BLD AUTO: 0.86 K/UL — LOW (ref 1–3.3)
LYMPHOCYTES # BLD AUTO: 6.4 % — LOW (ref 13–44)
MAGNESIUM SERPL-MCNC: 2.2 MG/DL — SIGNIFICANT CHANGE UP (ref 1.6–2.6)
MAGNESIUM SERPL-MCNC: 2.2 MG/DL — SIGNIFICANT CHANGE UP (ref 1.6–2.6)
MAGNESIUM SERPL-MCNC: 2.6 MG/DL — SIGNIFICANT CHANGE UP (ref 1.6–2.6)
MCHC RBC-ENTMCNC: 25.8 PG — LOW (ref 27–34)
MCHC RBC-ENTMCNC: 30.9 GM/DL — LOW (ref 32–36)
MCV RBC AUTO: 83.4 FL — SIGNIFICANT CHANGE UP (ref 80–100)
METHOD TYPE: SIGNIFICANT CHANGE UP
MONOCYTES # BLD AUTO: 0.86 K/UL — SIGNIFICANT CHANGE UP (ref 0–0.9)
MONOCYTES NFR BLD AUTO: 6.4 % — SIGNIFICANT CHANGE UP (ref 2–14)
NEUTROPHILS # BLD AUTO: 11.57 K/UL — HIGH (ref 1.8–7.4)
NEUTROPHILS NFR BLD AUTO: 84.5 % — HIGH (ref 43–77)
ORGANISM # SPEC MICROSCOPIC CNT: SIGNIFICANT CHANGE UP
ORGANISM # SPEC MICROSCOPIC CNT: SIGNIFICANT CHANGE UP
PHOSPHATE SERPL-MCNC: 2.9 MG/DL — SIGNIFICANT CHANGE UP (ref 2.5–4.5)
PHOSPHATE SERPL-MCNC: 3.3 MG/DL — SIGNIFICANT CHANGE UP (ref 2.5–4.5)
PHOSPHATE SERPL-MCNC: 3.6 MG/DL — SIGNIFICANT CHANGE UP (ref 2.5–4.5)
PLATELET # BLD AUTO: 322 K/UL — SIGNIFICANT CHANGE UP (ref 150–400)
POTASSIUM SERPL-MCNC: 3.9 MMOL/L — SIGNIFICANT CHANGE UP (ref 3.5–5.3)
POTASSIUM SERPL-MCNC: 4 MMOL/L — SIGNIFICANT CHANGE UP (ref 3.5–5.3)
POTASSIUM SERPL-MCNC: 4.9 MMOL/L — SIGNIFICANT CHANGE UP (ref 3.5–5.3)
POTASSIUM SERPL-SCNC: 3.9 MMOL/L — SIGNIFICANT CHANGE UP (ref 3.5–5.3)
POTASSIUM SERPL-SCNC: 4 MMOL/L — SIGNIFICANT CHANGE UP (ref 3.5–5.3)
POTASSIUM SERPL-SCNC: 4.9 MMOL/L — SIGNIFICANT CHANGE UP (ref 3.5–5.3)
PROT SERPL-MCNC: 6.1 G/DL — SIGNIFICANT CHANGE UP (ref 6–8.3)
RBC # BLD: 4.46 M/UL — SIGNIFICANT CHANGE UP (ref 4.2–5.8)
RBC # FLD: 14.8 % — HIGH (ref 10.3–14.5)
SODIUM SERPL-SCNC: 139 MMOL/L — SIGNIFICANT CHANGE UP (ref 135–145)
SODIUM SERPL-SCNC: 141 MMOL/L — SIGNIFICANT CHANGE UP (ref 135–145)
SODIUM SERPL-SCNC: 143 MMOL/L — SIGNIFICANT CHANGE UP (ref 135–145)
SPECIMEN SOURCE: SIGNIFICANT CHANGE UP
WBC # BLD: 13.41 K/UL — HIGH (ref 3.8–10.5)
WBC # FLD AUTO: 13.41 K/UL — HIGH (ref 3.8–10.5)

## 2021-01-25 PROCEDURE — 99291 CRITICAL CARE FIRST HOUR: CPT

## 2021-01-25 RX ORDER — MIDAZOLAM HYDROCHLORIDE 1 MG/ML
0.02 INJECTION, SOLUTION INTRAMUSCULAR; INTRAVENOUS
Qty: 100 | Refills: 0 | Status: DISCONTINUED | OUTPATIENT
Start: 2021-01-25 | End: 2021-01-29

## 2021-01-25 RX ORDER — CEFTRIAXONE 500 MG/1
1000 INJECTION, POWDER, FOR SOLUTION INTRAMUSCULAR; INTRAVENOUS EVERY 24 HOURS
Refills: 0 | Status: DISCONTINUED | OUTPATIENT
Start: 2021-01-25 | End: 2021-01-26

## 2021-01-25 RX ORDER — PROPOFOL 10 MG/ML
31.27 INJECTION, EMULSION INTRAVENOUS
Qty: 1000 | Refills: 0 | Status: DISCONTINUED | OUTPATIENT
Start: 2021-01-25 | End: 2021-01-31

## 2021-01-25 RX ORDER — CEFAZOLIN SODIUM 1 G
2000 VIAL (EA) INJECTION EVERY 8 HOURS
Refills: 0 | Status: DISCONTINUED | OUTPATIENT
Start: 2021-01-25 | End: 2021-01-25

## 2021-01-25 RX ORDER — POLYETHYLENE GLYCOL 3350 17 G/17G
17 POWDER, FOR SOLUTION ORAL
Refills: 0 | Status: DISCONTINUED | OUTPATIENT
Start: 2021-01-25 | End: 2021-01-29

## 2021-01-25 RX ORDER — SENNA PLUS 8.6 MG/1
10 TABLET ORAL
Refills: 0 | Status: DISCONTINUED | OUTPATIENT
Start: 2021-01-25 | End: 2021-01-29

## 2021-01-25 RX ORDER — FENTANYL CITRATE 50 UG/ML
1.5 INJECTION INTRAVENOUS
Qty: 2500 | Refills: 0 | Status: DISCONTINUED | OUTPATIENT
Start: 2021-01-25 | End: 2021-01-31

## 2021-01-25 RX ORDER — VANCOMYCIN HCL 1 G
1250 VIAL (EA) INTRAVENOUS EVERY 12 HOURS
Refills: 0 | Status: DISCONTINUED | OUTPATIENT
Start: 2021-01-25 | End: 2021-01-25

## 2021-01-25 RX ADMIN — CISATRACURIUM BESYLATE 14.9 MICROGRAM(S)/KG/MIN: 2 INJECTION INTRAVENOUS at 11:40

## 2021-01-25 RX ADMIN — FENTANYL CITRATE 2.48 MICROGRAM(S)/KG/HR: 50 INJECTION INTRAVENOUS at 20:26

## 2021-01-25 RX ADMIN — FENTANYL CITRATE 2.48 MICROGRAM(S)/KG/HR: 50 INJECTION INTRAVENOUS at 07:53

## 2021-01-25 RX ADMIN — Medication 166.67 MILLIGRAM(S): at 11:01

## 2021-01-25 RX ADMIN — CHLORHEXIDINE GLUCONATE 15 MILLILITER(S): 213 SOLUTION TOPICAL at 05:03

## 2021-01-25 RX ADMIN — PROPOFOL 15.5 MICROGRAM(S)/KG/MIN: 10 INJECTION, EMULSION INTRAVENOUS at 11:41

## 2021-01-25 RX ADMIN — Medication 6 MILLIGRAM(S): at 05:02

## 2021-01-25 RX ADMIN — CISATRACURIUM BESYLATE 14.9 MICROGRAM(S)/KG/MIN: 2 INJECTION INTRAVENOUS at 20:25

## 2021-01-25 RX ADMIN — CHLORHEXIDINE GLUCONATE 1 APPLICATION(S): 213 SOLUTION TOPICAL at 05:03

## 2021-01-25 RX ADMIN — MIDAZOLAM HYDROCHLORIDE 1.65 MG/KG/HR: 1 INJECTION, SOLUTION INTRAMUSCULAR; INTRAVENOUS at 20:27

## 2021-01-25 RX ADMIN — ENOXAPARIN SODIUM 40 MILLIGRAM(S): 100 INJECTION SUBCUTANEOUS at 11:02

## 2021-01-25 RX ADMIN — POLYETHYLENE GLYCOL 3350 17 GRAM(S): 17 POWDER, FOR SOLUTION ORAL at 11:38

## 2021-01-25 RX ADMIN — POLYETHYLENE GLYCOL 3350 17 GRAM(S): 17 POWDER, FOR SOLUTION ORAL at 17:58

## 2021-01-25 RX ADMIN — SENNA PLUS 10 MILLILITER(S): 8.6 TABLET ORAL at 05:03

## 2021-01-25 RX ADMIN — PROPOFOL 15.5 MICROGRAM(S)/KG/MIN: 10 INJECTION, EMULSION INTRAVENOUS at 17:58

## 2021-01-25 RX ADMIN — FENTANYL CITRATE 2.48 MICROGRAM(S)/KG/HR: 50 INJECTION INTRAVENOUS at 11:41

## 2021-01-25 RX ADMIN — Medication 145 MILLIGRAM(S): at 11:38

## 2021-01-25 RX ADMIN — FENTANYL CITRATE 2.48 MICROGRAM(S)/KG/HR: 50 INJECTION INTRAVENOUS at 15:16

## 2021-01-25 RX ADMIN — Medication 81 MILLIGRAM(S): at 11:39

## 2021-01-25 RX ADMIN — CISATRACURIUM BESYLATE 14.9 MICROGRAM(S)/KG/MIN: 2 INJECTION INTRAVENOUS at 09:00

## 2021-01-25 RX ADMIN — SENNA PLUS 15 MILLILITER(S): 8.6 TABLET ORAL at 17:59

## 2021-01-25 RX ADMIN — CLOPIDOGREL BISULFATE 75 MILLIGRAM(S): 75 TABLET, FILM COATED ORAL at 11:38

## 2021-01-25 RX ADMIN — CEFTRIAXONE 100 MILLIGRAM(S): 500 INJECTION, POWDER, FOR SOLUTION INTRAMUSCULAR; INTRAVENOUS at 12:45

## 2021-01-25 RX ADMIN — SIMVASTATIN 20 MILLIGRAM(S): 20 TABLET, FILM COATED ORAL at 20:29

## 2021-01-25 RX ADMIN — INSULIN HUMAN 7 UNIT(S)/HR: 100 INJECTION, SOLUTION SUBCUTANEOUS at 11:40

## 2021-01-25 RX ADMIN — INSULIN HUMAN 7 UNIT(S)/HR: 100 INJECTION, SOLUTION SUBCUTANEOUS at 20:27

## 2021-01-25 RX ADMIN — INSULIN HUMAN 7 UNIT(S)/HR: 100 INJECTION, SOLUTION SUBCUTANEOUS at 07:53

## 2021-01-25 RX ADMIN — TAMSULOSIN HYDROCHLORIDE 0.4 MILLIGRAM(S): 0.4 CAPSULE ORAL at 20:29

## 2021-01-25 RX ADMIN — PROPOFOL 15.5 MICROGRAM(S)/KG/MIN: 10 INJECTION, EMULSION INTRAVENOUS at 07:53

## 2021-01-25 RX ADMIN — CISATRACURIUM BESYLATE 14.9 MICROGRAM(S)/KG/MIN: 2 INJECTION INTRAVENOUS at 07:52

## 2021-01-25 RX ADMIN — CHLORHEXIDINE GLUCONATE 15 MILLILITER(S): 213 SOLUTION TOPICAL at 17:58

## 2021-01-25 RX ADMIN — MIDAZOLAM HYDROCHLORIDE 1.65 MG/KG/HR: 1 INJECTION, SOLUTION INTRAMUSCULAR; INTRAVENOUS at 07:53

## 2021-01-25 RX ADMIN — MIDAZOLAM HYDROCHLORIDE 1.65 MG/KG/HR: 1 INJECTION, SOLUTION INTRAMUSCULAR; INTRAVENOUS at 16:43

## 2021-01-25 RX ADMIN — MIDAZOLAM HYDROCHLORIDE 1.65 MG/KG/HR: 1 INJECTION, SOLUTION INTRAMUSCULAR; INTRAVENOUS at 11:41

## 2021-01-25 RX ADMIN — PROPOFOL 15.5 MICROGRAM(S)/KG/MIN: 10 INJECTION, EMULSION INTRAVENOUS at 20:27

## 2021-01-25 NOTE — PROGRESS NOTE ADULT - ATTENDING COMMENTS
71 y/o M with PMH as above here with acute hypoxic respiratory secondary to COVID-19 viral pneumonia.  Patient was intubated in the ER and admitted to the MICU for ARDS.  Patient set to 6 cc/kg tidal volume setting per ARDSnet, stress index noted to be 1. AM ABG was 7.46/34/70 on an FiO2 of 50%.  Cont to trend serial ABGs.  GDE shows a thick RV but no e/o dilatation.  Patient sedated and paralyzed. Wean paralysis today.  Cont remdesivir and decadron.  OLGA, will cont to trend BMPs.  AGMA was noted related to DKA.  AG closed now.  Prognosis guarded.

## 2021-01-25 NOTE — PROGRESS NOTE ADULT - SUBJECTIVE AND OBJECTIVE BOX
INTERVAL HPI/OVERNIGHT EVENTS:  - Patient transitioned back onto insulin gtt due to uncontrolled blood sugars  - Weaned off of levophed gtt overnight, maintaining BP well    SUBJECTIVE: Patient seen and examined at bedside.   Unable to obtain 2/2 intubation/sedation    OBJECTIVE:    VITAL SIGNS:  ICU Vital Signs Last 24 Hrs  T(C): 35.8 (25 Jan 2021 07:00), Max: 38 (24 Jan 2021 17:00)  T(F): 96.4 (25 Jan 2021 07:00), Max: 100.4 (24 Jan 2021 17:00)  HR: 57 (25 Jan 2021 10:49) (56 - 69)  BP: 137/59 (25 Jan 2021 10:00) (96/43 - 155/66)  BP(mean): 78 (25 Jan 2021 10:00) (56 - 90)  ABP: 149/59 (25 Jan 2021 10:00) (95/42 - 173/67)  ABP(mean): 90 (25 Jan 2021 10:00) (56 - 105)  RR: 28 (25 Jan 2021 10:00) (28 - 28)  SpO2: 96% (25 Jan 2021 10:49) (89% - 98%)    Mode: AC/ CMV (Assist Control/ Continuous Mandatory Ventilation), RR (machine): 28, TV (machine): 440, FiO2: 40, PEEP: 12, ITime: 0.59, MAP: 19, PIP: 36    01-24 @ 07:01 - 01-25 @ 07:00  --------------------------------------------------------  IN: 2760.7 mL / OUT: 2305 mL / NET: 455.7 mL    01-25 @ 07:01 - 01-25 @ 10:59  --------------------------------------------------------  IN: 278.9 mL / OUT: 475 mL / NET: -196.1 mL      CAPILLARY BLOOD GLUCOSE      POCT Blood Glucose.: 190 mg/dL (25 Jan 2021 09:54)      PHYSICAL EXAM:    General: Sedated appropriately, on mechanical ventilation. Breathing with the vent.    HEENT: pinpoint pupils bilaterally, equal and reactive. NCAT. +OG, +ETT   Neck: +LIJ TLC ; no lymphadenopathy   Respiratory: Equal chest rise bilaterally, clear and equal breath sounds bilaterally   Cardiovascular: RRR, Normal S1S2   Abdomen: soft, NT/ND; +BS x4  Extremities: WWP, 2+ peripheral pulses b/l; no LE edema  Skin: normal color and turgor; no rash  Neurological: RASS -5    MEDICATIONS:  MEDICATIONS  (STANDING):  aspirin  chewable 81 milliGRAM(s) Oral daily  cefTRIAXone   IVPB 1000 milliGRAM(s) IV Intermittent every 24 hours  chlorhexidine 0.12% Liquid 15 milliLiter(s) Oral Mucosa every 12 hours  chlorhexidine 4% Liquid 1 Application(s) Topical <User Schedule>  cisatracurium Infusion 3 MICROgram(s)/kG/Min (14.9 mL/Hr) IV Continuous <Continuous>  clopidogrel Tablet 75 milliGRAM(s) Oral daily  dexAMETHasone     Tablet 6 milliGRAM(s) Oral daily  enoxaparin Injectable 40 milliGRAM(s) SubCutaneous daily  fenofibrate Tablet 145 milliGRAM(s) Oral daily  fentaNYL   Infusion..... 1.5 MICROgram(s)/kG/Hr (2.48 mL/Hr) IV Continuous <Continuous>  glucagon  Injectable 1 milliGRAM(s) IntraMuscular once  glucagon  Injectable 1 milliGRAM(s) IntraMuscular once  insulin regular Infusion 7 Unit(s)/Hr (7 mL/Hr) IV Continuous <Continuous>  midazolam Infusion 0.02 mG/kG/Hr (1.65 mL/Hr) IV Continuous <Continuous>  norepinephrine Infusion 0.05 MICROgram(s)/kG/Min (7.74 mL/Hr) IV Continuous <Continuous>  polyethylene glycol 3350 17 Gram(s) Oral daily  propofol Infusion 30 MICROgram(s)/kG/Min (15.5 mL/Hr) IV Continuous <Continuous>  remdesivir  IVPB   IV Intermittent   remdesivir  IVPB 100 milliGRAM(s) IV Intermittent every 24 hours  senna Syrup 10 milliLiter(s) Oral two times a day  simvastatin 20 milliGRAM(s) Oral at bedtime  tamsulosin 0.4 milliGRAM(s) Oral at bedtime  vancomycin  IVPB 1250 milliGRAM(s) IV Intermittent every 12 hours    MEDICATIONS  (PRN):  sodium chloride 0.9% lock flush 10 milliLiter(s) IV Push every 1 hour PRN Pre/post blood products, medications, blood draw, and to maintain line patency      ALLERGIES:  Allergies  No Known Allergies  Intolerances        LABS:                        11.5   13.41 )-----------( 322      ( 25 Jan 2021 04:33 )             37.2     01-25    141  |  107  |  38<H>  ----------------------------<  203<H>  4.9   |  23  |  1.06    Ca    8.2<L>      25 Jan 2021 08:39  Phos  3.3     01-25  Mg     2.2     01-25    TPro  6.1  /  Alb  2.6<L>  /  TBili  0.4  /  DBili  x   /  AST  17  /  ALT  8   /  AlkPhos  63  01-25          RADIOLOGY & ADDITIONAL TESTS: Reviewed.

## 2021-01-25 NOTE — PROGRESS NOTE ADULT - ASSESSMENT
70-year-old man, PMHx of HTN, HLD, CAD, who presented to the emergency department with acute respiratory failure with hypoxia secondary to covid-19-associated pneumonia whose condition has progressed to severe acute respiratory distress syndrome requiring endotracheal intubation and management on a mechanical ventilator.     Neuro:  -sedated with propofol, midazolam, fentanyl   -c/w paralysis on nimbex, for complete assist control and lung-protective strategy  -will start weaning paralytics today    Cardio:  -Vasoplegic shock state 2/2 sedative medications  -off pressors, c/w monitor   -CAD s/p stents in 2015?; c/w aspirin and plavix    Respiratory:  -Acute hypoxic respiratory failure 2/2 COVID, progressed to severe ARDs; atelectasis noted at bilateral lung bases on pocus; will consider proning if P/F ratio worsens, currently stable/improving, will hold off proning,  -Vent settings: 28/440/12/50; ABG 7.46/34/70/25  -Repeat ABG     ID:  -COVID-19 pneumonia; c/w dexamethasone for ten-day course (1/22-), remdesivir (1/22-) for 5 day course  -procalcitonin marginally elevated, no focal consolidation on chest xray  -sputum 1/23 w/ staph aureus and h.influenzae. Will start on Vanc and Ceftriaxone f/u s&s    GI:  -OG tube in place, c/w tube feeds  -Patient w/o BMs, increase bowel regimen from daily to bid.     Renal:  -Oswald catheter placed; monitor Is&Os, monitor daily BMPs.  -OLGA improving    Endo:  -DMII A1c 9.6%, reportedly well controlled at home on 68U basaglar, Novolog 15tid.   -On admission, noted to be in DKA given glucose >200, bicarbonate <18, and pH <7.25; now resolved s/p insulin gtt. Patient transitioned to NPH however FS in 300s-400s. Will place back on insulin gtt (patient requires high insulin doses at home, requirements increasing even more in setting of steroid use)  -c/w insulin gtt, will transition to SQ tomorrow   -daily bmp, q1h fingerstick glucose     Hematologic:  -DVT prophylaxis with lovenox 40 subcutaneous daily; no anemia, leukocytosis, leukopenia, or thrombocytopenia     Skin:  -No known decubiti at the time of admission   -Right radial arterial line and LIJ venous catheter in place     Ethics:  Full code  Contact wife for updates

## 2021-01-26 LAB
ALBUMIN SERPL ELPH-MCNC: 2.4 G/DL — LOW (ref 3.3–5)
ALP SERPL-CCNC: 54 U/L — SIGNIFICANT CHANGE UP (ref 40–120)
ALT FLD-CCNC: 9 U/L — SIGNIFICANT CHANGE UP (ref 4–41)
ANION GAP SERPL CALC-SCNC: 10 MMOL/L — SIGNIFICANT CHANGE UP (ref 7–14)
AST SERPL-CCNC: 16 U/L — SIGNIFICANT CHANGE UP (ref 4–40)
BASOPHILS # BLD AUTO: 0.02 K/UL — SIGNIFICANT CHANGE UP (ref 0–0.2)
BASOPHILS NFR BLD AUTO: 0.2 % — SIGNIFICANT CHANGE UP (ref 0–2)
BILIRUB SERPL-MCNC: 0.3 MG/DL — SIGNIFICANT CHANGE UP (ref 0.2–1.2)
BLOOD GAS ARTERIAL COMPREHENSIVE RESULT: SIGNIFICANT CHANGE UP
BUN SERPL-MCNC: 39 MG/DL — HIGH (ref 7–23)
BUN SERPL-MCNC: 45 MG/DL — HIGH (ref 7–23)
BUN SERPL-MCNC: 48 MG/DL — HIGH (ref 7–23)
CALCIUM SERPL-MCNC: 7.6 MG/DL — LOW (ref 8.4–10.5)
CALCIUM SERPL-MCNC: 8.4 MG/DL — SIGNIFICANT CHANGE UP (ref 8.4–10.5)
CALCIUM SERPL-MCNC: 8.5 MG/DL — SIGNIFICANT CHANGE UP (ref 8.4–10.5)
CHLORIDE SERPL-SCNC: 106 MMOL/L — SIGNIFICANT CHANGE UP (ref 98–107)
CHLORIDE SERPL-SCNC: 107 MMOL/L — SIGNIFICANT CHANGE UP (ref 98–107)
CHLORIDE SERPL-SCNC: 107 MMOL/L — SIGNIFICANT CHANGE UP (ref 98–107)
CO2 SERPL-SCNC: 23 MMOL/L — SIGNIFICANT CHANGE UP (ref 22–31)
CO2 SERPL-SCNC: 23 MMOL/L — SIGNIFICANT CHANGE UP (ref 22–31)
CO2 SERPL-SCNC: 25 MMOL/L — SIGNIFICANT CHANGE UP (ref 22–31)
CREAT SERPL-MCNC: 1.14 MG/DL — SIGNIFICANT CHANGE UP (ref 0.5–1.3)
CREAT SERPL-MCNC: 1.27 MG/DL — SIGNIFICANT CHANGE UP (ref 0.5–1.3)
CREAT SERPL-MCNC: 1.3 MG/DL — SIGNIFICANT CHANGE UP (ref 0.5–1.3)
EOSINOPHIL # BLD AUTO: 0 K/UL — SIGNIFICANT CHANGE UP (ref 0–0.5)
EOSINOPHIL NFR BLD AUTO: 0 % — SIGNIFICANT CHANGE UP (ref 0–6)
GLUCOSE BLDC GLUCOMTR-MCNC: 119 MG/DL — HIGH (ref 70–99)
GLUCOSE BLDC GLUCOMTR-MCNC: 120 MG/DL — HIGH (ref 70–99)
GLUCOSE BLDC GLUCOMTR-MCNC: 122 MG/DL — HIGH (ref 70–99)
GLUCOSE BLDC GLUCOMTR-MCNC: 126 MG/DL — HIGH (ref 70–99)
GLUCOSE BLDC GLUCOMTR-MCNC: 135 MG/DL — HIGH (ref 70–99)
GLUCOSE BLDC GLUCOMTR-MCNC: 135 MG/DL — HIGH (ref 70–99)
GLUCOSE BLDC GLUCOMTR-MCNC: 142 MG/DL — HIGH (ref 70–99)
GLUCOSE BLDC GLUCOMTR-MCNC: 304 MG/DL — HIGH (ref 70–99)
GLUCOSE BLDC GLUCOMTR-MCNC: 330 MG/DL — HIGH (ref 70–99)
GLUCOSE SERPL-MCNC: 136 MG/DL — HIGH (ref 70–99)
GLUCOSE SERPL-MCNC: 164 MG/DL — HIGH (ref 70–99)
GLUCOSE SERPL-MCNC: 320 MG/DL — HIGH (ref 70–99)
HCT VFR BLD CALC: 33.9 % — LOW (ref 39–50)
HGB BLD-MCNC: 10.9 G/DL — LOW (ref 13–17)
IANC: 9.41 K/UL — HIGH (ref 1.5–8.5)
IMM GRANULOCYTES NFR BLD AUTO: 2.8 % — HIGH (ref 0–1.5)
LYMPHOCYTES # BLD AUTO: 1.13 K/UL — SIGNIFICANT CHANGE UP (ref 1–3.3)
LYMPHOCYTES # BLD AUTO: 9.4 % — LOW (ref 13–44)
MAGNESIUM SERPL-MCNC: 2.5 MG/DL — SIGNIFICANT CHANGE UP (ref 1.6–2.6)
MAGNESIUM SERPL-MCNC: 2.6 MG/DL — SIGNIFICANT CHANGE UP (ref 1.6–2.6)
MAGNESIUM SERPL-MCNC: 2.6 MG/DL — SIGNIFICANT CHANGE UP (ref 1.6–2.6)
MCHC RBC-ENTMCNC: 26.2 PG — LOW (ref 27–34)
MCHC RBC-ENTMCNC: 32.2 GM/DL — SIGNIFICANT CHANGE UP (ref 32–36)
MCV RBC AUTO: 81.5 FL — SIGNIFICANT CHANGE UP (ref 80–100)
MONOCYTES # BLD AUTO: 1.15 K/UL — HIGH (ref 0–0.9)
MONOCYTES NFR BLD AUTO: 9.5 % — SIGNIFICANT CHANGE UP (ref 2–14)
NEUTROPHILS # BLD AUTO: 9.41 K/UL — HIGH (ref 1.8–7.4)
NEUTROPHILS NFR BLD AUTO: 78.1 % — HIGH (ref 43–77)
NRBC # BLD: 0 /100 WBCS — SIGNIFICANT CHANGE UP
NRBC # FLD: 0.02 K/UL — HIGH
PHOSPHATE SERPL-MCNC: 4 MG/DL — SIGNIFICANT CHANGE UP (ref 2.5–4.5)
PHOSPHATE SERPL-MCNC: 4.5 MG/DL — SIGNIFICANT CHANGE UP (ref 2.5–4.5)
PHOSPHATE SERPL-MCNC: 4.7 MG/DL — HIGH (ref 2.5–4.5)
PLATELET # BLD AUTO: 304 K/UL — SIGNIFICANT CHANGE UP (ref 150–400)
POTASSIUM SERPL-MCNC: 4.3 MMOL/L — SIGNIFICANT CHANGE UP (ref 3.5–5.3)
POTASSIUM SERPL-MCNC: 4.4 MMOL/L — SIGNIFICANT CHANGE UP (ref 3.5–5.3)
POTASSIUM SERPL-MCNC: 5.1 MMOL/L — SIGNIFICANT CHANGE UP (ref 3.5–5.3)
POTASSIUM SERPL-SCNC: 4.3 MMOL/L — SIGNIFICANT CHANGE UP (ref 3.5–5.3)
POTASSIUM SERPL-SCNC: 4.4 MMOL/L — SIGNIFICANT CHANGE UP (ref 3.5–5.3)
POTASSIUM SERPL-SCNC: 5.1 MMOL/L — SIGNIFICANT CHANGE UP (ref 3.5–5.3)
PROT SERPL-MCNC: 5.9 G/DL — LOW (ref 6–8.3)
RBC # BLD: 4.16 M/UL — LOW (ref 4.2–5.8)
RBC # FLD: 14.8 % — HIGH (ref 10.3–14.5)
SODIUM SERPL-SCNC: 139 MMOL/L — SIGNIFICANT CHANGE UP (ref 135–145)
SODIUM SERPL-SCNC: 140 MMOL/L — SIGNIFICANT CHANGE UP (ref 135–145)
SODIUM SERPL-SCNC: 142 MMOL/L — SIGNIFICANT CHANGE UP (ref 135–145)
WBC # BLD: 12.05 K/UL — HIGH (ref 3.8–10.5)
WBC # FLD AUTO: 12.05 K/UL — HIGH (ref 3.8–10.5)

## 2021-01-26 PROCEDURE — 99291 CRITICAL CARE FIRST HOUR: CPT

## 2021-01-26 RX ORDER — AMPICILLIN SODIUM AND SULBACTAM SODIUM 250; 125 MG/ML; MG/ML
3 INJECTION, POWDER, FOR SUSPENSION INTRAMUSCULAR; INTRAVENOUS EVERY 6 HOURS
Refills: 0 | Status: DISCONTINUED | OUTPATIENT
Start: 2021-01-26 | End: 2021-02-02

## 2021-01-26 RX ORDER — HUMAN INSULIN 100 [IU]/ML
40 INJECTION, SUSPENSION SUBCUTANEOUS EVERY 6 HOURS
Refills: 0 | Status: DISCONTINUED | OUTPATIENT
Start: 2021-01-26 | End: 2021-01-27

## 2021-01-26 RX ORDER — INSULIN LISPRO 100/ML
VIAL (ML) SUBCUTANEOUS
Refills: 0 | Status: DISCONTINUED | OUTPATIENT
Start: 2021-01-26 | End: 2021-01-26

## 2021-01-26 RX ORDER — INSULIN LISPRO 100/ML
VIAL (ML) SUBCUTANEOUS EVERY 6 HOURS
Refills: 0 | Status: DISCONTINUED | OUTPATIENT
Start: 2021-01-26 | End: 2021-02-02

## 2021-01-26 RX ORDER — INSULIN HUMAN 100 [IU]/ML
7 INJECTION, SOLUTION SUBCUTANEOUS
Qty: 100 | Refills: 0 | Status: DISCONTINUED | OUTPATIENT
Start: 2021-01-26 | End: 2021-01-26

## 2021-01-26 RX ORDER — INSULIN LISPRO 100/ML
VIAL (ML) SUBCUTANEOUS AT BEDTIME
Refills: 0 | Status: DISCONTINUED | OUTPATIENT
Start: 2021-01-26 | End: 2021-01-26

## 2021-01-26 RX ADMIN — CHLORHEXIDINE GLUCONATE 15 MILLILITER(S): 213 SOLUTION TOPICAL at 17:42

## 2021-01-26 RX ADMIN — CHLORHEXIDINE GLUCONATE 15 MILLILITER(S): 213 SOLUTION TOPICAL at 05:05

## 2021-01-26 RX ADMIN — Medication 145 MILLIGRAM(S): at 11:34

## 2021-01-26 RX ADMIN — SENNA PLUS 10 MILLILITER(S): 8.6 TABLET ORAL at 05:05

## 2021-01-26 RX ADMIN — AMPICILLIN SODIUM AND SULBACTAM SODIUM 200 GRAM(S): 250; 125 INJECTION, POWDER, FOR SUSPENSION INTRAMUSCULAR; INTRAVENOUS at 22:39

## 2021-01-26 RX ADMIN — POLYETHYLENE GLYCOL 3350 17 GRAM(S): 17 POWDER, FOR SOLUTION ORAL at 05:05

## 2021-01-26 RX ADMIN — SENNA PLUS 10 MILLILITER(S): 8.6 TABLET ORAL at 17:41

## 2021-01-26 RX ADMIN — POLYETHYLENE GLYCOL 3350 17 GRAM(S): 17 POWDER, FOR SOLUTION ORAL at 18:23

## 2021-01-26 RX ADMIN — REMDESIVIR 500 MILLIGRAM(S): 5 INJECTION INTRAVENOUS at 22:39

## 2021-01-26 RX ADMIN — CEFTRIAXONE 100 MILLIGRAM(S): 500 INJECTION, POWDER, FOR SOLUTION INTRAMUSCULAR; INTRAVENOUS at 10:08

## 2021-01-26 RX ADMIN — CLOPIDOGREL BISULFATE 75 MILLIGRAM(S): 75 TABLET, FILM COATED ORAL at 11:33

## 2021-01-26 RX ADMIN — SIMVASTATIN 20 MILLIGRAM(S): 20 TABLET, FILM COATED ORAL at 22:39

## 2021-01-26 RX ADMIN — TAMSULOSIN HYDROCHLORIDE 0.4 MILLIGRAM(S): 0.4 CAPSULE ORAL at 22:39

## 2021-01-26 RX ADMIN — MIDAZOLAM HYDROCHLORIDE 1.65 MG/KG/HR: 1 INJECTION, SOLUTION INTRAMUSCULAR; INTRAVENOUS at 07:58

## 2021-01-26 RX ADMIN — ENOXAPARIN SODIUM 40 MILLIGRAM(S): 100 INJECTION SUBCUTANEOUS at 11:33

## 2021-01-26 RX ADMIN — AMPICILLIN SODIUM AND SULBACTAM SODIUM 200 GRAM(S): 250; 125 INJECTION, POWDER, FOR SUSPENSION INTRAMUSCULAR; INTRAVENOUS at 17:41

## 2021-01-26 RX ADMIN — HUMAN INSULIN 40 UNIT(S): 100 INJECTION, SUSPENSION SUBCUTANEOUS at 22:39

## 2021-01-26 RX ADMIN — CISATRACURIUM BESYLATE 14.9 MICROGRAM(S)/KG/MIN: 2 INJECTION INTRAVENOUS at 08:01

## 2021-01-26 RX ADMIN — FENTANYL CITRATE 2.48 MICROGRAM(S)/KG/HR: 50 INJECTION INTRAVENOUS at 07:59

## 2021-01-26 RX ADMIN — PROPOFOL 15.5 MICROGRAM(S)/KG/MIN: 10 INJECTION, EMULSION INTRAVENOUS at 08:00

## 2021-01-26 RX ADMIN — HUMAN INSULIN 40 UNIT(S): 100 INJECTION, SUSPENSION SUBCUTANEOUS at 17:50

## 2021-01-26 RX ADMIN — REMDESIVIR 500 MILLIGRAM(S): 5 INJECTION INTRAVENOUS at 02:12

## 2021-01-26 RX ADMIN — HUMAN INSULIN 40 UNIT(S): 100 INJECTION, SUSPENSION SUBCUTANEOUS at 12:29

## 2021-01-26 RX ADMIN — INSULIN HUMAN 7 UNIT(S)/HR: 100 INJECTION, SOLUTION SUBCUTANEOUS at 07:59

## 2021-01-26 RX ADMIN — Medication 1 APPLICATION(S): at 11:34

## 2021-01-26 RX ADMIN — INSULIN HUMAN 7 UNIT(S)/HR: 100 INJECTION, SOLUTION SUBCUTANEOUS at 13:20

## 2021-01-26 RX ADMIN — Medication 8: at 22:40

## 2021-01-26 RX ADMIN — Medication 6 MILLIGRAM(S): at 05:05

## 2021-01-26 RX ADMIN — AMPICILLIN SODIUM AND SULBACTAM SODIUM 200 GRAM(S): 250; 125 INJECTION, POWDER, FOR SUSPENSION INTRAMUSCULAR; INTRAVENOUS at 13:20

## 2021-01-26 RX ADMIN — CHLORHEXIDINE GLUCONATE 1 APPLICATION(S): 213 SOLUTION TOPICAL at 05:03

## 2021-01-26 RX ADMIN — Medication 81 MILLIGRAM(S): at 11:33

## 2021-01-26 RX ADMIN — Medication 8: at 17:49

## 2021-01-26 NOTE — PROGRESS NOTE ADULT - ATTENDING COMMENTS
69 y/o M with PMH as above here with acute hypoxic respiratory secondary to COVID-19 viral pneumonia.  Patient was intubated in the ER and admitted to the MICU for ARDS.  Patient set to 6 cc/kg tidal volume setting per ARDSnet, stress index noted to be 1. AM ABG was acceptable on an FiO2 of 50%.  Cont to trend serial ABGs.  GDE shows a thick RV but no e/o dilatation.  Patient sedated and paralyzed. Wean paralysis today.  Cont remdesivir and decadron.  OLGA, will cont to trend BMPs.  AGMA was noted related to DKA.  AG closed now.  MSSA and H. influenza in sputum, cont antibiotics.  Prognosis guarded.

## 2021-01-26 NOTE — PROGRESS NOTE ADULT - ASSESSMENT
70-year-old man, PMHx of HTN, HLD, CAD, who presented to the emergency department with acute respiratory failure with hypoxia secondary to covid-19-associated pneumonia whose condition has progressed to severe acute respiratory distress syndrome requiring endotracheal intubation and management on a mechanical ventilator.     Neuro:  -sedated with propofol, midazolam, fentanyl   -c/w paralysis on nimbex, for complete assist control and lung-protective strategy  -will start weaning paralytics today    Cardio:  -Vasoplegic shock state 2/2 sedative medications  -off pressors, c/w monitor   -CAD s/p stents in 2015?; c/w aspirin and plavix    Respiratory:  -Acute hypoxic respiratory failure 2/2 COVID, progressed to severe ARDs; atelectasis noted at bilateral lung bases on pocus; will consider proning if P/F ratio worsens, currently stable/improving, will hold off proning,  -Vent settings: 28/440/12/50; ABG 7.46/34/70/25  -Repeat ABG     ID:  -COVID-19 pneumonia; c/w dexamethasone for ten-day course (1/22-), remdesivir (1/22-) for 5 day course  -procalcitonin marginally elevated, no focal consolidation on chest xray  -sputum 1/23 w/ staph aureus and h.influenzae. Will start on Vanc and Ceftriaxone f/u s&s    GI:  -OG tube in place, c/w tube feeds  -Patient w/o BMs, increase bowel regimen from daily to bid.     Renal:  -Oswald catheter placed; monitor Is&Os, monitor daily BMPs.  -OLGA improving    Endo:  -DMII A1c 9.6%, reportedly well controlled at home on 68U basaglar, Novolog 15tid.   -On admission, noted to be in DKA given glucose >200, bicarbonate <18, and pH <7.25; now resolved s/p insulin gtt. Patient transitioned to NPH however FS in 300s-400s. Will place back on insulin gtt (patient requires high insulin doses at home, requirements increasing even more in setting of steroid use)  -c/w insulin gtt, will transition to SQ tomorrow   -daily bmp, q1h fingerstick glucose     Hematologic:  -DVT prophylaxis with lovenox 40 subcutaneous daily; no anemia, leukocytosis, leukopenia, or thrombocytopenia     Skin:  -No known decubiti at the time of admission   -Right radial arterial line and LIJ venous catheter in place     Ethics:  Full code  Contact wife for updates  70-year-old man, PMHx of HTN, HLD, CAD, who presented to the emergency department with acute respiratory failure with hypoxia secondary to covid-19-associated pneumonia whose condition has progressed to severe acute respiratory distress syndrome requiring endotracheal intubation and management on a mechanical ventilator.     Neuro:  -sedated with propofol, midazolam, fentanyl   -c/w paralysis on nimbex, for complete assist control and lung-protective strategy  -will start weaning paralytics today    Cardio:  -Vasoplegic shock state 2/2 sedative medications  -off pressors, c/w monitor   -CAD s/p stents in 2015?; c/w aspirin and plavix    Respiratory:  -Acute hypoxic respiratory failure 2/2 COVID, progressed to severe ARDs; atelectasis noted at bilateral lung bases on pocus; will consider proning if P/F ratio worsens, currently stable/improving, will hold off proning,  -Vent settings: 28/440/12/50; ABG 7.46/34/70/25  -Repeat ABG     ID:  -COVID-19 pneumonia; c/w dexamethasone for ten-day course (1/22-), remdesivir (1/22-) for 5 day course  -procalcitonin marginally elevated, no focal consolidation on chest xray  -sputum 1/23 w/ staph aureus and h.influenzae. Will d/c Cef and switch to Unasyn 3G q6h     GI:  -OG tube in place, c/w tube feeds  -Patient w/o BMs, c/w bowel regimen      Renal:  -Oswald catheter placed; monitor Is&Os, monitor daily BMPs.  -OLGA improving    Endo:  -DMII A1c 9.6%, reportedly well controlled at home on 68U basaglar, Novolog 15tid.   -On admission, noted to be in DKA given glucose >200, bicarbonate <18, and pH <7.25; now resolved s/p insulin gtt. Patient transitioned to NPH 40 units q6h today  -daily bmp, q1h fingerstick glucose     Hematologic:  -DVT prophylaxis with lovenox 40 subcutaneous daily; no anemia, leukocytosis, leukopenia, or thrombocytopenia     Skin:  -No known decubiti at the time of admission   -Right radial arterial line and LIJ venous catheter in place     Ethics:  Full code  Contact wife for updates

## 2021-01-26 NOTE — PROGRESS NOTE ADULT - SUBJECTIVE AND OBJECTIVE BOX
Nhan Rothman, PGY-1       INTERVAL HPI/OVERNIGHT EVENTS:    SUBJECTIVE: Patient seen and examined at bedside.       VITAL SIGNS:  ICU Vital Signs Last 24 Hrs  T(C): 37.2 (26 Jan 2021 08:00), Max: 37.2 (26 Jan 2021 08:00)  T(F): 98.9 (26 Jan 2021 08:00), Max: 98.9 (26 Jan 2021 08:00)  HR: 63 (26 Jan 2021 08:00) (56 - 63)  BP: 111/54 (26 Jan 2021 08:00) (108/54 - 142/61)  BP(mean): 67 (26 Jan 2021 08:00) (65 - 81)  ABP: 109/48 (26 Jan 2021 08:00) (109/48 - 153/61)  ABP(mean): 65 (26 Jan 2021 08:00) (65 - 94)  RR: 28 (26 Jan 2021 08:00) (28 - 29)  SpO2: 94% (26 Jan 2021 08:00) (92% - 100%)    Mode: AC/ CMV (Assist Control/ Continuous Mandatory Ventilation), RR (machine): 28, TV (machine): 440, FiO2: 50, PEEP: 12, ITime: 0.73, MAP: 19, PIP: 35  Plateau pressure:   P/F ratio:     01-25 @ 07:01 - 01-26 @ 07:00  --------------------------------------------------------  IN: 2602.4 mL / OUT: 1730 mL / NET: 872.4 mL    01-26 @ 07:01 - 01-26 @ 10:28  --------------------------------------------------------  IN: 328.4 mL / OUT: 325 mL / NET: 3.4 mL      CAPILLARY BLOOD GLUCOSE      POCT Blood Glucose.: 122 mg/dL (26 Jan 2021 10:07)    ECG:    PHYSICAL EXAM:    General:   HEENT:   Neck:   Respiratory:   Cardiovascular:   Abdomen:   Extremities:  Neurological:    MEDICATIONS:  MEDICATIONS  (STANDING):  aspirin  chewable 81 milliGRAM(s) Oral daily  cefTRIAXone   IVPB 1000 milliGRAM(s) IV Intermittent every 24 hours  chlorhexidine 0.12% Liquid 15 milliLiter(s) Oral Mucosa every 12 hours  chlorhexidine 4% Liquid 1 Application(s) Topical <User Schedule>  cisatracurium Infusion 3 MICROgram(s)/kG/Min (14.9 mL/Hr) IV Continuous <Continuous>  clopidogrel Tablet 75 milliGRAM(s) Oral daily  dexAMETHasone     Tablet 6 milliGRAM(s) Oral daily  enoxaparin Injectable 40 milliGRAM(s) SubCutaneous daily  fenofibrate Tablet 145 milliGRAM(s) Oral daily  fentaNYL   Infusion..... 1.501 MICROgram(s)/kG/Hr (2.48 mL/Hr) IV Continuous <Continuous>  glucagon  Injectable 1 milliGRAM(s) IntraMuscular once  glucagon  Injectable 1 milliGRAM(s) IntraMuscular once  insulin regular Infusion 7 Unit(s)/Hr (7 mL/Hr) IV Continuous <Continuous>  midazolam Infusion 0.02 mG/kG/Hr (1.65 mL/Hr) IV Continuous <Continuous>  norepinephrine Infusion 0.05 MICROgram(s)/kG/Min (7.74 mL/Hr) IV Continuous <Continuous>  petrolatum Ophthalmic Ointment 1 Application(s) Both EYES daily  polyethylene glycol 3350 17 Gram(s) Oral two times a day  propofol Infusion 31.275 MICROgram(s)/kG/Min (15.5 mL/Hr) IV Continuous <Continuous>  remdesivir  IVPB   IV Intermittent   remdesivir  IVPB 100 milliGRAM(s) IV Intermittent every 24 hours  senna Syrup 15 milliLiter(s) Oral two times a day  simvastatin 20 milliGRAM(s) Oral at bedtime  tamsulosin 0.4 milliGRAM(s) Oral at bedtime    MEDICATIONS  (PRN):  sodium chloride 0.9% lock flush 10 milliLiter(s) IV Push every 1 hour PRN Pre/post blood products, medications, blood draw, and to maintain line patency      ALLERGIES:  Allergies    No Known Allergies    Intolerances        LABS:                        10.9   12.05 )-----------( 304      ( 26 Jan 2021 00:57 )             33.9     01-26    142  |  107  |  45<H>  ----------------------------<  136<H>  4.4   |  25  |  1.30    Ca    8.4      26 Jan 2021 08:00  Phos  4.5     01-26  Mg     2.6     01-26    TPro  5.9<L>  /  Alb  2.4<L>  /  TBili  0.3  /  DBili  x   /  AST  16  /  ALT  9   /  AlkPhos  54  01-26          RADIOLOGY & ADDITIONAL TESTS: Reviewed. Nhan Rothman, PGY-1       INTERVAL HPI/OVERNIGHT EVENTS: Patient w/ watery diarrhea OV. No other sig events     SUBJECTIVE: Patient seen and examined at bedside. Unable to assess as patient is intubated and sedated      VITAL SIGNS:  ICU Vital Signs Last 24 Hrs  T(C): 37.2 (26 Jan 2021 08:00), Max: 37.2 (26 Jan 2021 08:00)  T(F): 98.9 (26 Jan 2021 08:00), Max: 98.9 (26 Jan 2021 08:00)  HR: 63 (26 Jan 2021 08:00) (56 - 63)  BP: 111/54 (26 Jan 2021 08:00) (108/54 - 142/61)  BP(mean): 67 (26 Jan 2021 08:00) (65 - 81)  ABP: 109/48 (26 Jan 2021 08:00) (109/48 - 153/61)  ABP(mean): 65 (26 Jan 2021 08:00) (65 - 94)  RR: 28 (26 Jan 2021 08:00) (28 - 29)  SpO2: 94% (26 Jan 2021 08:00) (92% - 100%)    Mode: AC/ CMV (Assist Control/ Continuous Mandatory Ventilation), RR (machine): 28, TV (machine): 440, FiO2: 50, PEEP: 12, ITime: 0.73, MAP: 19, PIP: 35  Plateau pressure:   P/F ratio:     01-25 @ 07:01 - 01-26 @ 07:00  --------------------------------------------------------  IN: 2602.4 mL / OUT: 1730 mL / NET: 872.4 mL    01-26 @ 07:01 - 01-26 @ 10:28  --------------------------------------------------------  IN: 328.4 mL / OUT: 325 mL / NET: 3.4 mL      CAPILLARY BLOOD GLUCOSE      POCT Blood Glucose.: 122 mg/dL (26 Jan 2021 10:07)    ECG:    PHYSICAL EXAM:    General: Sedated appropriately, on mechanical ventilation. Breathing with the vent.    HEENT: pinpoint pupils bilaterally, equal and reactive. NCAT. +OG, +ETT   Neck: +LIJ TLC ; no lymphadenopathy   Respiratory: Equal chest rise bilaterally, clear and equal breath sounds bilaterally   Cardiovascular: RRR, Normal S1S2   Abdomen: soft, NT/ND; +BS x4  Extremities: WWP, 2+ peripheral pulses b/l; no LE edema  Skin: normal color and turgor; no rash  Neurological: RASS -5    MEDICATIONS:  MEDICATIONS  (STANDING):  aspirin  chewable 81 milliGRAM(s) Oral daily  cefTRIAXone   IVPB 1000 milliGRAM(s) IV Intermittent every 24 hours  chlorhexidine 0.12% Liquid 15 milliLiter(s) Oral Mucosa every 12 hours  chlorhexidine 4% Liquid 1 Application(s) Topical <User Schedule>  cisatracurium Infusion 3 MICROgram(s)/kG/Min (14.9 mL/Hr) IV Continuous <Continuous>  clopidogrel Tablet 75 milliGRAM(s) Oral daily  dexAMETHasone     Tablet 6 milliGRAM(s) Oral daily  enoxaparin Injectable 40 milliGRAM(s) SubCutaneous daily  fenofibrate Tablet 145 milliGRAM(s) Oral daily  fentaNYL   Infusion..... 1.501 MICROgram(s)/kG/Hr (2.48 mL/Hr) IV Continuous <Continuous>  glucagon  Injectable 1 milliGRAM(s) IntraMuscular once  glucagon  Injectable 1 milliGRAM(s) IntraMuscular once  insulin regular Infusion 7 Unit(s)/Hr (7 mL/Hr) IV Continuous <Continuous>  midazolam Infusion 0.02 mG/kG/Hr (1.65 mL/Hr) IV Continuous <Continuous>  norepinephrine Infusion 0.05 MICROgram(s)/kG/Min (7.74 mL/Hr) IV Continuous <Continuous>  petrolatum Ophthalmic Ointment 1 Application(s) Both EYES daily  polyethylene glycol 3350 17 Gram(s) Oral two times a day  propofol Infusion 31.275 MICROgram(s)/kG/Min (15.5 mL/Hr) IV Continuous <Continuous>  remdesivir  IVPB   IV Intermittent   remdesivir  IVPB 100 milliGRAM(s) IV Intermittent every 24 hours  senna Syrup 15 milliLiter(s) Oral two times a day  simvastatin 20 milliGRAM(s) Oral at bedtime  tamsulosin 0.4 milliGRAM(s) Oral at bedtime    MEDICATIONS  (PRN):  sodium chloride 0.9% lock flush 10 milliLiter(s) IV Push every 1 hour PRN Pre/post blood products, medications, blood draw, and to maintain line patency      ALLERGIES:  Allergies    No Known Allergies    Intolerances        LABS:                        10.9   12.05 )-----------( 304      ( 26 Jan 2021 00:57 )             33.9     01-26    142  |  107  |  45<H>  ----------------------------<  136<H>  4.4   |  25  |  1.30    Ca    8.4      26 Jan 2021 08:00  Phos  4.5     01-26  Mg     2.6     01-26    TPro  5.9<L>  /  Alb  2.4<L>  /  TBili  0.3  /  DBili  x   /  AST  16  /  ALT  9   /  AlkPhos  54  01-26          RADIOLOGY & ADDITIONAL TESTS: Reviewed.

## 2021-01-26 NOTE — DIETITIAN INITIAL EVALUATION ADULT. - OTHER INFO
70-year-old man, PM Hx of HTN, HLD, CAD, who presented to the emergency department with acute respiratory failure with hypoxia secondary to covid-19-associated pneumonia whose condition has progressed to severe acute respiratory distress syndrome requiring endotracheal intubation and management on a mechanical ventilator. Pt. initiated on EN support , received 770 ml Glucerna 1.5 providing 1155 kcal & 63.5 gm protein in past 24 hrs with propofol 548 ml = 603 kcal for a total of 1758 kcal & 63.5 gm protein/d .

## 2021-01-26 NOTE — DIETITIAN INITIAL EVALUATION ADULT. - PERTINENT MEDS FT
Levophed, Fentanyl, Versed, Propofol, Insulin regular, Rocephin, Remdesivir, Flomax, Plavix, Lovenox, Fenofibrate, Senna, Decadron, Miralax , Unasyn, Nimbex, Zocor

## 2021-01-26 NOTE — DIETITIAN INITIAL EVALUATION ADULT. - ORAL INTAKE PTA/DIET HISTORY
Unable to conduct a face to face interview or nutrition-focused physical exam at this time due to limited contact restrictions related to patient's medical condition and isolation precautions. Pt. intubated. sedated .

## 2021-01-27 DIAGNOSIS — E11.65 TYPE 2 DIABETES MELLITUS WITH HYPERGLYCEMIA: ICD-10-CM

## 2021-01-27 DIAGNOSIS — I10 ESSENTIAL (PRIMARY) HYPERTENSION: ICD-10-CM

## 2021-01-27 DIAGNOSIS — E78.5 HYPERLIPIDEMIA, UNSPECIFIED: ICD-10-CM

## 2021-01-27 LAB
ALBUMIN SERPL ELPH-MCNC: 2.3 G/DL — LOW (ref 3.3–5)
ALP SERPL-CCNC: 55 U/L — SIGNIFICANT CHANGE UP (ref 40–120)
ALT FLD-CCNC: 11 U/L — SIGNIFICANT CHANGE UP (ref 4–41)
ANION GAP SERPL CALC-SCNC: 14 MMOL/L — SIGNIFICANT CHANGE UP (ref 7–14)
APPEARANCE UR: CLEAR — SIGNIFICANT CHANGE UP
APPEARANCE UR: CLEAR — SIGNIFICANT CHANGE UP
AST SERPL-CCNC: 12 U/L — SIGNIFICANT CHANGE UP (ref 4–40)
BASOPHILS # BLD AUTO: 0.03 K/UL — SIGNIFICANT CHANGE UP (ref 0–0.2)
BASOPHILS NFR BLD AUTO: 0.2 % — SIGNIFICANT CHANGE UP (ref 0–2)
BILIRUB SERPL-MCNC: 0.4 MG/DL — SIGNIFICANT CHANGE UP (ref 0.2–1.2)
BILIRUB UR-MCNC: NEGATIVE — SIGNIFICANT CHANGE UP
BILIRUB UR-MCNC: NEGATIVE — SIGNIFICANT CHANGE UP
BLOOD GAS ARTERIAL - LYTES,HGB,ICA,LACT RESULT: SIGNIFICANT CHANGE UP
BLOOD GAS ARTERIAL COMPREHENSIVE RESULT: SIGNIFICANT CHANGE UP
BUN SERPL-MCNC: 53 MG/DL — HIGH (ref 7–23)
CALCIUM SERPL-MCNC: 8.4 MG/DL — SIGNIFICANT CHANGE UP (ref 8.4–10.5)
CHLORIDE SERPL-SCNC: 105 MMOL/L — SIGNIFICANT CHANGE UP (ref 98–107)
CO2 SERPL-SCNC: 22 MMOL/L — SIGNIFICANT CHANGE UP (ref 22–31)
COLOR SPEC: YELLOW — SIGNIFICANT CHANGE UP
COLOR SPEC: YELLOW — SIGNIFICANT CHANGE UP
CREAT SERPL-MCNC: 1.36 MG/DL — HIGH (ref 0.5–1.3)
CRP SERPL-MCNC: 51.2 MG/L — HIGH
DIFF PNL FLD: NEGATIVE — SIGNIFICANT CHANGE UP
DIFF PNL FLD: NEGATIVE — SIGNIFICANT CHANGE UP
EOSINOPHIL # BLD AUTO: 0 K/UL — SIGNIFICANT CHANGE UP (ref 0–0.5)
EOSINOPHIL NFR BLD AUTO: 0 % — SIGNIFICANT CHANGE UP (ref 0–6)
GLUCOSE BLDC GLUCOMTR-MCNC: 218 MG/DL — HIGH (ref 70–99)
GLUCOSE BLDC GLUCOMTR-MCNC: 237 MG/DL — HIGH (ref 70–99)
GLUCOSE BLDC GLUCOMTR-MCNC: 280 MG/DL — HIGH (ref 70–99)
GLUCOSE BLDC GLUCOMTR-MCNC: 282 MG/DL — HIGH (ref 70–99)
GLUCOSE BLDC GLUCOMTR-MCNC: 323 MG/DL — HIGH (ref 70–99)
GLUCOSE SERPL-MCNC: 280 MG/DL — HIGH (ref 70–99)
GLUCOSE UR QL: ABNORMAL
GLUCOSE UR QL: ABNORMAL
GRAM STN FLD: SIGNIFICANT CHANGE UP
HCT VFR BLD CALC: 35.5 % — LOW (ref 39–50)
HGB BLD-MCNC: 10.8 G/DL — LOW (ref 13–17)
IANC: 11.56 K/UL — HIGH (ref 1.5–8.5)
IMM GRANULOCYTES NFR BLD AUTO: 3.1 % — HIGH (ref 0–1.5)
KETONES UR-MCNC: NEGATIVE — SIGNIFICANT CHANGE UP
KETONES UR-MCNC: NEGATIVE — SIGNIFICANT CHANGE UP
LEUKOCYTE ESTERASE UR-ACNC: NEGATIVE — SIGNIFICANT CHANGE UP
LEUKOCYTE ESTERASE UR-ACNC: NEGATIVE — SIGNIFICANT CHANGE UP
LYMPHOCYTES # BLD AUTO: 1.21 K/UL — SIGNIFICANT CHANGE UP (ref 1–3.3)
LYMPHOCYTES # BLD AUTO: 8 % — LOW (ref 13–44)
MAGNESIUM SERPL-MCNC: 2.8 MG/DL — HIGH (ref 1.6–2.6)
MCHC RBC-ENTMCNC: 25.7 PG — LOW (ref 27–34)
MCHC RBC-ENTMCNC: 30.4 GM/DL — LOW (ref 32–36)
MCV RBC AUTO: 84.5 FL — SIGNIFICANT CHANGE UP (ref 80–100)
MONOCYTES # BLD AUTO: 1.82 K/UL — HIGH (ref 0–0.9)
MONOCYTES NFR BLD AUTO: 12.1 % — SIGNIFICANT CHANGE UP (ref 2–14)
NEUTROPHILS # BLD AUTO: 11.56 K/UL — HIGH (ref 1.8–7.4)
NEUTROPHILS NFR BLD AUTO: 76.6 % — SIGNIFICANT CHANGE UP (ref 43–77)
NITRITE UR-MCNC: NEGATIVE — SIGNIFICANT CHANGE UP
NITRITE UR-MCNC: NEGATIVE — SIGNIFICANT CHANGE UP
NRBC # BLD: 0 /100 WBCS — SIGNIFICANT CHANGE UP
NRBC # FLD: 0.03 K/UL — HIGH
PH UR: 6 — SIGNIFICANT CHANGE UP (ref 5–8)
PH UR: 6 — SIGNIFICANT CHANGE UP (ref 5–8)
PHOSPHATE SERPL-MCNC: 4.7 MG/DL — HIGH (ref 2.5–4.5)
PLATELET # BLD AUTO: 370 K/UL — SIGNIFICANT CHANGE UP (ref 150–400)
POTASSIUM SERPL-MCNC: 4.7 MMOL/L — SIGNIFICANT CHANGE UP (ref 3.5–5.3)
POTASSIUM SERPL-SCNC: 4.7 MMOL/L — SIGNIFICANT CHANGE UP (ref 3.5–5.3)
PROT SERPL-MCNC: 6 G/DL — SIGNIFICANT CHANGE UP (ref 6–8.3)
PROT UR-MCNC: ABNORMAL
PROT UR-MCNC: ABNORMAL
RBC # BLD: 4.2 M/UL — SIGNIFICANT CHANGE UP (ref 4.2–5.8)
RBC # FLD: 14.9 % — HIGH (ref 10.3–14.5)
RBC CASTS # UR COMP ASSIST: 1 /HPF — SIGNIFICANT CHANGE UP (ref 0–4)
SODIUM SERPL-SCNC: 141 MMOL/L — SIGNIFICANT CHANGE UP (ref 135–145)
SP GR SPEC: 1.03 — HIGH (ref 1.01–1.02)
SP GR SPEC: 1.03 — HIGH (ref 1.01–1.02)
SPECIMEN SOURCE: SIGNIFICANT CHANGE UP
UROBILINOGEN FLD QL: SIGNIFICANT CHANGE UP
UROBILINOGEN FLD QL: SIGNIFICANT CHANGE UP
WBC # BLD: 15.09 K/UL — HIGH (ref 3.8–10.5)
WBC # FLD AUTO: 15.09 K/UL — HIGH (ref 3.8–10.5)
WBC UR QL: 2 /HPF — SIGNIFICANT CHANGE UP (ref 0–5)

## 2021-01-27 PROCEDURE — 99291 CRITICAL CARE FIRST HOUR: CPT

## 2021-01-27 PROCEDURE — 99223 1ST HOSP IP/OBS HIGH 75: CPT

## 2021-01-27 RX ORDER — ACETAMINOPHEN 500 MG
1000 TABLET ORAL ONCE
Refills: 0 | Status: COMPLETED | OUTPATIENT
Start: 2021-01-27 | End: 2021-01-27

## 2021-01-27 RX ORDER — DEXAMETHASONE 0.5 MG/5ML
6 ELIXIR ORAL DAILY
Refills: 0 | Status: DISCONTINUED | OUTPATIENT
Start: 2021-01-27 | End: 2021-01-28

## 2021-01-27 RX ORDER — NOREPINEPHRINE BITARTRATE/D5W 8 MG/250ML
0.05 PLASTIC BAG, INJECTION (ML) INTRAVENOUS
Qty: 16 | Refills: 0 | Status: DISCONTINUED | OUTPATIENT
Start: 2021-01-27 | End: 2021-01-29

## 2021-01-27 RX ORDER — HUMAN INSULIN 100 [IU]/ML
48 INJECTION, SUSPENSION SUBCUTANEOUS EVERY 6 HOURS
Refills: 0 | Status: DISCONTINUED | OUTPATIENT
Start: 2021-01-27 | End: 2021-01-27

## 2021-01-27 RX ADMIN — SIMVASTATIN 20 MILLIGRAM(S): 20 TABLET, FILM COATED ORAL at 21:54

## 2021-01-27 RX ADMIN — Medication 3.87 MICROGRAM(S)/KG/MIN: at 17:55

## 2021-01-27 RX ADMIN — MIDAZOLAM HYDROCHLORIDE 1.65 MG/KG/HR: 1 INJECTION, SOLUTION INTRAMUSCULAR; INTRAVENOUS at 08:00

## 2021-01-27 RX ADMIN — CISATRACURIUM BESYLATE 14.9 MICROGRAM(S)/KG/MIN: 2 INJECTION INTRAVENOUS at 08:00

## 2021-01-27 RX ADMIN — Medication 3.87 MICROGRAM(S)/KG/MIN: at 08:20

## 2021-01-27 RX ADMIN — Medication 81 MILLIGRAM(S): at 12:05

## 2021-01-27 RX ADMIN — CLOPIDOGREL BISULFATE 75 MILLIGRAM(S): 75 TABLET, FILM COATED ORAL at 12:05

## 2021-01-27 RX ADMIN — CISATRACURIUM BESYLATE 14.9 MICROGRAM(S)/KG/MIN: 2 INJECTION INTRAVENOUS at 23:05

## 2021-01-27 RX ADMIN — HUMAN INSULIN 40 UNIT(S): 100 INJECTION, SUSPENSION SUBCUTANEOUS at 13:22

## 2021-01-27 RX ADMIN — Medication 8: at 17:43

## 2021-01-27 RX ADMIN — FENTANYL CITRATE 2.48 MICROGRAM(S)/KG/HR: 50 INJECTION INTRAVENOUS at 21:54

## 2021-01-27 RX ADMIN — MIDAZOLAM HYDROCHLORIDE 1.65 MG/KG/HR: 1 INJECTION, SOLUTION INTRAMUSCULAR; INTRAVENOUS at 21:15

## 2021-01-27 RX ADMIN — AMPICILLIN SODIUM AND SULBACTAM SODIUM 200 GRAM(S): 250; 125 INJECTION, POWDER, FOR SUSPENSION INTRAMUSCULAR; INTRAVENOUS at 05:28

## 2021-01-27 RX ADMIN — Medication 400 MILLIGRAM(S): at 05:28

## 2021-01-27 RX ADMIN — Medication 4: at 05:30

## 2021-01-27 RX ADMIN — PROPOFOL 15.5 MICROGRAM(S)/KG/MIN: 10 INJECTION, EMULSION INTRAVENOUS at 08:00

## 2021-01-27 RX ADMIN — AMPICILLIN SODIUM AND SULBACTAM SODIUM 200 GRAM(S): 250; 125 INJECTION, POWDER, FOR SUSPENSION INTRAMUSCULAR; INTRAVENOUS at 17:31

## 2021-01-27 RX ADMIN — HUMAN INSULIN 40 UNIT(S): 100 INJECTION, SUSPENSION SUBCUTANEOUS at 05:30

## 2021-01-27 RX ADMIN — Medication 1 APPLICATION(S): at 12:06

## 2021-01-27 RX ADMIN — FENTANYL CITRATE 2.48 MICROGRAM(S)/KG/HR: 50 INJECTION INTRAVENOUS at 08:00

## 2021-01-27 RX ADMIN — CHLORHEXIDINE GLUCONATE 15 MILLILITER(S): 213 SOLUTION TOPICAL at 17:33

## 2021-01-27 RX ADMIN — Medication 6 MILLIGRAM(S): at 05:28

## 2021-01-27 RX ADMIN — CHLORHEXIDINE GLUCONATE 1 APPLICATION(S): 213 SOLUTION TOPICAL at 05:29

## 2021-01-27 RX ADMIN — TAMSULOSIN HYDROCHLORIDE 0.4 MILLIGRAM(S): 0.4 CAPSULE ORAL at 21:54

## 2021-01-27 RX ADMIN — ENOXAPARIN SODIUM 40 MILLIGRAM(S): 100 INJECTION SUBCUTANEOUS at 12:05

## 2021-01-27 RX ADMIN — AMPICILLIN SODIUM AND SULBACTAM SODIUM 200 GRAM(S): 250; 125 INJECTION, POWDER, FOR SUSPENSION INTRAMUSCULAR; INTRAVENOUS at 12:05

## 2021-01-27 RX ADMIN — CHLORHEXIDINE GLUCONATE 15 MILLILITER(S): 213 SOLUTION TOPICAL at 05:28

## 2021-01-27 RX ADMIN — Medication 145 MILLIGRAM(S): at 12:06

## 2021-01-27 NOTE — PROGRESS NOTE ADULT - ASSESSMENT
70M w/ HTN, HLD, CAD a/w hypoxic respiratory failure 2/2 COVID w/ superimposed bacterial PNA.     Neuro:  -sedated with propofol, midazolam, fentanyl   -c/w paralysis on nimbex, for complete assist control and lung-protective strategy    Cardio:  -Vasoplegic shock state 2/2 sedative medications, low dose levo, wean as tolerated  -CAD s/p stents in 2015?; c/w aspirin and plavix    Respiratory:  -Acute hypoxic respiratory failure 2/2 COVID, progressed to severe ARDs; atelectasis noted at bilateral lung bases on pocus; will consider proning if P/F ratio worsens, currently stable/improving, will hold off proning,  -Vent settings: 28/440/12/50; ABG 7.39/41/66, P:F 132  -Trend ABG     ID:  -COVID-19 pneumonia; c/w dexamethasone for ten-day course (1/22-), remdesivir stopped  -sputum 1/23 w/ staph aureus and h.influenzae. c/w Unasyn 3G q6h     GI:  -OG tube in place, c/w tube feeds  -Patient w/o BMs, c/w bowel regimen      Renal:  -Oswald catheter placed; monitor Is&Os, monitor daily BMPs.  -OLGA improving    Endo:  -DMII A1c 9.6%, reportedly well controlled at home on 68U basaglar, Novolog 15tid.   -On admission, noted to be in DKA given glucose >200, bicarbonate <18, and pH <7.25; now resolved s/p insulin gtt. Patient transitioned to NPH 40 units q6h today  -daily bmp, q1h fingerstick glucose   -Endo cs    Hematologic:  -DVT prophylaxis with lovenox 40 subcutaneous daily; no anemia, leukocytosis, leukopenia, or thrombocytopenia     Skin:  -No known decubiti at the time of admission   -Right radial arterial line and LIJ venous catheter in place     Ethics:  Full code  Contact wife for updates

## 2021-01-27 NOTE — CONSULT NOTE ADULT - ATTENDING COMMENTS
Megha Lanier MD   Pager # [phone]  On evenings and weekends, please call the office at [phone] or page endocrine fellow on call. Please note that this patient may be followed by different provider tomorrow. If no answer, contact the office.

## 2021-01-27 NOTE — CONSULT NOTE ADULT - ASSESSMENT
70 year old man with PMH CAD s/p stents x3 (2015), uncontrolled DM2, HTN, HLD, BPH who presents to the hospital with worsening SOB, admitted with COVID infection, now intubated. Consult called for management of uncontrolled DM2 in critically ill patient intubated for COVID infection. BG goal 140-180 mg/dL in ICU patient. High risk patient with high level decision making.

## 2021-01-27 NOTE — PROGRESS NOTE ADULT - SUBJECTIVE AND OBJECTIVE BOX
INTERVAL HPI/OVERNIGHT EVENTS: Febrile overnight.     SUBJECTIVE: Patient seen and examined at bedside.     OBJECTIVE:    VITAL SIGNS:  ICU Vital Signs Last 24 Hrs  T(C): 38.2 (2021 04:00), Max: 38.2 (2021 04:00)  T(F): 100.8 (2021 04:00), Max: 100.8 (2021 04:00)  HR: 67 (2021 08:00) (61 - 78)  BP: 130/56 (2021 22:00) (97/49 - 133/63)  BP(mean): 74 (2021 22:00) (59 - 80)  ABP: 116/51 (2021 08:00) (69/65 - 187/79)  ABP(mean): 70 (2021 08:00) (60 - 116)  RR: 28 (2021 08:00) (27 - 28)  SpO2: 94% (2021 08:00) (92% - 100%)    Mode: AC/ CMV (Assist Control/ Continuous Mandatory Ventilation), RR (machine): 28, TV (machine): 440, FiO2: 50, PEEP: 12, ITime: 0.88, MAP: 21, PIP: 36    - @ 07:01  -   @ 07:00  --------------------------------------------------------  IN: 1691.6 mL / OUT: 2200 mL / NET: -508.4 mL      CAPILLARY BLOOD GLUCOSE      POCT Blood Glucose.: 237 mg/dL (2021 05:22)      PHYSICAL EXAM:    General: Intubated, sedated  HEENT: NC/AT  Neck: supple  Respiratory: Mechanical breath sounds  Cardiovascular: +S1/S2; RRR  Abdomen: soft, NT/ND;  Extremities: no LE edema  Skin: LIJ TLC, RRA A-line  Neurological: Sedated    MEDICATIONS:  MEDICATIONS  (STANDING):  ampicillin/sulbactam  IVPB 3 Gram(s) IV Intermittent every 6 hours  aspirin  chewable 81 milliGRAM(s) Oral daily  chlorhexidine 0.12% Liquid 15 milliLiter(s) Oral Mucosa every 12 hours  chlorhexidine 4% Liquid 1 Application(s) Topical <User Schedule>  cisatracurium Infusion 3 MICROgram(s)/kG/Min (14.9 mL/Hr) IV Continuous <Continuous>  clopidogrel Tablet 75 milliGRAM(s) Oral daily  dexAMETHasone     Tablet 6 milliGRAM(s) Oral daily  enoxaparin Injectable 40 milliGRAM(s) SubCutaneous daily  fenofibrate Tablet 145 milliGRAM(s) Oral daily  fentaNYL   Infusion..... 1.501 MICROgram(s)/kG/Hr (2.48 mL/Hr) IV Continuous <Continuous>  glucagon  Injectable 1 milliGRAM(s) IntraMuscular once  glucagon  Injectable 1 milliGRAM(s) IntraMuscular once  insulin lispro (ADMELOG) corrective regimen sliding scale   SubCutaneous every 6 hours  insulin NPH human recombinant 40 Unit(s) SubCutaneous every 6 hours  midazolam Infusion 0.02 mG/kG/Hr (1.65 mL/Hr) IV Continuous <Continuous>  norepinephrine Infusion 0.05 MICROgram(s)/kG/Min (3.87 mL/Hr) IV Continuous <Continuous>  petrolatum Ophthalmic Ointment 1 Application(s) Both EYES daily  polyethylene glycol 3350 17 Gram(s) Oral two times a day  propofol Infusion 31.275 MICROgram(s)/kG/Min (15.5 mL/Hr) IV Continuous <Continuous>  senna Syrup 10 milliLiter(s) Oral two times a day  simvastatin 20 milliGRAM(s) Oral at bedtime  tamsulosin 0.4 milliGRAM(s) Oral at bedtime    MEDICATIONS  (PRN):  sodium chloride 0.9% lock flush 10 milliLiter(s) IV Push every 1 hour PRN Pre/post blood products, medications, blood draw, and to maintain line patency      ALLERGIES:  Allergies    No Known Allergies    Intolerances        LABS:                        10.8   15.09 )-----------( 370      ( 2021 03:58 )             35.5         141  |  105  |  53<H>  ----------------------------<  280<H>  4.7   |  22  |  1.36<H>    Ca    8.4      2021 03:58  Phos  4.7       Mg     2.8         TPro  6.0  /  Alb  2.3<L>  /  TBili  0.4  /  DBili  x   /  AST  12  /  ALT  11  /  AlkPhos  55        Urinalysis Basic - ( 2021 05:30 )    Color: Yellow / Appearance: Clear / S.030 / pH: x  Gluc: x / Ketone: Negative  / Bili: Negative / Urobili: <2 mg/dL   Blood: x / Protein: 100 mg/dL / Nitrite: Negative   Leuk Esterase: Negative / RBC: 1 /HPF / WBC 2 /HPF   Sq Epi: x / Non Sq Epi: x / Bacteria: x        RADIOLOGY & ADDITIONAL TESTS: Reviewed.

## 2021-01-27 NOTE — CONSULT NOTE ADULT - PROBLEM SELECTOR RECOMMENDATION 9
- A1c 9.6%  - now on continuous 24 hour tube feeds  - adjust NPH to 46 units q6 and hold if tube feeds are held  - moderate correction scale q6  - will follow  - for discharge: plan to dc on insulin, recs TBD

## 2021-01-27 NOTE — CHART NOTE - NSCHARTNOTEFT_GEN_A_CORE
Pt transferred from MICU to [facility].  Noted to have fever to 100.8F at 04:00.  Ordered blood, sputum cultures and UA.  Ofirmev x1.  WBC stable at 10, lactate 2.  P/F 136.  BG's 280-330 on NPH 40u q6hr plus ISS.   F/u cultures and trend fever curve.   Cont augmentin for +MSSA in sputum on 1/22.  MD Jackson

## 2021-01-28 LAB
ANION GAP SERPL CALC-SCNC: 12 MMOL/L — SIGNIFICANT CHANGE UP (ref 7–14)
APPEARANCE UR: CLEAR — SIGNIFICANT CHANGE UP
BACTERIA # UR AUTO: ABNORMAL
BASE EXCESS BLDA CALC-SCNC: -0.8 MMOL/L — SIGNIFICANT CHANGE UP (ref -2–2)
BASOPHILS # BLD AUTO: 0.05 K/UL — SIGNIFICANT CHANGE UP (ref 0–0.2)
BASOPHILS NFR BLD AUTO: 0.3 % — SIGNIFICANT CHANGE UP (ref 0–2)
BILIRUB UR-MCNC: NEGATIVE — SIGNIFICANT CHANGE UP
BLOOD GAS ARTERIAL - LYTES,HGB,ICA,LACT RESULT: SIGNIFICANT CHANGE UP
BUN SERPL-MCNC: 49 MG/DL — HIGH (ref 7–23)
CALCIUM SERPL-MCNC: 8.3 MG/DL — LOW (ref 8.4–10.5)
CHLORIDE SERPL-SCNC: 106 MMOL/L — SIGNIFICANT CHANGE UP (ref 98–107)
CO2 SERPL-SCNC: 23 MMOL/L — SIGNIFICANT CHANGE UP (ref 22–31)
COLOR SPEC: SIGNIFICANT CHANGE UP
CREAT SERPL-MCNC: 1.12 MG/DL — SIGNIFICANT CHANGE UP (ref 0.5–1.3)
CULTURE RESULTS: SIGNIFICANT CHANGE UP
CULTURE RESULTS: SIGNIFICANT CHANGE UP
DIFF PNL FLD: ABNORMAL
EOSINOPHIL # BLD AUTO: 0.04 K/UL — SIGNIFICANT CHANGE UP (ref 0–0.5)
EOSINOPHIL NFR BLD AUTO: 0.3 % — SIGNIFICANT CHANGE UP (ref 0–6)
GLUCOSE BLDC GLUCOMTR-MCNC: 298 MG/DL — HIGH (ref 70–99)
GLUCOSE BLDC GLUCOMTR-MCNC: 309 MG/DL — HIGH (ref 70–99)
GLUCOSE BLDC GLUCOMTR-MCNC: 357 MG/DL — HIGH (ref 70–99)
GLUCOSE BLDC GLUCOMTR-MCNC: 369 MG/DL — HIGH (ref 70–99)
GLUCOSE SERPL-MCNC: 293 MG/DL — HIGH (ref 70–99)
GLUCOSE UR QL: ABNORMAL
HCO3 BLDA-SCNC: 24 MMOL/L — SIGNIFICANT CHANGE UP (ref 22–26)
HCT VFR BLD CALC: 36.2 % — LOW (ref 39–50)
HGB BLD-MCNC: 11 G/DL — LOW (ref 13–17)
IANC: 10.69 K/UL — HIGH (ref 1.5–8.5)
IMM GRANULOCYTES NFR BLD AUTO: 5 % — HIGH (ref 0–1.5)
KETONES UR-MCNC: NEGATIVE — SIGNIFICANT CHANGE UP
LEUKOCYTE ESTERASE UR-ACNC: NEGATIVE — SIGNIFICANT CHANGE UP
LYMPHOCYTES # BLD AUTO: 1.21 K/UL — SIGNIFICANT CHANGE UP (ref 1–3.3)
LYMPHOCYTES # BLD AUTO: 8.3 % — LOW (ref 13–44)
MAGNESIUM SERPL-MCNC: 2.8 MG/DL — HIGH (ref 1.6–2.6)
MCHC RBC-ENTMCNC: 25.8 PG — LOW (ref 27–34)
MCHC RBC-ENTMCNC: 30.4 GM/DL — LOW (ref 32–36)
MCV RBC AUTO: 84.8 FL — SIGNIFICANT CHANGE UP (ref 80–100)
MONOCYTES # BLD AUTO: 1.78 K/UL — HIGH (ref 0–0.9)
MONOCYTES NFR BLD AUTO: 12.3 % — SIGNIFICANT CHANGE UP (ref 2–14)
NEUTROPHILS # BLD AUTO: 10.69 K/UL — HIGH (ref 1.8–7.4)
NEUTROPHILS NFR BLD AUTO: 73.8 % — SIGNIFICANT CHANGE UP (ref 43–77)
NITRITE UR-MCNC: NEGATIVE — SIGNIFICANT CHANGE UP
NRBC # BLD: 0 /100 WBCS — SIGNIFICANT CHANGE UP
NRBC # FLD: 0 K/UL — SIGNIFICANT CHANGE UP
PCO2 BLDA: 36 MMHG — SIGNIFICANT CHANGE UP (ref 35–48)
PH BLDA: 7.43 — SIGNIFICANT CHANGE UP (ref 7.35–7.45)
PH UR: 6 — SIGNIFICANT CHANGE UP (ref 5–8)
PHOSPHATE SERPL-MCNC: 4 MG/DL — SIGNIFICANT CHANGE UP (ref 2.5–4.5)
PLATELET # BLD AUTO: 379 K/UL — SIGNIFICANT CHANGE UP (ref 150–400)
PO2 BLDA: 111 MMHG — HIGH (ref 83–108)
POTASSIUM SERPL-MCNC: 4.7 MMOL/L — SIGNIFICANT CHANGE UP (ref 3.5–5.3)
POTASSIUM SERPL-SCNC: 4.7 MMOL/L — SIGNIFICANT CHANGE UP (ref 3.5–5.3)
PROCALCITONIN SERPL-MCNC: 0.29 NG/ML — HIGH (ref 0.02–0.1)
PROT UR-MCNC: NEGATIVE — SIGNIFICANT CHANGE UP
RBC # BLD: 4.27 M/UL — SIGNIFICANT CHANGE UP (ref 4.2–5.8)
RBC # FLD: 14.6 % — HIGH (ref 10.3–14.5)
RBC CASTS # UR COMP ASSIST: 5 /HPF — HIGH (ref 0–4)
SAO2 % BLDA: 97.8 % — SIGNIFICANT CHANGE UP (ref 95–99)
SODIUM SERPL-SCNC: 141 MMOL/L — SIGNIFICANT CHANGE UP (ref 135–145)
SP GR SPEC: 1.02 — SIGNIFICANT CHANGE UP (ref 1.01–1.02)
SPECIMEN SOURCE: SIGNIFICANT CHANGE UP
SPECIMEN SOURCE: SIGNIFICANT CHANGE UP
TRIGL SERPL-MCNC: 463 MG/DL — HIGH
UROBILINOGEN FLD QL: SIGNIFICANT CHANGE UP
WBC # BLD: 14.5 K/UL — HIGH (ref 3.8–10.5)
WBC # FLD AUTO: 14.5 K/UL — HIGH (ref 3.8–10.5)
WBC UR QL: 5 /HPF — SIGNIFICANT CHANGE UP (ref 0–5)

## 2021-01-28 RX ORDER — DEXTROSE 50 % IN WATER 50 %
12.5 SYRINGE (ML) INTRAVENOUS ONCE
Refills: 0 | Status: DISCONTINUED | OUTPATIENT
Start: 2021-01-28 | End: 2021-02-10

## 2021-01-28 RX ORDER — DEXTROSE 50 % IN WATER 50 %
25 SYRINGE (ML) INTRAVENOUS ONCE
Refills: 0 | Status: DISCONTINUED | OUTPATIENT
Start: 2021-01-28 | End: 2021-02-10

## 2021-01-28 RX ORDER — HUMAN INSULIN 100 [IU]/ML
50 INJECTION, SUSPENSION SUBCUTANEOUS EVERY 6 HOURS
Refills: 0 | Status: DISCONTINUED | OUTPATIENT
Start: 2021-01-28 | End: 2021-01-29

## 2021-01-28 RX ORDER — DEXAMETHASONE 0.5 MG/5ML
6 ELIXIR ORAL DAILY
Refills: 0 | Status: DISCONTINUED | OUTPATIENT
Start: 2021-01-28 | End: 2021-01-29

## 2021-01-28 RX ORDER — DEXTROSE 50 % IN WATER 50 %
15 SYRINGE (ML) INTRAVENOUS ONCE
Refills: 0 | Status: DISCONTINUED | OUTPATIENT
Start: 2021-01-28 | End: 2021-01-29

## 2021-01-28 RX ORDER — DEXTROSE 50 % IN WATER 50 %
25 SYRINGE (ML) INTRAVENOUS ONCE
Refills: 0 | Status: DISCONTINUED | OUTPATIENT
Start: 2021-01-28 | End: 2021-01-29

## 2021-01-28 RX ADMIN — CISATRACURIUM BESYLATE 14.9 MICROGRAM(S)/KG/MIN: 2 INJECTION INTRAVENOUS at 16:28

## 2021-01-28 RX ADMIN — AMPICILLIN SODIUM AND SULBACTAM SODIUM 200 GRAM(S): 250; 125 INJECTION, POWDER, FOR SUSPENSION INTRAMUSCULAR; INTRAVENOUS at 06:58

## 2021-01-28 RX ADMIN — ENOXAPARIN SODIUM 40 MILLIGRAM(S): 100 INJECTION SUBCUTANEOUS at 11:09

## 2021-01-28 RX ADMIN — HUMAN INSULIN 50 UNIT(S): 100 INJECTION, SUSPENSION SUBCUTANEOUS at 11:17

## 2021-01-28 RX ADMIN — Medication 145 MILLIGRAM(S): at 11:11

## 2021-01-28 RX ADMIN — Medication 1 APPLICATION(S): at 11:10

## 2021-01-28 RX ADMIN — CLOPIDOGREL BISULFATE 75 MILLIGRAM(S): 75 TABLET, FILM COATED ORAL at 11:11

## 2021-01-28 RX ADMIN — TAMSULOSIN HYDROCHLORIDE 0.4 MILLIGRAM(S): 0.4 CAPSULE ORAL at 22:11

## 2021-01-28 RX ADMIN — AMPICILLIN SODIUM AND SULBACTAM SODIUM 200 GRAM(S): 250; 125 INJECTION, POWDER, FOR SUSPENSION INTRAMUSCULAR; INTRAVENOUS at 11:09

## 2021-01-28 RX ADMIN — AMPICILLIN SODIUM AND SULBACTAM SODIUM 200 GRAM(S): 250; 125 INJECTION, POWDER, FOR SUSPENSION INTRAMUSCULAR; INTRAVENOUS at 16:28

## 2021-01-28 RX ADMIN — HUMAN INSULIN 50 UNIT(S): 100 INJECTION, SUSPENSION SUBCUTANEOUS at 16:38

## 2021-01-28 RX ADMIN — CHLORHEXIDINE GLUCONATE 15 MILLILITER(S): 213 SOLUTION TOPICAL at 06:58

## 2021-01-28 RX ADMIN — AMPICILLIN SODIUM AND SULBACTAM SODIUM 200 GRAM(S): 250; 125 INJECTION, POWDER, FOR SUSPENSION INTRAMUSCULAR; INTRAVENOUS at 00:59

## 2021-01-28 RX ADMIN — MIDAZOLAM HYDROCHLORIDE 1.65 MG/KG/HR: 1 INJECTION, SOLUTION INTRAMUSCULAR; INTRAVENOUS at 22:58

## 2021-01-28 RX ADMIN — Medication 81 MILLIGRAM(S): at 11:11

## 2021-01-28 RX ADMIN — Medication 4: at 00:05

## 2021-01-28 RX ADMIN — Medication 10: at 11:15

## 2021-01-28 RX ADMIN — CHLORHEXIDINE GLUCONATE 15 MILLILITER(S): 213 SOLUTION TOPICAL at 16:28

## 2021-01-28 RX ADMIN — Medication 10: at 16:42

## 2021-01-28 RX ADMIN — SIMVASTATIN 20 MILLIGRAM(S): 20 TABLET, FILM COATED ORAL at 22:11

## 2021-01-28 RX ADMIN — SENNA PLUS 10 MILLILITER(S): 8.6 TABLET ORAL at 16:28

## 2021-01-28 RX ADMIN — POLYETHYLENE GLYCOL 3350 17 GRAM(S): 17 POWDER, FOR SOLUTION ORAL at 16:29

## 2021-01-28 RX ADMIN — Medication 8: at 06:46

## 2021-01-28 RX ADMIN — Medication 6 MILLIGRAM(S): at 06:57

## 2021-01-28 RX ADMIN — FENTANYL CITRATE 2.48 MICROGRAM(S)/KG/HR: 50 INJECTION INTRAVENOUS at 11:51

## 2021-01-28 RX ADMIN — CHLORHEXIDINE GLUCONATE 1 APPLICATION(S): 213 SOLUTION TOPICAL at 06:46

## 2021-01-28 NOTE — CHART NOTE - NSCHARTNOTEFT_GEN_A_CORE
70 year old man with PMH CAD s/p stents x3 (2015), uncontrolled DM2, HTN, HLD, BPH who presents to the hospital with worsening SOB, admitted with COVID infection, now intubated. Consult called for management of uncontrolled DM2 in critically ill patient intubated for COVID infection. BG goal 140-180 mg/dL in ICU patient.   Current BG in the hyperglycemic range from 200-300 and noted to have an HgA1c of 9.6%.   -On Continuous 24 hour tube feeds   -Will increase NPH to 50 units q 6H and hold if tube feeds are held  -Moderate correctional scale q6H  -Endocrine Team will continue to monitor       Jono Osorio DO   [phone]

## 2021-01-28 NOTE — PROGRESS NOTE ADULT - ATTENDING COMMENTS
Dr. Loya (Attending Physician)  N - sedated with prop, fent, versed, parlyzed with cisatracurium   P - ARDS proned overnight for P:F < 150, now improved today, will supine today, P:F >300  C - was on low dose NE gtt overnight  GI - TFs at trickle overnight, will increase   - -800, glucosuria  H - lovenox daily, aspirin/plavix for ho cabg  ID - febrile overnight, procalcitonin low, will give unasyn x 5 day course  E - endo consulted, inc nph to 50, will discuss plan with endo

## 2021-01-28 NOTE — PROGRESS NOTE ADULT - SUBJECTIVE AND OBJECTIVE BOX
COVID 19 ICU Note    HISTORY  70y Male with COVID-19(+) pneumonia requiring intubation.    24 HOUR EVENTS: proned at 4 pm, no other major event     SUBJECTIVE/ROS: Due to intubation with sedation, subjective information was not able to be obtained from the patient. History was obtained, to the extent possible, from review of the chart and collateral sources of information.    NEURO  RASS:  Exam: sedated, minimally withdraws to pain in all four extremities, does not follow commands  Meds: cisatracurium Infusion 3 MICROgram(s)/kG/Min IV Continuous <Continuous>  fentaNYL   Infusion..... 1.501 MICROgram(s)/kG/Hr IV Continuous <Continuous>  midazolam Infusion 0.02 mG/kG/Hr IV Continuous <Continuous>  propofol Infusion 31.275 MICROgram(s)/kG/Min IV Continuous <Continuous>    Adequacy of sedation and pain control has been assessed and adjusted.    RESPIRATORY  RR: 28 (28 - 28)  SpO2: 100% (94% - 100%)  Exam: coarse rhonchi in all lung fields  Mechanical Ventilation: Mode: AC/ CMV (Assist Control/ Continuous Mandatory Ventilation), RR (machine): 28, TV (machine): 440, FiO2: 50, PEEP: 12  ABG - ( 28 Jan 2021 04:59 )  pH: 7.48  /  pCO2: 32    /  pO2: 161   / HCO3: 26    / Base Excess: 0.7   /  SaO2: 98.7    Lactate: x                PaO2 to FiO2 ratio:  Peak Pressure:  Plateau Pressure:  Patient not a candidate for a SBT at this time due to ventilator requirements    CARDIOVASCULAR  HR: 55 (55 - 100)  ABP: 141/69 (114/64 - 170/74)  ABP(mean): 94 (68 - 100)  Exam: regualr rate and rhythm, S1S2  Cardiac Rhythm: sinus  Most recent QTc:   Meds: norepinephrine Infusion (3.87 mL/Hr)  tamsulosin      GI/NUTRITION  Diet: Diet, NPO with Tube Feed:   Tube Feeding Modality: Orogastric  Glucerna 1.5 Toby (GLUCERNA1.5RTH)  Total Volume for 24 Hours (mL): 840  Continuous  Starting Tube Feed Rate mL per Hour: 10  Increase Tube Feed Rate by (mL): 10     Every 3 hours  Until Goal Tube Feed Rate (mL per Hour): 35  Tube Feed Duration (in Hours): 24  Tube Feed Start Time: 23:00  No Carb Prosource (1pkg = 15gms Protein)     Qty per Day:  2 packs (01-26-21 @ 14:29)    Most recent bowel movement:  Meds: polyethylene glycol 3350 17 Gram(s) Oral two times a day  senna Syrup 10 milliLiter(s) Oral two times a day  sodium chloride 0.9% lock flush 10 milliLiter(s) IV Push every 1 hour PRN Pre/post blood products, medications, blood draw, and to maintain line patency      GENITOURINARY  I&O's Detail    01-27 @ 07:01  -  01-28 @ 07:00  --------------------------------------------------------  IN:    Cisatracurium: 240 mL    Enteral Tube Flush: 60 mL    FentaNYL: 60 mL    Glucerna: 430 mL    IV PiggyBack: 100 mL    Midazolam: 192.3 mL    Norepinephrine: 24.3 mL    Propofol: 600 mL  Total IN: 1706.6 mL    OUT:    Indwelling Catheter - Urethral (mL): 2570 mL  Total OUT: 2570 mL    Total NET: -863.4 mL          01-28    141  |  106  |  49<H>  ----------------------------<  293<H>  4.7   |  23  |  1.12    Ca    8.3<L>      28 Jan 2021 04:59  Phos  4.0     01-28  Mg     2.8     01-28    TPro  6.0  /  Alb  2.3<L>  /  TBili  0.4  /  DBili  x   /  AST  12  /  ALT  11  /  AlkPhos  55  01-27    [x] Oswald catheter, indication: urine output monitoring in critically ill  Meds: sodium chloride 0.9% lock flush 10 milliLiter(s) IV Push every 1 hour PRN Pre/post blood products, medications, blood draw, and to maintain line patency        HEMATOLOGIC  Meds: aspirin  chewable 81 milliGRAM(s) Oral daily  clopidogrel Tablet 75 milliGRAM(s) Oral daily  enoxaparin Injectable 40 milliGRAM(s) SubCutaneous daily                          11.0   14.50 )-----------( 379      ( 28 Jan 2021 04:59 )             36.2           INFECTIOUS DISEASES  T(C): 36.4, Max: 38.2 (01-27-21 @ 08:00)  WBC Count: 14.50 (01-28-21 @ 04:59)  WBC Count: 15.09 (01-27-21 @ 03:58)    Recent Cultures:  Specimen Source: .Sputum Sputum trap, 01-27 @ 10:07; Results   Numerous Staphylococcus aureus; Gram Stain:   No polymorphonuclear leukocytes per low power field  No Squamous epithelial cells per low power field  Moderate Gram Positive Cocci in Clusters per oil power field; Organism: --  Specimen Source: .Blood Blood-Peripheral, 01-22 @ 21:17; Results   No Growth Final; Gram Stain: --; Organism: --  Specimen Source: .Sputum Sputum, 01-22 @ 15:00; Results   Moderate Staphylococcus aureus  Moderate Haemophilus influenzae "Susceptibilities not performed"  Normal Respiratory Susan present; Gram Stain:   Few polymorphonuclear leukocytes per low power field  Moderate Gram Positive Cocci per oil power field; Organism: Staphylococcus aureus    Meds: ampicillin/sulbactam  IVPB 3 Gram(s) IV Intermittent every 6 hours    Procalcitonin, Serum: 0.29 ng/mL (01-28-21 @ 04:59)      ENDOCRINE  Fingersticks: 309, 218, 323, 280, 237  Meds: dexAMETHasone  Injectable 6 milliGRAM(s) IV Push daily  fenofibrate Tablet 145 milliGRAM(s) Oral daily  glucagon  Injectable 1 milliGRAM(s) IntraMuscular once  glucagon  Injectable 1 milliGRAM(s) IntraMuscular once  insulin lispro (ADMELOG) corrective regimen sliding scale   SubCutaneous every 6 hours  simvastatin 20 milliGRAM(s) Oral at bedtime      ACCESS DEVICES:  [x] Peripheral IV  [x] Central Venous Line	[ ] R	[ ] L	[ ] IJ	[ ] Fem [ ] SC		Placed:   [x] Arterial Line			[ ] R	[ ] L	[ ] Fem [ ] Rad [ ] Ax	Placed:   [ ] Toya HD Access		[ ] R [ ] L  [ ] IJ  [ ] Fem			Placed:  [x] Urinary Catheter, Date Placed:   Necessity of urinary, arterial, and venous catheters discussed.    CODE STATUS:     IMAGING:

## 2021-01-29 LAB
-  AMPICILLIN/SULBACTAM: SIGNIFICANT CHANGE UP
-  CEFAZOLIN: SIGNIFICANT CHANGE UP
-  CLINDAMYCIN: SIGNIFICANT CHANGE UP
-  ERYTHROMYCIN: SIGNIFICANT CHANGE UP
-  GENTAMICIN: SIGNIFICANT CHANGE UP
-  OXACILLIN: SIGNIFICANT CHANGE UP
-  PENICILLIN: SIGNIFICANT CHANGE UP
-  RIFAMPIN: SIGNIFICANT CHANGE UP
-  TETRACYCLINE: SIGNIFICANT CHANGE UP
-  TRIMETHOPRIM/SULFAMETHOXAZOLE: SIGNIFICANT CHANGE UP
-  VANCOMYCIN: SIGNIFICANT CHANGE UP
ALBUMIN SERPL ELPH-MCNC: 2.6 G/DL — LOW (ref 3.3–5)
ALP SERPL-CCNC: 58 U/L — SIGNIFICANT CHANGE UP (ref 40–120)
ALT FLD-CCNC: 9 U/L — SIGNIFICANT CHANGE UP (ref 4–41)
ANION GAP SERPL CALC-SCNC: 13 MMOL/L — SIGNIFICANT CHANGE UP (ref 7–14)
AST SERPL-CCNC: 8 U/L — SIGNIFICANT CHANGE UP (ref 4–40)
BASE EXCESS BLDA CALC-SCNC: 0.4 MMOL/L — SIGNIFICANT CHANGE UP (ref -2–2)
BASOPHILS # BLD AUTO: 0.04 K/UL — SIGNIFICANT CHANGE UP (ref 0–0.2)
BASOPHILS NFR BLD AUTO: 0.3 % — SIGNIFICANT CHANGE UP (ref 0–2)
BILIRUB SERPL-MCNC: 0.3 MG/DL — SIGNIFICANT CHANGE UP (ref 0.2–1.2)
BLOOD GAS ARTERIAL - LYTES,HGB,ICA,LACT RESULT: SIGNIFICANT CHANGE UP
BUN SERPL-MCNC: 61 MG/DL — HIGH (ref 7–23)
CALCIUM SERPL-MCNC: 8.6 MG/DL — SIGNIFICANT CHANGE UP (ref 8.4–10.5)
CHLORIDE SERPL-SCNC: 105 MMOL/L — SIGNIFICANT CHANGE UP (ref 98–107)
CO2 SERPL-SCNC: 23 MMOL/L — SIGNIFICANT CHANGE UP (ref 22–31)
CREAT SERPL-MCNC: 1.37 MG/DL — HIGH (ref 0.5–1.3)
CULTURE RESULTS: SIGNIFICANT CHANGE UP
EOSINOPHIL # BLD AUTO: 0.02 K/UL — SIGNIFICANT CHANGE UP (ref 0–0.5)
EOSINOPHIL NFR BLD AUTO: 0.1 % — SIGNIFICANT CHANGE UP (ref 0–6)
GLUCOSE BLDC GLUCOMTR-MCNC: 261 MG/DL — HIGH (ref 70–99)
GLUCOSE BLDC GLUCOMTR-MCNC: 274 MG/DL — HIGH (ref 70–99)
GLUCOSE BLDC GLUCOMTR-MCNC: 287 MG/DL — HIGH (ref 70–99)
GLUCOSE BLDC GLUCOMTR-MCNC: 306 MG/DL — HIGH (ref 70–99)
GLUCOSE BLDC GLUCOMTR-MCNC: 355 MG/DL — HIGH (ref 70–99)
GLUCOSE SERPL-MCNC: 345 MG/DL — HIGH (ref 70–99)
HCO3 BLDA-SCNC: 25 MMOL/L — SIGNIFICANT CHANGE UP (ref 22–26)
HCT VFR BLD CALC: 35.3 % — LOW (ref 39–50)
HGB BLD-MCNC: 10.9 G/DL — LOW (ref 13–17)
IANC: 10.17 K/UL — HIGH (ref 1.5–8.5)
IMM GRANULOCYTES NFR BLD AUTO: 4.6 % — HIGH (ref 0–1.5)
LYMPHOCYTES # BLD AUTO: 1.44 K/UL — SIGNIFICANT CHANGE UP (ref 1–3.3)
LYMPHOCYTES # BLD AUTO: 10.1 % — LOW (ref 13–44)
MAGNESIUM SERPL-MCNC: 3 MG/DL — HIGH (ref 1.6–2.6)
MCHC RBC-ENTMCNC: 26 PG — LOW (ref 27–34)
MCHC RBC-ENTMCNC: 30.9 GM/DL — LOW (ref 32–36)
MCV RBC AUTO: 84.2 FL — SIGNIFICANT CHANGE UP (ref 80–100)
METHOD TYPE: SIGNIFICANT CHANGE UP
MONOCYTES # BLD AUTO: 1.95 K/UL — HIGH (ref 0–0.9)
MONOCYTES NFR BLD AUTO: 13.7 % — SIGNIFICANT CHANGE UP (ref 2–14)
NEUTROPHILS # BLD AUTO: 10.17 K/UL — HIGH (ref 1.8–7.4)
NEUTROPHILS NFR BLD AUTO: 71.2 % — SIGNIFICANT CHANGE UP (ref 43–77)
NRBC # BLD: 0 /100 WBCS — SIGNIFICANT CHANGE UP
NRBC # FLD: 0 K/UL — SIGNIFICANT CHANGE UP
ORGANISM # SPEC MICROSCOPIC CNT: SIGNIFICANT CHANGE UP
ORGANISM # SPEC MICROSCOPIC CNT: SIGNIFICANT CHANGE UP
PCO2 BLDA: 34 MMHG — LOW (ref 35–48)
PH BLDA: 7.46 — HIGH (ref 7.35–7.45)
PHOSPHATE SERPL-MCNC: 4 MG/DL — SIGNIFICANT CHANGE UP (ref 2.5–4.5)
PLATELET # BLD AUTO: 452 K/UL — HIGH (ref 150–400)
PO2 BLDA: 92 MMHG — SIGNIFICANT CHANGE UP (ref 83–108)
POTASSIUM SERPL-MCNC: 4.8 MMOL/L — SIGNIFICANT CHANGE UP (ref 3.5–5.3)
POTASSIUM SERPL-SCNC: 4.8 MMOL/L — SIGNIFICANT CHANGE UP (ref 3.5–5.3)
PROT SERPL-MCNC: 6.3 G/DL — SIGNIFICANT CHANGE UP (ref 6–8.3)
RBC # BLD: 4.19 M/UL — LOW (ref 4.2–5.8)
RBC # FLD: 14.4 % — SIGNIFICANT CHANGE UP (ref 10.3–14.5)
SAO2 % BLDA: 97.3 % — SIGNIFICANT CHANGE UP (ref 95–99)
SODIUM SERPL-SCNC: 141 MMOL/L — SIGNIFICANT CHANGE UP (ref 135–145)
SPECIMEN SOURCE: SIGNIFICANT CHANGE UP
TRIGL SERPL-MCNC: 571 MG/DL — HIGH
WBC # BLD: 14.27 K/UL — HIGH (ref 3.8–10.5)
WBC # FLD AUTO: 14.27 K/UL — HIGH (ref 3.8–10.5)

## 2021-01-29 PROCEDURE — 99233 SBSQ HOSP IP/OBS HIGH 50: CPT

## 2021-01-29 RX ORDER — HALOPERIDOL DECANOATE 100 MG/ML
5 INJECTION INTRAMUSCULAR EVERY 6 HOURS
Refills: 0 | Status: DISCONTINUED | OUTPATIENT
Start: 2021-01-29 | End: 2021-01-29

## 2021-01-29 RX ORDER — HYDROMORPHONE HYDROCHLORIDE 2 MG/ML
0.5 INJECTION INTRAMUSCULAR; INTRAVENOUS; SUBCUTANEOUS
Refills: 0 | Status: DISCONTINUED | OUTPATIENT
Start: 2021-01-29 | End: 2021-01-29

## 2021-01-29 RX ORDER — HYDRALAZINE HCL 50 MG
10 TABLET ORAL ONCE
Refills: 0 | Status: COMPLETED | OUTPATIENT
Start: 2021-01-29 | End: 2021-01-29

## 2021-01-29 RX ORDER — HYDROMORPHONE HYDROCHLORIDE 2 MG/ML
0.5 INJECTION INTRAMUSCULAR; INTRAVENOUS; SUBCUTANEOUS
Refills: 0 | Status: DISCONTINUED | OUTPATIENT
Start: 2021-01-29 | End: 2021-01-31

## 2021-01-29 RX ORDER — HYDRALAZINE HCL 50 MG
10 TABLET ORAL EVERY 6 HOURS
Refills: 0 | Status: DISCONTINUED | OUTPATIENT
Start: 2021-01-29 | End: 2021-02-10

## 2021-01-29 RX ORDER — DEXAMETHASONE 0.5 MG/5ML
6 ELIXIR ORAL ONCE
Refills: 0 | Status: DISCONTINUED | OUTPATIENT
Start: 2021-01-30 | End: 2021-01-31

## 2021-01-29 RX ORDER — HUMAN INSULIN 100 [IU]/ML
55 INJECTION, SUSPENSION SUBCUTANEOUS EVERY 6 HOURS
Refills: 0 | Status: DISCONTINUED | OUTPATIENT
Start: 2021-01-29 | End: 2021-01-31

## 2021-01-29 RX ORDER — HALOPERIDOL DECANOATE 100 MG/ML
5 INJECTION INTRAMUSCULAR EVERY 6 HOURS
Refills: 0 | Status: DISCONTINUED | OUTPATIENT
Start: 2021-01-29 | End: 2021-02-04

## 2021-01-29 RX ADMIN — Medication 1 APPLICATION(S): at 11:36

## 2021-01-29 RX ADMIN — AMPICILLIN SODIUM AND SULBACTAM SODIUM 200 GRAM(S): 250; 125 INJECTION, POWDER, FOR SUSPENSION INTRAMUSCULAR; INTRAVENOUS at 18:23

## 2021-01-29 RX ADMIN — AMPICILLIN SODIUM AND SULBACTAM SODIUM 200 GRAM(S): 250; 125 INJECTION, POWDER, FOR SUSPENSION INTRAMUSCULAR; INTRAVENOUS at 11:35

## 2021-01-29 RX ADMIN — Medication 6 MILLIGRAM(S): at 08:20

## 2021-01-29 RX ADMIN — Medication 145 MILLIGRAM(S): at 11:37

## 2021-01-29 RX ADMIN — Medication 8: at 06:23

## 2021-01-29 RX ADMIN — Medication 6: at 23:43

## 2021-01-29 RX ADMIN — ENOXAPARIN SODIUM 40 MILLIGRAM(S): 100 INJECTION SUBCUTANEOUS at 11:36

## 2021-01-29 RX ADMIN — FENTANYL CITRATE 2.48 MICROGRAM(S)/KG/HR: 50 INJECTION INTRAVENOUS at 08:35

## 2021-01-29 RX ADMIN — PROPOFOL 15.5 MICROGRAM(S)/KG/MIN: 10 INJECTION, EMULSION INTRAVENOUS at 08:24

## 2021-01-29 RX ADMIN — HUMAN INSULIN 55 UNIT(S): 100 INJECTION, SUSPENSION SUBCUTANEOUS at 11:27

## 2021-01-29 RX ADMIN — CHLORHEXIDINE GLUCONATE 15 MILLILITER(S): 213 SOLUTION TOPICAL at 18:24

## 2021-01-29 RX ADMIN — CHLORHEXIDINE GLUCONATE 15 MILLILITER(S): 213 SOLUTION TOPICAL at 06:15

## 2021-01-29 RX ADMIN — Medication 6: at 11:27

## 2021-01-29 RX ADMIN — MIDAZOLAM HYDROCHLORIDE 1.65 MG/KG/HR: 1 INJECTION, SOLUTION INTRAMUSCULAR; INTRAVENOUS at 08:35

## 2021-01-29 RX ADMIN — CLOPIDOGREL BISULFATE 75 MILLIGRAM(S): 75 TABLET, FILM COATED ORAL at 11:37

## 2021-01-29 RX ADMIN — PROPOFOL 15.5 MICROGRAM(S)/KG/MIN: 10 INJECTION, EMULSION INTRAVENOUS at 22:33

## 2021-01-29 RX ADMIN — HUMAN INSULIN 55 UNIT(S): 100 INJECTION, SUSPENSION SUBCUTANEOUS at 18:27

## 2021-01-29 RX ADMIN — SIMVASTATIN 20 MILLIGRAM(S): 20 TABLET, FILM COATED ORAL at 22:33

## 2021-01-29 RX ADMIN — PROPOFOL 15.5 MICROGRAM(S)/KG/MIN: 10 INJECTION, EMULSION INTRAVENOUS at 18:24

## 2021-01-29 RX ADMIN — AMPICILLIN SODIUM AND SULBACTAM SODIUM 200 GRAM(S): 250; 125 INJECTION, POWDER, FOR SUSPENSION INTRAMUSCULAR; INTRAVENOUS at 08:20

## 2021-01-29 RX ADMIN — AMPICILLIN SODIUM AND SULBACTAM SODIUM 200 GRAM(S): 250; 125 INJECTION, POWDER, FOR SUSPENSION INTRAMUSCULAR; INTRAVENOUS at 01:08

## 2021-01-29 RX ADMIN — Medication 10 MILLIGRAM(S): at 22:39

## 2021-01-29 RX ADMIN — TAMSULOSIN HYDROCHLORIDE 0.4 MILLIGRAM(S): 0.4 CAPSULE ORAL at 22:33

## 2021-01-29 RX ADMIN — HUMAN INSULIN 50 UNIT(S): 100 INJECTION, SUSPENSION SUBCUTANEOUS at 01:01

## 2021-01-29 RX ADMIN — HUMAN INSULIN 55 UNIT(S): 100 INJECTION, SUSPENSION SUBCUTANEOUS at 23:44

## 2021-01-29 RX ADMIN — CHLORHEXIDINE GLUCONATE 1 APPLICATION(S): 213 SOLUTION TOPICAL at 06:15

## 2021-01-29 RX ADMIN — Medication 81 MILLIGRAM(S): at 11:37

## 2021-01-29 RX ADMIN — HUMAN INSULIN 50 UNIT(S): 100 INJECTION, SUSPENSION SUBCUTANEOUS at 06:23

## 2021-01-29 RX ADMIN — HALOPERIDOL DECANOATE 5 MILLIGRAM(S): 100 INJECTION INTRAMUSCULAR at 18:35

## 2021-01-29 RX ADMIN — Medication 6: at 18:25

## 2021-01-29 RX ADMIN — Medication 10: at 01:02

## 2021-01-29 RX ADMIN — Medication 10 MILLIGRAM(S): at 23:57

## 2021-01-29 RX ADMIN — PROPOFOL 15.5 MICROGRAM(S)/KG/MIN: 10 INJECTION, EMULSION INTRAVENOUS at 13:19

## 2021-01-29 NOTE — PROGRESS NOTE ADULT - ATTENDING COMMENTS
Dr. Loya (Attending Physician)  N - Deeply sedated, Will dc versed  P - Will attempt to wean vent if   C - CAD s/p stents on aspirin and plavix  GI - TFS at goal, on ppi, having BMs   - ckd stage 3, Cr 1.3 today, UO 80 mL/hr, -500 mL  H - lovenox for ppx, aspirin/plavix for stents  ID - unasyn for 7 day course, procalcitonin low yesterday  E - NPH increase to 55 units q6, dexamethasone to complete tomorrowc

## 2021-01-29 NOTE — PROGRESS NOTE ADULT - SUBJECTIVE AND OBJECTIVE BOX
History:  Patient is intubated and sedated.        MEDICATIONS  (STANDING):  ampicillin/sulbactam  IVPB 3 Gram(s) IV Intermittent every 6 hours  aspirin  chewable 81 milliGRAM(s) Oral daily  chlorhexidine 0.12% Liquid 15 milliLiter(s) Oral Mucosa every 12 hours  chlorhexidine 4% Liquid 1 Application(s) Topical <User Schedule>  clopidogrel Tablet 75 milliGRAM(s) Oral daily  dextrose 50% Injectable 25 Gram(s) IV Push once  dextrose 50% Injectable 12.5 Gram(s) IV Push once  enoxaparin Injectable 40 milliGRAM(s) SubCutaneous daily  fenofibrate Tablet 145 milliGRAM(s) Oral daily  fentaNYL   Infusion..... 1.501 MICROgram(s)/kG/Hr (2.48 mL/Hr) IV Continuous <Continuous>  glucagon  Injectable 1 milliGRAM(s) IntraMuscular once  insulin lispro (ADMELOG) corrective regimen sliding scale   SubCutaneous every 6 hours  insulin NPH human recombinant 55 Unit(s) SubCutaneous every 6 hours  norepinephrine Infusion 0.05 MICROgram(s)/kG/Min (3.87 mL/Hr) IV Continuous <Continuous>  petrolatum Ophthalmic Ointment 1 Application(s) Both EYES daily  propofol Infusion 31.275 MICROgram(s)/kG/Min (15.5 mL/Hr) IV Continuous <Continuous>  simvastatin 20 milliGRAM(s) Oral at bedtime  tamsulosin 0.4 milliGRAM(s) Oral at bedtime    MEDICATIONS  (PRN):      Allergies  No Known Allergies    Intolerances      Review of Systems:  Constitutional: No fever  Eyes: No blurry vision  Neuro: No tremors  HEENT: No pain  Cardiovascular: No chest pain, palpitations  Respiratory: No SOB, no cough  GI: No nausea, vomiting, abdominal pain  : No dysuria  Skin: no rash  Psych: no depression  Endocrine: no polyuria, polydipsia      ALL OTHER SYSTEMS REVIEWED AND NEGATIVE    UNABLE TO OBTAIN    PHYSICAL EXAM:  VITALS: T(C): 38.1 (01-29-21 @ 08:00)  T(F): 100.5 (01-29-21 @ 08:00), Max: 100.5 (01-29-21 @ 08:00)  HR: 66 (01-29-21 @ 11:09) (54 - 66)  BP: --  RR:  (26 - 28)  SpO2:  (93% - 98%)    GENERAL: Intubated and sedated   EYES: No proptosis, no lid lag, anicteric  HEENT:  Atraumatic, Normocephalic, moist mucous membranes  THYROID: Normal size, no palpable nodules  RESPIRATORY: mechanical breath sounds   CARDIOVASCULAR: Regular rate and rhythm; No murmurs; no peripheral edema  GI: Soft, nontender, non distended, normal bowel sounds  SKIN: Dry, intact, No rashes or lesions  PSYCH: AAO x 0       POCT Blood Glucose.: 261 mg/dL (01-29-21 @ 10:48)  POCT Blood Glucose.: 306 mg/dL (01-29-21 @ 06:05)  POCT Blood Glucose.: 355 mg/dL (01-29-21 @ 00:42)  POCT Blood Glucose.: 369 mg/dL (01-28-21 @ 16:37)  POCT Blood Glucose.: 357 mg/dL (01-28-21 @ 11:04)  POCT Blood Glucose.: 298 mg/dL (01-28-21 @ 09:07)  POCT Blood Glucose.: 309 mg/dL (01-28-21 @ 06:04)  POCT Blood Glucose.: 218 mg/dL (01-27-21 @ 23:26)  POCT Blood Glucose.: 323 mg/dL (01-27-21 @ 17:03)  POCT Blood Glucose.: 280 mg/dL (01-27-21 @ 11:16)  POCT Blood Glucose.: 237 mg/dL (01-27-21 @ 05:22)  POCT Blood Glucose.: 282 mg/dL (01-27-21 @ 00:54)  POCT Blood Glucose.: 304 mg/dL (01-26-21 @ 22:38)  POCT Blood Glucose.: 330 mg/dL (01-26-21 @ 17:39)      01-29    141  |  105  |  61<H>  ----------------------------<  345<H>  4.8   |  23  |  1.37<H>    EGFR if : 59<L>  EGFR if non : 51<L>    Ca    8.6      01-29  Mg     3.0     01-29  Phos  4.0     01-29    TPro  6.3  /  Alb  2.6<L>  /  TBili  0.3  /  DBili  x   /  AST  8   /  ALT  9   /  AlkPhos  58  01-29          Thyroid Function Tests:                         History:  Patient is intubated and sedated.        MEDICATIONS  (STANDING):  ampicillin/sulbactam  IVPB 3 Gram(s) IV Intermittent every 6 hours  aspirin  chewable 81 milliGRAM(s) Oral daily  chlorhexidine 0.12% Liquid 15 milliLiter(s) Oral Mucosa every 12 hours  chlorhexidine 4% Liquid 1 Application(s) Topical <User Schedule>  clopidogrel Tablet 75 milliGRAM(s) Oral daily  dextrose 50% Injectable 25 Gram(s) IV Push once  dextrose 50% Injectable 12.5 Gram(s) IV Push once  enoxaparin Injectable 40 milliGRAM(s) SubCutaneous daily  fenofibrate Tablet 145 milliGRAM(s) Oral daily  fentaNYL   Infusion..... 1.501 MICROgram(s)/kG/Hr (2.48 mL/Hr) IV Continuous <Continuous>  glucagon  Injectable 1 milliGRAM(s) IntraMuscular once  insulin lispro (ADMELOG) corrective regimen sliding scale   SubCutaneous every 6 hours  insulin NPH human recombinant 55 Unit(s) SubCutaneous every 6 hours  norepinephrine Infusion 0.05 MICROgram(s)/kG/Min (3.87 mL/Hr) IV Continuous <Continuous>  petrolatum Ophthalmic Ointment 1 Application(s) Both EYES daily  propofol Infusion 31.275 MICROgram(s)/kG/Min (15.5 mL/Hr) IV Continuous <Continuous>  simvastatin 20 milliGRAM(s) Oral at bedtime  tamsulosin 0.4 milliGRAM(s) Oral at bedtime    MEDICATIONS  (PRN):      Allergies  No Known Allergies    Intolerances      Review of Systems:  UNABLE TO OBTAIN    PHYSICAL EXAM:  VITALS: T(C): 38.1 (01-29-21 @ 08:00)  T(F): 100.5 (01-29-21 @ 08:00), Max: 100.5 (01-29-21 @ 08:00)  HR: 66 (01-29-21 @ 11:09) (54 - 66)  BP: --  RR:  (26 - 28)  SpO2:  (93% - 98%)    GENERAL: Intubated and sedated   EYES: No proptosis, no lid lag, anicteric  HEENT:  Atraumatic, Normocephalic, moist mucous membranes  THYROID: Normal size, no palpable nodules  RESPIRATORY: mechanical breath sounds   CARDIOVASCULAR: Regular rate and rhythm; No murmurs; no peripheral edema  GI: Soft, nontender, non distended, normal bowel sounds  SKIN: Dry, intact, No rashes or lesions  PSYCH: AAO x 0       POCT Blood Glucose.: 261 mg/dL (01-29-21 @ 10:48)  POCT Blood Glucose.: 306 mg/dL (01-29-21 @ 06:05)  POCT Blood Glucose.: 355 mg/dL (01-29-21 @ 00:42)  POCT Blood Glucose.: 369 mg/dL (01-28-21 @ 16:37)  POCT Blood Glucose.: 357 mg/dL (01-28-21 @ 11:04)  POCT Blood Glucose.: 298 mg/dL (01-28-21 @ 09:07)  POCT Blood Glucose.: 309 mg/dL (01-28-21 @ 06:04)  POCT Blood Glucose.: 218 mg/dL (01-27-21 @ 23:26)  POCT Blood Glucose.: 323 mg/dL (01-27-21 @ 17:03)  POCT Blood Glucose.: 280 mg/dL (01-27-21 @ 11:16)  POCT Blood Glucose.: 237 mg/dL (01-27-21 @ 05:22)  POCT Blood Glucose.: 282 mg/dL (01-27-21 @ 00:54)  POCT Blood Glucose.: 304 mg/dL (01-26-21 @ 22:38)  POCT Blood Glucose.: 330 mg/dL (01-26-21 @ 17:39)      01-29    141  |  105  |  61<H>  ----------------------------<  345<H>  4.8   |  23  |  1.37<H>    EGFR if : 59<L>  EGFR if non : 51<L>    Ca    8.6      01-29  Mg     3.0     01-29  Phos  4.0     01-29    TPro  6.3  /  Alb  2.6<L>  /  TBili  0.3  /  DBili  x   /  AST  8   /  ALT  9   /  AlkPhos  58  01-29          Thyroid Function Tests:

## 2021-01-29 NOTE — PROGRESS NOTE ADULT - ASSESSMENT
70M w/ HTN, HLD, CAD a/w hypoxic respiratory failure 2/2 COVID w/ superimposed bacterial PNA.     Neuro:  -sedated with propofol, midazolam, fentanyl, wean as tolerated   -off paralysis    Cardio:  -weaning off levo   -CAD s/p stents in 2015?; c/w aspirin and plavix    Respiratory:  -Acute hypoxic respiratory failure 2/2 COVID, progressed to severe ARDs; atelectasis noted at bilateral lung bases on pocus; will consider proning if P/F ratio worsens, currently stable/improving, will hold off proning,  -Vent settings: 28/420/10/50  -Trend ABG     ID:  -COVID-19 pneumonia; c/w dexamethasone for ten-day course (1/22-), remdesivir stopped  -sputum 1/23 w/ staph aureus and h.influenzae. c/w Unasyn 3G q6h     GI:  -OG tube in place, c/w tube feeds  -Patient w/o BMs, c/w bowel regimen      Renal:  -Sowald catheter placed; monitor Is&Os, monitor daily BMPs.  -OLGA improving    Endo:  -DMII A1c 9.6%, reportedly well controlled at home on 68U basaglar, Novolog 15tid.   -On admission, noted to be in DKA given glucose >200, bicarbonate <18, and pH <7.25; now resolved s/p insulin gtt. Patient transitioned to NPH 40 units q6h today  -daily bmp, q1h fingerstick glucose   -Endo cs    Hematologic:  -DVT prophylaxis with lovenox 40 subcutaneous daily; no anemia, leukocytosis, leukopenia, or thrombocytopenia     Skin:  -No known decubiti at the time of admission   -Right radial arterial line and LIJ venous catheter in place     Ethics:  Full code  Contact wife for updates

## 2021-01-29 NOTE — PROGRESS NOTE ADULT - SUBJECTIVE AND OBJECTIVE BOX
COVID 19 ICU Note    HISTORY  70y Male with COVID-19(+) pneumonia requiring intubation.    24 HOUR EVENTS: supinated in AM, better ventilation oxygenation profile, otherwise remains afebrile and stable, continues to be hyperglycemic in 300s, endocrinology to help     SUBJECTIVE/ROS: Due to intubation with sedation, subjective information was not able to be obtained from the patient. History was obtained, to the extent possible, from review of the chart and collateral sources of information.    NEURO  RASS:  Exam: sedated, minimally withdraws to pain in all four extremities, does not follow commands  Meds: fentaNYL   Infusion..... 1.501 MICROgram(s)/kG/Hr IV Continuous <Continuous>  midazolam Infusion 0.02 mG/kG/Hr IV Continuous <Continuous>  propofol Infusion 31.275 MICROgram(s)/kG/Min IV Continuous <Continuous>    Adequacy of sedation and pain control has been assessed and adjusted.    RESPIRATORY  RR: 26 (26 - 28)  SpO2: 96% (93% - 99%)  Exam: coarse rhonchi in all lung fields  Mechanical Ventilation: Mode: AC/ CMV (Assist Control/ Continuous Mandatory Ventilation), RR (machine): 28, TV (machine): 420, FiO2: 40, PEEP: 10  ABG - ( 29 Jan 2021 03:44 )  pH: 7.42  /  pCO2: 38    /  pO2: 77    / HCO3: 24    / Base Excess: -0.1  /  SaO2: 94.8    Lactate: x                PaO2 to FiO2 ratio: 154  Peak Pressure:  Plateau Pressure:  Patient not a candidate for a SBT at this time due to ventilator requirements    CARDIOVASCULAR  HR: 66 (54 - 66)  ABP: 127/56 (113/53 - 137/67)  ABP(mean): 75 (69 - 79)  Exam: regualr rate and rhythm, S1S2  Cardiac Rhythm: sinus  Most recent QTc:   Meds: norepinephrine Infusion (3.87 mL/Hr)  tamsulosin      GI/NUTRITION  Diet: Diet, NPO with Tube Feed:   Tube Feeding Modality: Orogastric  Glucerna 1.5 Toby (GLUCERNA1.5RTH)  Total Volume for 24 Hours (mL): 960  Continuous  Starting Tube Feed Rate mL per Hour: 10  Increase Tube Feed Rate by (mL): 10     Every 3 hours  Until Goal Tube Feed Rate (mL per Hour): 40  Tube Feed Duration (in Hours): 24  Tube Feed Start Time: 08:30  No Carb Prosource (1pkg = 15gms Protein)     Qty per Day:  2 packs (01-29-21 @ 08:10)    Most recent bowel movement:  Meds: polyethylene glycol 3350 17 Gram(s) Oral two times a day  senna Syrup 10 milliLiter(s) Oral two times a day  sodium chloride 0.9% lock flush 10 milliLiter(s) IV Push every 1 hour PRN Pre/post blood products, medications, blood draw, and to maintain line patency      GENITOURINARY  I&O's Detail    01-28 @ 07:01 - 01-29 @ 07:00  --------------------------------------------------------  IN:    Cisatracurium: 59.4 mL    Enteral Tube Flush: 60 mL    FentaNYL: 47.5 mL    Glucerna: 655 mL    IV PiggyBack: 200 mL    Midazolam: 156.2 mL    Norepinephrine: 25 mL    Propofol: 474.8 mL  Total IN: 1677.9 mL    OUT:    Indwelling Catheter - Urethral (mL): 2150 mL  Total OUT: 2150 mL    Total NET: -472.1 mL      01-29 @ 07:01 - 01-29 @ 08:36  --------------------------------------------------------  IN:    FentaNYL: 5 mL    Glucerna: 70 mL    Midazolam: 16.6 mL    Norepinephrine: 3 mL    Propofol: 49.6 mL  Total IN: 144.2 mL    OUT:    Indwelling Catheter - Urethral (mL): 260 mL  Total OUT: 260 mL    Total NET: -115.8 mL          01-29    141  |  105  |  61<H>  ----------------------------<  345<H>  4.8   |  23  |  1.37<H>    Ca    8.6      29 Jan 2021 03:44  Phos  4.0     01-29  Mg     3.0     01-29    TPro  6.3  /  Alb  2.6<L>  /  TBili  0.3  /  DBili  x   /  AST  8   /  ALT  9   /  AlkPhos  58  01-29    [x] Oswald catheter, indication: urine output monitoring in critically ill  Meds: sodium chloride 0.9% lock flush 10 milliLiter(s) IV Push every 1 hour PRN Pre/post blood products, medications, blood draw, and to maintain line patency        HEMATOLOGIC  Meds: aspirin  chewable 81 milliGRAM(s) Oral daily  clopidogrel Tablet 75 milliGRAM(s) Oral daily  enoxaparin Injectable 40 milliGRAM(s) SubCutaneous daily                          10.9   14.27 )-----------( 452      ( 29 Jan 2021 03:44 )             35.3           INFECTIOUS DISEASES  T(C): 38.1, Max: 38.1 (01-29-21 @ 08:00)  WBC Count: 14.27 (01-29-21 @ 03:44)  WBC Count: 14.50 (01-28-21 @ 04:59)    Recent Cultures:  Specimen Source: .Sputum Sputum trap, 01-27 @ 10:07; Results   Numerous Staphylococcus aureus; Gram Stain:   No polymorphonuclear leukocytes per low power field  No Squamous epithelial cells per low power field  Moderate Gram Positive Cocci in Clusters per oil power field; Organism: --  Specimen Source: .Blood Blood, 01-27 @ 08:26; Results   No growth to date.; Gram Stain: --; Organism: --  Specimen Source: .Blood Blood-Peripheral, 01-22 @ 21:17; Results   No Growth Final; Gram Stain: --; Organism: --  Specimen Source: .Sputum Sputum, 01-22 @ 15:00; Results   Moderate Staphylococcus aureus  Moderate Haemophilus influenzae "Susceptibilities not performed"  Normal Respiratory Susan present; Gram Stain:   Few polymorphonuclear leukocytes per low power field  Moderate Gram Positive Cocci per oil power field; Organism: Staphylococcus aureus    Meds: ampicillin/sulbactam  IVPB 3 Gram(s) IV Intermittent every 6 hours    Procalcitonin, Serum: 0.29 ng/mL (01-28-21 @ 04:59)      ENDOCRINE  Fingersticks: 306, 355, 369, 357, 298  Meds: dexAMETHasone  Injectable 6 milliGRAM(s) IV Push daily  dextrose 40% Gel 15 Gram(s) Oral once  dextrose 50% Injectable 25 Gram(s) IV Push once  dextrose 50% Injectable 12.5 Gram(s) IV Push once  dextrose 50% Injectable 25 Gram(s) IV Push once  fenofibrate Tablet 145 milliGRAM(s) Oral daily  glucagon  Injectable 1 milliGRAM(s) IntraMuscular once  glucagon  Injectable 1 milliGRAM(s) IntraMuscular once  insulin lispro (ADMELOG) corrective regimen sliding scale   SubCutaneous every 6 hours  insulin NPH human recombinant 50 Unit(s) SubCutaneous every 6 hours  simvastatin 20 milliGRAM(s) Oral at bedtime      ACCESS DEVICES:  [x] Peripheral IV  [x] Central Venous Line	[ ] R	[ ] L	[ ] IJ	[ ] Fem [ ] SC		Placed:   [x] Arterial Line			[ ] R	[ ] L	[ ] Fem [ ] Rad [ ] Ax	Placed:   [ ] Dilcialey HD Access		[ ] R [ ] L  [ ] IJ  [ ] Fem			Placed:  [x] Urinary Catheter, Date Placed:   Necessity of urinary, arterial, and venous catheters discussed.    CODE STATUS:     IMAGING:

## 2021-01-29 NOTE — PROGRESS NOTE ADULT - PROBLEM SELECTOR PLAN 1
- A1c 9.6%  - now on continuous 24 hour tube feeds  - adjust NPH to 55 units q6 and hold if tube feeds are held  - moderate correction scale q6  - will follow  - for discharge: plan to dc on insulin, recs TBD.

## 2021-01-30 LAB
ANION GAP SERPL CALC-SCNC: 13 MMOL/L — SIGNIFICANT CHANGE UP (ref 7–14)
BASOPHILS # BLD AUTO: 0.02 K/UL — SIGNIFICANT CHANGE UP (ref 0–0.2)
BASOPHILS NFR BLD AUTO: 0.2 % — SIGNIFICANT CHANGE UP (ref 0–2)
BLOOD GAS ARTERIAL - LYTES,HGB,ICA,LACT RESULT: SIGNIFICANT CHANGE UP
BLOOD GAS ARTERIAL - LYTES,HGB,ICA,LACT RESULT: SIGNIFICANT CHANGE UP
BUN SERPL-MCNC: 59 MG/DL — HIGH (ref 7–23)
CALCIUM SERPL-MCNC: 8.7 MG/DL — SIGNIFICANT CHANGE UP (ref 8.4–10.5)
CHLORIDE SERPL-SCNC: 107 MMOL/L — SIGNIFICANT CHANGE UP (ref 98–107)
CO2 SERPL-SCNC: 22 MMOL/L — SIGNIFICANT CHANGE UP (ref 22–31)
CREAT SERPL-MCNC: 1.11 MG/DL — SIGNIFICANT CHANGE UP (ref 0.5–1.3)
EOSINOPHIL # BLD AUTO: 0 K/UL — SIGNIFICANT CHANGE UP (ref 0–0.5)
EOSINOPHIL NFR BLD AUTO: 0 % — SIGNIFICANT CHANGE UP (ref 0–6)
GLUCOSE BLDC GLUCOMTR-MCNC: 233 MG/DL — HIGH (ref 70–99)
GLUCOSE BLDC GLUCOMTR-MCNC: 62 MG/DL — LOW (ref 70–99)
GLUCOSE BLDC GLUCOMTR-MCNC: 76 MG/DL — SIGNIFICANT CHANGE UP (ref 70–99)
GLUCOSE BLDC GLUCOMTR-MCNC: 77 MG/DL — SIGNIFICANT CHANGE UP (ref 70–99)
GLUCOSE BLDC GLUCOMTR-MCNC: 83 MG/DL — SIGNIFICANT CHANGE UP (ref 70–99)
GLUCOSE SERPL-MCNC: 296 MG/DL — HIGH (ref 70–99)
HCT VFR BLD CALC: 36.2 % — LOW (ref 39–50)
HGB BLD-MCNC: 11.3 G/DL — LOW (ref 13–17)
IANC: 6.73 K/UL — SIGNIFICANT CHANGE UP (ref 1.5–8.5)
IMM GRANULOCYTES NFR BLD AUTO: 4.1 % — HIGH (ref 0–1.5)
LYMPHOCYTES # BLD AUTO: 1.12 K/UL — SIGNIFICANT CHANGE UP (ref 1–3.3)
LYMPHOCYTES # BLD AUTO: 11.7 % — LOW (ref 13–44)
MAGNESIUM SERPL-MCNC: 2.9 MG/DL — HIGH (ref 1.6–2.6)
MCHC RBC-ENTMCNC: 26.2 PG — LOW (ref 27–34)
MCHC RBC-ENTMCNC: 31.2 GM/DL — LOW (ref 32–36)
MCV RBC AUTO: 84 FL — SIGNIFICANT CHANGE UP (ref 80–100)
MONOCYTES # BLD AUTO: 1.31 K/UL — HIGH (ref 0–0.9)
MONOCYTES NFR BLD AUTO: 13.7 % — SIGNIFICANT CHANGE UP (ref 2–14)
NEUTROPHILS # BLD AUTO: 6.73 K/UL — SIGNIFICANT CHANGE UP (ref 1.8–7.4)
NEUTROPHILS NFR BLD AUTO: 70.3 % — SIGNIFICANT CHANGE UP (ref 43–77)
NRBC # BLD: 0 /100 WBCS — SIGNIFICANT CHANGE UP
NRBC # FLD: 0 K/UL — SIGNIFICANT CHANGE UP
PHOSPHATE SERPL-MCNC: 4 MG/DL — SIGNIFICANT CHANGE UP (ref 2.5–4.5)
PLATELET # BLD AUTO: 421 K/UL — HIGH (ref 150–400)
POTASSIUM SERPL-MCNC: 4.1 MMOL/L — SIGNIFICANT CHANGE UP (ref 3.5–5.3)
POTASSIUM SERPL-SCNC: 4.1 MMOL/L — SIGNIFICANT CHANGE UP (ref 3.5–5.3)
RBC # BLD: 4.31 M/UL — SIGNIFICANT CHANGE UP (ref 4.2–5.8)
RBC # FLD: 14 % — SIGNIFICANT CHANGE UP (ref 10.3–14.5)
SODIUM SERPL-SCNC: 142 MMOL/L — SIGNIFICANT CHANGE UP (ref 135–145)
TRIGL SERPL-MCNC: 525 MG/DL — HIGH
WBC # BLD: 9.57 K/UL — SIGNIFICANT CHANGE UP (ref 3.8–10.5)
WBC # FLD AUTO: 9.57 K/UL — SIGNIFICANT CHANGE UP (ref 3.8–10.5)

## 2021-01-30 PROCEDURE — 99291 CRITICAL CARE FIRST HOUR: CPT

## 2021-01-30 RX ORDER — DEXMEDETOMIDINE HYDROCHLORIDE IN 0.9% SODIUM CHLORIDE 4 UG/ML
0.2 INJECTION INTRAVENOUS
Qty: 400 | Refills: 0 | Status: DISCONTINUED | OUTPATIENT
Start: 2021-01-30 | End: 2021-01-31

## 2021-01-30 RX ADMIN — CHLORHEXIDINE GLUCONATE 15 MILLILITER(S): 213 SOLUTION TOPICAL at 06:52

## 2021-01-30 RX ADMIN — PROPOFOL 15.5 MICROGRAM(S)/KG/MIN: 10 INJECTION, EMULSION INTRAVENOUS at 13:55

## 2021-01-30 RX ADMIN — SIMVASTATIN 20 MILLIGRAM(S): 20 TABLET, FILM COATED ORAL at 21:01

## 2021-01-30 RX ADMIN — AMPICILLIN SODIUM AND SULBACTAM SODIUM 200 GRAM(S): 250; 125 INJECTION, POWDER, FOR SUSPENSION INTRAMUSCULAR; INTRAVENOUS at 06:51

## 2021-01-30 RX ADMIN — CHLORHEXIDINE GLUCONATE 1 APPLICATION(S): 213 SOLUTION TOPICAL at 06:56

## 2021-01-30 RX ADMIN — DEXMEDETOMIDINE HYDROCHLORIDE IN 0.9% SODIUM CHLORIDE 4.13 MICROGRAM(S)/KG/HR: 4 INJECTION INTRAVENOUS at 16:48

## 2021-01-30 RX ADMIN — AMPICILLIN SODIUM AND SULBACTAM SODIUM 200 GRAM(S): 250; 125 INJECTION, POWDER, FOR SUSPENSION INTRAMUSCULAR; INTRAVENOUS at 23:44

## 2021-01-30 RX ADMIN — CLOPIDOGREL BISULFATE 75 MILLIGRAM(S): 75 TABLET, FILM COATED ORAL at 13:00

## 2021-01-30 RX ADMIN — AMPICILLIN SODIUM AND SULBACTAM SODIUM 200 GRAM(S): 250; 125 INJECTION, POWDER, FOR SUSPENSION INTRAMUSCULAR; INTRAVENOUS at 13:00

## 2021-01-30 RX ADMIN — AMPICILLIN SODIUM AND SULBACTAM SODIUM 200 GRAM(S): 250; 125 INJECTION, POWDER, FOR SUSPENSION INTRAMUSCULAR; INTRAVENOUS at 01:27

## 2021-01-30 RX ADMIN — Medication 1 APPLICATION(S): at 13:01

## 2021-01-30 RX ADMIN — AMPICILLIN SODIUM AND SULBACTAM SODIUM 200 GRAM(S): 250; 125 INJECTION, POWDER, FOR SUSPENSION INTRAMUSCULAR; INTRAVENOUS at 19:04

## 2021-01-30 RX ADMIN — HUMAN INSULIN 55 UNIT(S): 100 INJECTION, SUSPENSION SUBCUTANEOUS at 06:52

## 2021-01-30 RX ADMIN — TAMSULOSIN HYDROCHLORIDE 0.4 MILLIGRAM(S): 0.4 CAPSULE ORAL at 21:01

## 2021-01-30 RX ADMIN — HUMAN INSULIN 55 UNIT(S): 100 INJECTION, SUSPENSION SUBCUTANEOUS at 12:40

## 2021-01-30 RX ADMIN — Medication 145 MILLIGRAM(S): at 13:00

## 2021-01-30 RX ADMIN — ENOXAPARIN SODIUM 40 MILLIGRAM(S): 100 INJECTION SUBCUTANEOUS at 13:00

## 2021-01-30 RX ADMIN — CHLORHEXIDINE GLUCONATE 15 MILLILITER(S): 213 SOLUTION TOPICAL at 19:04

## 2021-01-30 RX ADMIN — PROPOFOL 15.5 MICROGRAM(S)/KG/MIN: 10 INJECTION, EMULSION INTRAVENOUS at 01:27

## 2021-01-30 RX ADMIN — Medication 4: at 06:56

## 2021-01-30 RX ADMIN — Medication 81 MILLIGRAM(S): at 13:39

## 2021-01-30 NOTE — CHART NOTE - NSCHARTNOTEFT_GEN_A_CORE
Diabetes follow up   Chart reviewed   Glucose above target of 140-180 mg/dL   If tube feeding remains continuous, would increase NPH to 60 units every 6 hours - HOLD if tube feeding is off   C/W Admelog MODERATE correctional scale every 6 hours   Endocrine following      MEDICATIONS  (STANDING):  ampicillin/sulbactam  IVPB 3 Gram(s) IV Intermittent every 6 hours  aspirin  chewable 81 milliGRAM(s) Oral daily  chlorhexidine 0.12% Liquid 15 milliLiter(s) Oral Mucosa every 12 hours  chlorhexidine 4% Liquid 1 Application(s) Topical <User Schedule>  clopidogrel Tablet 75 milliGRAM(s) Oral daily  dexAMETHasone  Injectable 6 milliGRAM(s) IV Push once  dextrose 50% Injectable 25 Gram(s) IV Push once  dextrose 50% Injectable 12.5 Gram(s) IV Push once  enoxaparin Injectable 40 milliGRAM(s) SubCutaneous daily  fenofibrate Tablet 145 milliGRAM(s) Oral daily  fentaNYL   Infusion..... 1.501 MICROgram(s)/kG/Hr (2.48 mL/Hr) IV Continuous <Continuous>  glucagon  Injectable 1 milliGRAM(s) IntraMuscular once  insulin lispro (ADMELOG) corrective regimen sliding scale   SubCutaneous every 6 hours  insulin NPH human recombinant 55 Unit(s) SubCutaneous every 6 hours  petrolatum Ophthalmic Ointment 1 Application(s) Both EYES daily  propofol Infusion 31.275 MICROgram(s)/kG/Min (15.5 mL/Hr) IV Continuous <Continuous>  simvastatin 20 milliGRAM(s) Oral at bedtime  tamsulosin 0.4 milliGRAM(s) Oral at bedtime    CAPILLARY BLOOD GLUCOSE  POCT Blood Glucose.: 233 mg/dL (30 Jan 2021 05:35)  POCT Blood Glucose.: 287 mg/dL (29 Jan 2021 23:13)  POCT Blood Glucose.: 274 mg/dL (29 Jan 2021 18:20)    Diet, NPO with Tube Feed:   Tube Feeding Modality: Orogastric  Glucerna 1.5 Toby (GLUCERNA1.5RTH)  Total Volume for 24 Hours (mL): 960  Continuous  Starting Tube Feed Rate {mL per Hour}: 10  Increase Tube Feed Rate by (mL): 10     Every 3 hours  Until Goal Tube Feed Rate (mL per Hour): 40  Tube Feed Duration (in Hours): 24  Tube Feed Start Time: 08:30  No Carb Prosource (1pkg = 15gms Protein)     Qty per Day:  2 packs (01-29-21 @ 08:10) [Active]

## 2021-01-30 NOTE — PROGRESS NOTE ADULT - ATTENDING COMMENTS
Dr. Loya (Attending Physician)  N - Deeply sedated, Will dc versed.  Elevated triglycerides- consider change from propofol to precedex  P - Wean vent as patient overventilated- rate decreased to 20 on rounds.  Will try and wean peep later today  C - CAD s/p stents on aspirin and plavix. Hydralazine prn for hypertension.  GI - TFS at goal, on ppi, having BMs   - ckd stage 3, Cr 1.1 today, UO adequate  H - lovenox for ppx, aspirin/plavix for stents  ID - unasyn for 7 day course, procalcitonin low yesterday (S. aureus in trach aspirate)  E - Continue NPH and follow blood sugers- discuss plan with endocrine as sugars are still elevated despite increase in NPH,  s/p dexamethasone N - Sedated on propofol.  Elevated triglycerides- consider change from propofol to precedex  P - Wean vent as patient overventilated- rate decreased to 20 on rounds.  Will try and wean peep later today  C - CAD s/p stents on aspirin and plavix. Hydralazine prn for hypertension.  GI - TFS at goal, on ppi, having BMs   - ckd stage 3, Cr 1.1 today, UO adequate  H - lovenox for ppx, aspirin/plavix for stents  ID - unasyn for 7 day course, procalcitonin low yesterday (S. aureus in trach aspirate)  E - Continue NPH and follow blood sugers- discuss plan with endocrine as sugars are still elevated despite increase in NPH,  s/p dexamethasone

## 2021-01-30 NOTE — PROGRESS NOTE ADULT - SUBJECTIVE AND OBJECTIVE BOX
ICU Daily PROGRESS NOTE  ===============================  HPI  70M w/ HTN, HLD, CAD a/w hypoxic respiratory failure 2/2 COVID w/ superimposed bacterial PNA.    Interval Events:  - NPH increased to 55, tube feeds continued,  - ventilator requirements significantly down, off levo, FiO2 down to 30   -Hypertensive overnight to 180s, hydralazine 10 x 2, 160s  - ETT advanced overnight by respiratory for decreased TV, good response      VITAL SIGNS, INS/OUTS (last 24 hours):  --------------------------------------------------------------------------------------  T(C): 35.9 (21 @ 23:00), Max: 38.2 (21 @ 12:00)  HR: 70 (21 @ 03:40) (50 - 80)  BP: 156/72 (21 @ 16:00) (156/72 - 156/72)  BP(mean): 98 (21 @ 16:00) (98 - 98)  ABP: 183/73 (21 @ 22:00) (127/56 - 183/73)  ABP(mean): 112 (21 @ 22:00) (75 - 112)  RR: 27 (21 @ 22:00) (27 - 28)  SpO2: 97% (21 @ 03:40) (92% - 99%)  CAPILLARY BLOOD GLUCOSE      POCT Blood Glucose.: 233 mg/dL (2021 05:35)  POCT Blood Glucose.: 287 mg/dL (2021 23:13)  POCT Blood Glucose.: 274 mg/dL (2021 18:20)  POCT Blood Glucose.: 261 mg/dL (2021 10:48)   N/A       @ 07:01  -   @ 07:00  --------------------------------------------------------  IN:    Enteral Tube Flush: 100 mL    FentaNYL: 26.7 mL    Glucerna: 750 mL    IV PiggyBack: 300 mL    Midazolam: 16.6 mL    Norepinephrine: 6.7 mL    Propofol: 344.7 mL  Total IN: 1544.7 mL    OUT:    Indwelling Catheter - Urethral (mL): 2020 mL    Rectal Tube (mL): 200 mL  Total OUT: 2220 mL    Total NET: -675.3 mL  --------------------------------------------------------------------------------------    EXAM  NEUROLOGY  Exam: Normal, NAD, alert, oriented x3, no focal deficits. PERRLA.   ***    RESPIRATORY  Exam: Lungs clear to auscultation, Normal expansion/effort.  ***  [] Tracheostomy   [] Intubated  Mechanical Ventilation: Mode: AC/ CMV (Assist Control/ Continuous Mandatory Ventilation), RR (machine): 28, TV (machine): 420, FiO2: 30, PEEP: 8, MAP: 17, PIP: 32    CARDIOVASCULAR  Exam: S1, S2.  Regular rate and rhythm ****    GI/NUTRITION  Exam: Abdomen soft, Non-tender, Non-distended.  Gastrostomy / Jejunostomy tube in place.  Nasogastric tube in place.  Colostomy / Ileostomy.  ***  Wound: ***  Current Diet:  ***    METABOLIC/FLUIDS/ELECTROLYTES      HEMATOLOGIC  [x] DVT Prophylaxis: aspirin  chewable 81 milliGRAM(s) Oral daily  clopidogrel Tablet 75 milliGRAM(s) Oral daily  enoxaparin Injectable 40 milliGRAM(s) SubCutaneous daily    Transfusions:	[] PRBC	[] Platelets		[] FFP	[] Cryoprecipitate    INFECTIOUS DISEASE  Antimicrobials/Immunologic Medications:  ampicillin/sulbactam  IVPB 3 Gram(s) IV Intermittent every 6 hours    Day# of  ***    VASCULAR  Exam: Extremities warm, pink, well-perfused.  ****    MUSCULOSKELETAL  Exam: All extremities moving spontaneously without limitations.  ***    SKIN  Exam: Good skin turgor, no skin breakdown.  ***    LABS  --------------------------------------------------------------------------------------  CBC ( @ 01:52)                              11.3<L>                         9.57    )----------------(  421<H>     70.3  % Neutrophils, 11.7<L>% Lymphocytes, ANC: 6.73                                36.2<L>  CBC ( @ 03:44)                              10.9<L>                         14.27<H>  )----------------(  452<H>     71.2  % Neutrophils, 10.1<L>% Lymphocytes, ANC: 10.17<H>                              35.3<L>    BMP ( @ 01:54)             142     |  107     |  59<H> 		Ca++ --      Ca 8.7                ---------------------------------( 296<H>		Mg 2.9<H>             4.1     |  22      |  1.11  			Ph 4.0     BMP ( @ 03:44)             141     |  105     |  61<H> 		Ca++ --      Ca 8.6                ---------------------------------( 345<H>		Mg 3.0<H>             4.8     |  23      |  1.37<H>			Ph 4.0       LFTs ( @ 03:44)      TPro 6.3 / Alb 2.6<L> / TBili 0.3 / DBili -- / AST 8 / ALT 9 / AlkPhos 58        ABG ( @ 01:52)     7.48<H> / 30<L> / 91 / 24 / -0.8 / 97.7%     Lactate:     ABG ( @ 11:29)     7.46<H> / 34<L> / 92 / 25 / 0.4 / 97.3%     Lactate:         Urinalysis ( @ 05:16):     Color: Light Yellow / Appearance: Clear / S.024 / pH: 6.0 / Gluc: >= 1000 mg/dL<!> / Ketones: Negative / Bili: Negative / Urobili: <2 mg/dL / Protein :Negative / Nitrites: Negative / Leuk.Est: Negative / RBC: 5<H> / WBC: 5 / Sq Epi:  / Non Sq Epi:  / Bacteria Occasional<!>       -> .Blood Blood Culture ( @ 08:26)     NG    NG    No growth to date.    -> .Sputum Sputum trap Culture ( @ 07:42)       No polymorphonuclear leukocytes per low power field  No Squamous epithelial cells per low power field  Moderate Gram Positive Cocci in Clusters per oil power field    Staphylococcus aureus    Numerous Staphylococcus aureus  Normal Respiratory Susan absent    -> .Blood Blood-Peripheral Culture ( @ 21:17)     NG    NG    No Growth Final    -> .Sputum Sputum Culture ( @ 15:00)       Few polymorphonuclear leukocytes per low power field  Moderate Gram Positive Cocci per oil power field    Staphylococcus aureus    Moderate Staphylococcus aureus  Moderate Haemophilus influenzae "Susceptibilities not performed"  Normal Respiratory Susan present  --------------------------------------------------------------------------------------    IMAGING STUDIES     ICU Daily PROGRESS NOTE  ===============================  HPI  70M w/ HTN, HLD, CAD a/w hypoxic respiratory failure 2/2 COVID w/ superimposed bacterial PNA.    Interval Events:  - NPH increased to 55, tube feeds continued glucerna 1.5 @ 40cc/hr  - ventilator requirements  AC 28/8/30/400 decreased RR to 20  - repeat blood gas at 1130 if blood gas is good decrease PEEP to 5  -Hypertensive overnight to 180s, hydralazine 10 x 2, 160s  - ETT advanced overnight by respiratory for decreased TV, good response  - wean propofol as tolerated      VITAL SIGNS, INS/OUTS (last 24 hours):  --------------------------------------------------------------------------------------  T(C): 35.9 (21 @ 23:00), Max: 38.2 (21 @ 12:00)  HR: 70 (21 @ 03:40) (50 - 80)  BP: 156/72 (21 @ 16:00) (156/72 - 156/72)  BP(mean): 98 (21 @ 16:00) (98 - 98)  ABP: 183/73 (21 @ 22:00) (127/56 - 183/73)  ABP(mean): 112 (21 @ 22:00) (75 - 112)  RR: 27 (21 @ 22:00) (27 - 28)  SpO2: 97% (21 @ 03:40) (92% - 99%)  CAPILLARY BLOOD GLUCOSE      POCT Blood Glucose.: 233 mg/dL (2021 05:35)  POCT Blood Glucose.: 287 mg/dL (2021 23:13)  POCT Blood Glucose.: 274 mg/dL (2021 18:20)  POCT Blood Glucose.: 261 mg/dL (2021 10:48)   N/A       @ 07:01  -   @ 07:00  --------------------------------------------------------  IN:    Enteral Tube Flush: 100 mL    FentaNYL: 26.7 mL    Glucerna: 750 mL    IV PiggyBack: 300 mL    Midazolam: 16.6 mL    Norepinephrine: 6.7 mL    Propofol: 344.7 mL  Total IN: 1544.7 mL    OUT:    Indwelling Catheter - Urethral (mL): 2020 mL    Rectal Tube (mL): 200 mL  Total OUT: 2220 mL    Total NET: -675.3 mL  --------------------------------------------------------------------------------------    EXAM  NEUROLOGY  Exam: sedated on propofol    RESPIRATORY  Exam: Lungs clear to auscultation, Normal expansion/effort.    [] Tracheostomy   [x] Intubated  Mechanical Ventilation: Mode: AC/ CMV (Assist Control/ Continuous Mandatory Ventilation), RR (machine): 28, TV (machine): 420, FiO2: 30, PEEP: 8, MAP: 17, PIP: 32    CARDIOVASCULAR  Exam: S1, S2.  Regular rate and rhythm     GI/NUTRITION  Exam: Abdomen soft, Non-tender, Non-distended.  NGT in place  Current Diet: Glucerna tube fees @ 40cc/hr    METABOLIC/FLUIDS/ELECTROLYTES      HEMATOLOGIC  [x] DVT Prophylaxis: aspirin  chewable 81 milliGRAM(s) Oral daily  clopidogrel Tablet 75 milliGRAM(s) Oral daily  enoxaparin Injectable 40 milliGRAM(s) SubCutaneous daily    Transfusions:	[] PRBC	[] Platelets		[] FFP	[] Cryoprecipitate    INFECTIOUS DISEASE  Antimicrobials/Immunologic Medications:  ampicillin/sulbactam  IVPB 3 Gram(s) IV Intermittent every 6 hours      VASCULAR  Exam: Extremities warm, pink, well-perfused.      MUSCULOSKELETAL  Exam: All extremities moving spontaneously without limitations.     SKIN  Exam: Good skin turgor, no skin breakdown.      LABS  --------------------------------------------------------------------------------------  CBC ( @ 01:52)                              11.3<L>                         9.57    )----------------(  421<H>     70.3  % Neutrophils, 11.7<L>% Lymphocytes, ANC: 6.73                                36.2<L>  CBC ( @ 03:44)                              10.9<L>                         14.27<H>  )----------------(  452<H>     71.2  % Neutrophils, 10.1<L>% Lymphocytes, ANC: 10.17<H>                              35.3<L>    BMP ( @ 01:54)             142     |  107     |  59<H> 		Ca++ --      Ca 8.7                ---------------------------------( 296<H>		Mg 2.9<H>             4.1     |  22      |  1.11  			Ph 4.0     BMP ( @ 03:44)             141     |  105     |  61<H> 		Ca++ --      Ca 8.6                ---------------------------------( 345<H>		Mg 3.0<H>             4.8     |  23      |  1.37<H>			Ph 4.0       LFTs ( @ 03:44)      TPro 6.3 / Alb 2.6<L> / TBili 0.3 / DBili -- / AST 8 / ALT 9 / AlkPhos 58        ABG ( @ 01:52)     7.48<H> / 30<L> / 91 / 24 / -0.8 / 97.7%     Lactate:     ABG ( @ 11:29)     7.46<H> / 34<L> / 92 / 25 / 0.4 / 97.3%     Lactate:         Urinalysis ( @ 05:16):     Color: Light Yellow / Appearance: Clear / S.024 / pH: 6.0 / Gluc: >= 1000 mg/dL<!> / Ketones: Negative / Bili: Negative / Urobili: <2 mg/dL / Protein :Negative / Nitrites: Negative / Leuk.Est: Negative / RBC: 5<H> / WBC: 5 / Sq Epi:  / Non Sq Epi:  / Bacteria Occasional<!>       -> .Blood Blood Culture ( @ 08:26)     NG    NG    No growth to date.    -> .Sputum Sputum trap Culture ( @ 07:42)       No polymorphonuclear leukocytes per low power field  No Squamous epithelial cells per low power field  Moderate Gram Positive Cocci in Clusters per oil power field    Staphylococcus aureus    Numerous Staphylococcus aureus  Normal Respiratory Susan absent    -> .Blood Blood-Peripheral Culture ( @ 21:17)     NG    NG    No Growth Final    -> .Sputum Sputum Culture ( @ 15:00)       Few polymorphonuclear leukocytes per low power field  Moderate Gram Positive Cocci per oil power field    Staphylococcus aureus    Moderate Staphylococcus aureus  Moderate Haemophilus influenzae "Susceptibilities not performed"  Normal Respiratory Susan present  --------------------------------------------------------------------------------------    IMAGING STUDIES

## 2021-01-30 NOTE — PROGRESS NOTE ADULT - ASSESSMENT
70M w/ HTN, HLD, CAD a/w hypoxic respiratory failure 2/2 COVID w/ superimposed bacterial PNA.     Neuro:  -sedated with propofol, midazolam, fentanyl, wean as tolerated   -off paralysis    Cardio:  -weaning off levo   -CAD s/p stents in 2015?; c/w aspirin and plavix    Respiratory:  -Acute hypoxic respiratory failure 2/2 COVID, progressed to severe ARDs; atelectasis noted at bilateral lung bases on pocus; will consider proning if P/F ratio worsens, currently stable/improving, will hold off proning,  -Vent settings: 28/420/10/50  -Trend ABG     ID:  -COVID-19 pneumonia; c/w dexamethasone for ten-day course (1/22-), remdesivir stopped  -sputum 1/23 w/ staph aureus and h.influenzae. c/w Unasyn 3G q6h     GI:  -OG tube in place, c/w tube feeds  -Patient w/o BMs, c/w bowel regimen      Renal:  -Oswald catheter placed; monitor Is&Os, monitor daily BMPs.  -OLGA improving    Endo:  -DMII A1c 9.6%, reportedly well controlled at home on 68U basaglar, Novolog 15tid.   -On admission, noted to be in DKA given glucose >200, bicarbonate <18, and pH <7.25; now resolved s/p insulin gtt. Patient transitioned to NPH 40 units q6h today  -daily bmp, q1h fingerstick glucose   -Endo cs    Hematologic:  -DVT prophylaxis with lovenox 40 subcutaneous daily; no anemia, leukocytosis, leukopenia, or thrombocytopenia     Skin:  -No known decubiti at the time of admission   -Right radial arterial line and LIJ venous catheter in place     Ethics:  Full code  Contact wife for updates    70M w/ HTN, HLD, CAD a/w hypoxic respiratory failure 2/2 COVID w/ superimposed bacterial PNA.     Neuro:  -sedated with propofol  -off paralysis    Cardio:  - no pressor requirements  -CAD s/p stents in 2015?; c/w aspirin and plavix, statin    Respiratory:  -Acute hypoxic respiratory failure 2/2 COVID, progressed to severe ARDs;   - proning,  -Vent settings: 20/420/8/30  -Trend ABG     ID:  -COVID-19 pneumonia; c/w dexamethasone for ten-day course (1/22-), remdesivir stopped  -sputum 1/23 w/ staph aureus and h.influenzae. c/w Unasyn 3G q6h     GI:  -OG tube in place, c/w tube feeds  -Patient w/o BMs, c/w bowel regimen      Renal:  -Oswald catheter placed; monitor Is&Os, monitor daily BMPs.  -OLGA improving    Endo:  -DMII A1c 9.6%, reportedly well controlled at home on 68U basaglar, Novolog 15tid.   -On admission, noted to be in DKA given glucose >200, bicarbonate <18, and pH <7.25; now resolved s/p insulin gtt  - transitioned to NPH 55 units q6h today  -daily bmp, q1h fingerstick glucose   -Endo cs    Hematologic:  -DVT prophylaxis with lovenox 40 subcutaneous daily; no anemia, leukocytosis, leukopenia, or thrombocytopenia     Skin:  -No known decubiti at the time of admission   -Right radial arterial line and LIJ venous catheter in place     Ethics:  Full code  Contact wife for updates    70M w/ HTN, HLD, CAD a/w hypoxic respiratory failure 2/2 COVID w/ superimposed bacterial PNA.     Neuro:  -sedated with propofol  -off paralysis    Cardio:  - no pressor requirements  -CAD s/p stents in 2015?; c/w aspirin and plavix, statin    Respiratory:  -Acute hypoxic respiratory failure 2/2 COVID, progressed to severe ARDs;   - s/p proning,  -Vent settings: 20/420/8/30  -Trend ABG     ID:  -COVID-19 pneumonia; c/w dexamethasone for ten-day course (1/22-), remdesivir stopped  -sputum 1/23 w/ staph aureus and h.influenzae. c/w Unasyn 3G q6h     GI:  -OG tube in place, c/w tube feeds  -Patient w/o BMs, c/w bowel regimen      Renal:  -Oswald catheter placed; monitor Is&Os, monitor daily BMPs.  -OLGA improving    Endo:  -DMII A1c 9.6%, reportedly well controlled at home on 68U basaglar, Novolog 15tid.   -On admission, noted to be in DKA given glucose >200, bicarbonate <18, and pH <7.25; now resolved s/p insulin gtt  - transitioned to NPH 55 units q6h today  -daily bmp, q1h fingerstick glucose   -Endo cs    Hematologic:  -DVT prophylaxis with lovenox 40 subcutaneous daily; no anemia, leukocytosis, leukopenia, or thrombocytopenia     Skin:  -No known decubiti at the time of admission   -Right radial arterial line and LIJ venous catheter in place     Ethics:  Full code  Contact wife for updates

## 2021-01-31 LAB
ANION GAP SERPL CALC-SCNC: 10 MMOL/L — SIGNIFICANT CHANGE UP (ref 7–14)
BASE EXCESS BLDA CALC-SCNC: 1.3 MMOL/L — SIGNIFICANT CHANGE UP (ref -2–2)
BASOPHILS # BLD AUTO: 0 K/UL — SIGNIFICANT CHANGE UP (ref 0–0.2)
BASOPHILS NFR BLD AUTO: 0 % — SIGNIFICANT CHANGE UP (ref 0–2)
BUN SERPL-MCNC: 44 MG/DL — HIGH (ref 7–23)
CALCIUM SERPL-MCNC: 8.7 MG/DL — SIGNIFICANT CHANGE UP (ref 8.4–10.5)
CHLORIDE SERPL-SCNC: 111 MMOL/L — HIGH (ref 98–107)
CO2 SERPL-SCNC: 24 MMOL/L — SIGNIFICANT CHANGE UP (ref 22–31)
CREAT SERPL-MCNC: 1.19 MG/DL — SIGNIFICANT CHANGE UP (ref 0.5–1.3)
EOSINOPHIL # BLD AUTO: 0 K/UL — SIGNIFICANT CHANGE UP (ref 0–0.5)
EOSINOPHIL NFR BLD AUTO: 0 % — SIGNIFICANT CHANGE UP (ref 0–6)
GLUCOSE BLDC GLUCOMTR-MCNC: 108 MG/DL — HIGH (ref 70–99)
GLUCOSE BLDC GLUCOMTR-MCNC: 127 MG/DL — HIGH (ref 70–99)
GLUCOSE BLDC GLUCOMTR-MCNC: 99 MG/DL — SIGNIFICANT CHANGE UP (ref 70–99)
GLUCOSE SERPL-MCNC: 93 MG/DL — SIGNIFICANT CHANGE UP (ref 70–99)
HCO3 BLDA-SCNC: 26 MMOL/L — SIGNIFICANT CHANGE UP (ref 22–26)
HCT VFR BLD CALC: 37.1 % — LOW (ref 39–50)
HGB BLD-MCNC: 11.2 G/DL — LOW (ref 13–17)
IANC: 7.77 K/UL — SIGNIFICANT CHANGE UP (ref 1.5–8.5)
LYMPHOCYTES # BLD AUTO: 1.17 K/UL — SIGNIFICANT CHANGE UP (ref 1–3.3)
LYMPHOCYTES # BLD AUTO: 9.6 % — LOW (ref 13–44)
MAGNESIUM SERPL-MCNC: 2.3 MG/DL — SIGNIFICANT CHANGE UP (ref 1.6–2.6)
MCHC RBC-ENTMCNC: 25.7 PG — LOW (ref 27–34)
MCHC RBC-ENTMCNC: 30.2 GM/DL — LOW (ref 32–36)
MCV RBC AUTO: 85.3 FL — SIGNIFICANT CHANGE UP (ref 80–100)
MONOCYTES # BLD AUTO: 1.49 K/UL — HIGH (ref 0–0.9)
MONOCYTES NFR BLD AUTO: 12.3 % — SIGNIFICANT CHANGE UP (ref 2–14)
NEUTROPHILS # BLD AUTO: 9.38 K/UL — HIGH (ref 1.8–7.4)
NEUTROPHILS NFR BLD AUTO: 77.2 % — HIGH (ref 43–77)
PCO2 BLDA: 39 MMHG — SIGNIFICANT CHANGE UP (ref 35–48)
PH BLDA: 7.43 — SIGNIFICANT CHANGE UP (ref 7.35–7.45)
PHOSPHATE SERPL-MCNC: 3.7 MG/DL — SIGNIFICANT CHANGE UP (ref 2.5–4.5)
PLATELET # BLD AUTO: 413 K/UL — HIGH (ref 150–400)
PO2 BLDA: 83 MMHG — SIGNIFICANT CHANGE UP (ref 83–108)
POTASSIUM SERPL-MCNC: 4.3 MMOL/L — SIGNIFICANT CHANGE UP (ref 3.5–5.3)
POTASSIUM SERPL-SCNC: 4.3 MMOL/L — SIGNIFICANT CHANGE UP (ref 3.5–5.3)
PROCALCITONIN SERPL-MCNC: 0.27 NG/ML — HIGH (ref 0.02–0.1)
RBC # BLD: 4.35 M/UL — SIGNIFICANT CHANGE UP (ref 4.2–5.8)
RBC # FLD: 13.9 % — SIGNIFICANT CHANGE UP (ref 10.3–14.5)
SAO2 % BLDA: 96.5 % — SIGNIFICANT CHANGE UP (ref 95–99)
SODIUM SERPL-SCNC: 145 MMOL/L — SIGNIFICANT CHANGE UP (ref 135–145)
TRIGL SERPL-MCNC: 335 MG/DL — HIGH
WBC # BLD: 12.15 K/UL — HIGH (ref 3.8–10.5)
WBC # FLD AUTO: 12.15 K/UL — HIGH (ref 3.8–10.5)

## 2021-01-31 PROCEDURE — 99291 CRITICAL CARE FIRST HOUR: CPT

## 2021-01-31 RX ORDER — CHLORHEXIDINE GLUCONATE 213 G/1000ML
15 SOLUTION TOPICAL
Refills: 0 | Status: DISCONTINUED | OUTPATIENT
Start: 2021-01-31 | End: 2021-01-31

## 2021-01-31 RX ADMIN — ENOXAPARIN SODIUM 40 MILLIGRAM(S): 100 INJECTION SUBCUTANEOUS at 14:51

## 2021-01-31 RX ADMIN — Medication 1 APPLICATION(S): at 14:53

## 2021-01-31 RX ADMIN — CHLORHEXIDINE GLUCONATE 1 APPLICATION(S): 213 SOLUTION TOPICAL at 06:09

## 2021-01-31 RX ADMIN — SIMVASTATIN 20 MILLIGRAM(S): 20 TABLET, FILM COATED ORAL at 22:42

## 2021-01-31 RX ADMIN — AMPICILLIN SODIUM AND SULBACTAM SODIUM 200 GRAM(S): 250; 125 INJECTION, POWDER, FOR SUSPENSION INTRAMUSCULAR; INTRAVENOUS at 12:00

## 2021-01-31 RX ADMIN — CHLORHEXIDINE GLUCONATE 15 MILLILITER(S): 213 SOLUTION TOPICAL at 04:43

## 2021-01-31 RX ADMIN — AMPICILLIN SODIUM AND SULBACTAM SODIUM 200 GRAM(S): 250; 125 INJECTION, POWDER, FOR SUSPENSION INTRAMUSCULAR; INTRAVENOUS at 06:08

## 2021-01-31 RX ADMIN — TAMSULOSIN HYDROCHLORIDE 0.4 MILLIGRAM(S): 0.4 CAPSULE ORAL at 22:42

## 2021-01-31 RX ADMIN — CLOPIDOGREL BISULFATE 75 MILLIGRAM(S): 75 TABLET, FILM COATED ORAL at 14:52

## 2021-01-31 NOTE — CHART NOTE - NSCHARTNOTEFT_GEN_A_CORE
Diabetes follow up   Chart reviewed   Glucose below target of 140-180 mg/dL - however, TF was off due to extubation   Patient now extubated and being monitoring   Diet not yet to resume   Will discontinue NPH  C/W Admelog MODERATE correctional scale every 6 hours   Will re-access insulin need once diet is advanced   Consider carbohydrate consistent diet when advanced   D/W Dr Morrell  Endocrine following      MEDICATIONS  (STANDING):  ampicillin/sulbactam  IVPB 3 Gram(s) IV Intermittent every 6 hours  aspirin  chewable 81 milliGRAM(s) Oral daily  chlorhexidine 0.12% Liquid 15 milliLiter(s) Swish and Spit two times a day  chlorhexidine 0.12% Liquid 15 milliLiter(s) Oral Mucosa every 12 hours  chlorhexidine 4% Liquid 1 Application(s) Topical <User Schedule>  clopidogrel Tablet 75 milliGRAM(s) Oral daily  dexAMETHasone  Injectable 6 milliGRAM(s) IV Push once  dexMEDEtomidine Infusion 0.2 MICROgram(s)/kG/Hr (4.13 mL/Hr) IV Continuous <Continuous>  dextrose 50% Injectable 25 Gram(s) IV Push once  dextrose 50% Injectable 12.5 Gram(s) IV Push once  enoxaparin Injectable 40 milliGRAM(s) SubCutaneous daily  fenofibrate Tablet 145 milliGRAM(s) Oral daily  glucagon  Injectable 1 milliGRAM(s) IntraMuscular once  insulin lispro (ADMELOG) corrective regimen sliding scale   SubCutaneous every 6 hours  insulin NPH human recombinant 55 Unit(s) SubCutaneous every 6 hours  petrolatum Ophthalmic Ointment 1 Application(s) Both EYES daily  simvastatin 20 milliGRAM(s) Oral at bedtime  tamsulosin 0.4 milliGRAM(s) Oral at bedtime    CAPILLARY BLOOD GLUCOSE      POCT Blood Glucose.: 108 mg/dL (31 Jan 2021 11:29)  POCT Blood Glucose.: 127 mg/dL (31 Jan 2021 04:31)  POCT Blood Glucose.: 83 mg/dL (30 Jan 2021 23:08)  POCT Blood Glucose.: 77 mg/dL (30 Jan 2021 19:44)  POCT Blood Glucose.: 62 mg/dL (30 Jan 2021 19:42)  POCT Blood Glucose.: 76 mg/dL (30 Jan 2021 17:46)

## 2021-01-31 NOTE — CHART NOTE - NSCHARTNOTEFT_GEN_A_CORE
MICU Transfer Note    Transfer from: MICU    Transfer to: ( X) Medicine    (  ) Telemetry     (   ) RCU        (    ) Palliative         (   ) Stroke Unit          (   ) __________________    Accepting Physician: Beata Keys given to:     MICU COURSE:    Patient presented on 1/22 with fever/cough/shortness of breath and GI symptoms of pain and diarrhea.   He was hypoxemic in the emergency room and was intubated at that time and transferred to MICU.   While in MICU he was also diagnosed with superimposed bacterial pneumonia (MSSA and HFlu).   He was treated with antibiotics and had improving oxygenation.     He was extubated on 1/31 to nasal cannula and is awake and alert without respiratory distress.     Plan to transfer to medical floor.     ASSESSMENT & PLAN:     71y/o M with PMHx HTN, HLD, poorly controlled T2DM, CAD (+stents), with COVID ARDS c/b superimposed bacterial PNA, extubated this morning 1/31.   Now on nasal cannula with improving alertness, moving all extremities, good cough. Follows commands.     Resp:  -extubated this morning to NC, will continue to monitor    CV: CAD s/p stents in ?2015  -continue ASA, plavix, statin    FEN/GI/Renal: OLGA improving   - Bedside swallow evaluation and then regular diet as tolerated.  -bowel regimen    ID:  -Unasyn for PNA (sputum 1/23 + staph aureus and h. flu) Abx course began 1/25, to continue through 2/1 for 7 day course    Heme:  -lovenox prophylaxis    Endo:  -endo following, appreciate recs. Will need to reassess insulin regimen now that he is extubated  -NPH on hold while NPO  -monitor FS when he is able to eat again.     Neuro:  - Awake, alert, oriented, following commands.           FOR FOLLOW UP:  Plan to continue antibiotics for MSSA/H. Flu pneumonia through 2/1  Titrate oxygen as needed.

## 2021-01-31 NOTE — CHART NOTE - NSCHARTNOTEFT_GEN_A_CORE
MAR Accept Note  Transfer to:  medicine  Accepting Attending Physician: jesus/Dr. Cota    Assigned Room:  6T    Patient seen and examined.   Labs and data reviewed.   No findings precluding transfer of service.       HPI/MICU COURSE:   Please refer to MICU transfer note for full details. Briefly, this is a 70M with PMH of HTN, HLD, T2DM, CAD s/p stents, initially p/w fevers, coug, SOB, and GI sx,  admitted with COVID ARDS, course c/b bacterial PNA. Patient was intubated on 1/22. Found to have MSSA and H. flu in sputum cx, started on unasyn (1/25) for 7 day course.    Patient presented on 1/22 with fever/cough/shortness of breath and GI symptoms of pain and diarrhea.   He was hypoxemic in the emergency room and was intubated at that time and transferred to MICU.   While in MICU he was also diagnosed with superimposed bacterial pneumonia (MSSA and HFlu).   He was treated with antibiotics and had improving oxygenation.     He was extubated on 1/31 to nasal cannula and is awake and alert without respiratory distress.     Plan to transfer to medical floor.     ASSESSMENT & PLAN:     71y/o M with PMHx HTN, HLD, poorly controlled T2DM, CAD (+stents), with COVID ARDS c/b superimposed bacterial PNA, extubated this morning 1/31.   Now on nasal cannula with improving alertness, moving all extremities, good cough. Follows commands.     Resp:  -extubated this morning to NC, will continue to monitor    CV: CAD s/p stents in ?2015  -continue ASA, plavix, statin    FEN/GI/Renal: OLGA improving   - Bedside swallow evaluation and then regular diet as tolerated.  -bowel regimen    ID:  -Unasyn for PNA (sputum 1/23 + staph aureus and h. flu) Abx course began 1/25, to continue through 2/1 for 7 day course    Heme:  -lovenox prophylaxis    Endo:  -endo following, appreciate recs. Will need to reassess insulin regimen now that he is extubated  -NPH on hold while NPO  -monitor FS when he is able to eat again.     Neuro:  - Awake, alert, oriented, following commands.           FOR FOLLOW UP:  Plan to continue antibiotics for MSSA/H. Flu pneumonia through 2/1  Titrate oxygen as needed.    FOR FOLLOW-UP:    Bhavani Egan PGY3  [phone] MAR Accept Note  Transfer to:  medicine  Accepting Attending Physician: jesus/Dr. Cota    Assigned Room:  6T    Patient seen and examined.   Labs and data reviewed.   No findings precluding transfer of service.       HPI/MICU COURSE:   Please refer to MICU transfer note for full details. Briefly, this is a 70M with PMH of HTN, HLD, T2DM, CAD s/p stents, initially p/w fevers, cough, SOB, and GI sx,  admitted with COVID ARDS, course c/b bacterial PNA. Patient was intubated on 1/22 for hypoxemia, transferred to MICU. Found to have MSSA and H. flu in sputum cx, started on unasyn (1/25) for 7 day course. Extubated on 1/31 to nasal cannula (4L). Patient is AAOx2 (to person and place) on exam, has productive cough but otherwise appears comfortable with sats in the low 90s. Pt stable for transfer to floors.     For follow up:  [] c/w unasyn for MSSA PNA until 2/1  [] TOV at midnight   [] ensure pt has adequate suction and chest PT   [] bedside swallow or official S/S prior to starting diet   [] adjust insulin regimen if starting diet     Bhavani Egan PGY3  [phone]

## 2021-01-31 NOTE — PROGRESS NOTE ADULT - SUBJECTIVE AND OBJECTIVE BOX
INTERVAL HPI/OVERNIGHT EVENTS: Hypoglycemic O/N to 63, NPH held. NPO at 3am for extubation    SUBJECTIVE: Patient seen and examined at bedside.     ROS: unable to obtain 2/2 clinical status    OBJECTIVE:    VITAL SIGNS:  ICU Vital Signs Last 24 Hrs  T(C): 37.7 (31 Jan 2021 04:00), Max: 38.1 (30 Jan 2021 16:52)  T(F): 99.9 (31 Jan 2021 04:00), Max: 100.5 (30 Jan 2021 16:52)  HR: 70 (31 Jan 2021 10:00) (63 - 90)  BP: 121/67 (31 Jan 2021 04:00) (117/73 - 133/60)  BP(mean): 73 (31 Jan 2021 00:00) (73 - 79)  ABP: 124/57 (31 Jan 2021 10:00) (122/55 - 162/69)  ABP(mean): 80 (31 Jan 2021 09:00) (74 - 100)  RR: 20 (31 Jan 2021 10:00) (18 - 38)  SpO2: 100% (31 Jan 2021 10:00) (73% - 100%)    Mode: standby    01-30 @ 07:01 - 01-31 @ 07:00  --------------------------------------------------------  IN: 1057.9 mL / OUT: 3095 mL / NET: -2037.1 mL    01-31 @ 07:01 - 01-31 @ 11:13  --------------------------------------------------------  IN: 0 mL / OUT: 500 mL / NET: -500 mL      CAPILLARY BLOOD GLUCOSE      POCT Blood Glucose.: 127 mg/dL (31 Jan 2021 04:31)      PHYSICAL EXAM:    General: NAD  HEENT: NC/AT; PERRL, clear conjunctiva  Neck: supple  Respiratory: CTA b/l  Cardiovascular: +S1/S2; RRR  Abdomen: soft, NT/ND; +BS x4  Extremities: WWP, 2+ peripheral pulses b/l; no LE edema  Skin: normal color and turgor; no rash  Neurological: opens eyes, follows basic commands    MEDICATIONS:  MEDICATIONS  (STANDING):  ampicillin/sulbactam  IVPB 3 Gram(s) IV Intermittent every 6 hours  aspirin  chewable 81 milliGRAM(s) Oral daily  chlorhexidine 0.12% Liquid 15 milliLiter(s) Swish and Spit two times a day  chlorhexidine 0.12% Liquid 15 milliLiter(s) Oral Mucosa every 12 hours  chlorhexidine 4% Liquid 1 Application(s) Topical <User Schedule>  clopidogrel Tablet 75 milliGRAM(s) Oral daily  dexAMETHasone  Injectable 6 milliGRAM(s) IV Push once  dexMEDEtomidine Infusion 0.2 MICROgram(s)/kG/Hr (4.13 mL/Hr) IV Continuous <Continuous>  dextrose 50% Injectable 25 Gram(s) IV Push once  dextrose 50% Injectable 12.5 Gram(s) IV Push once  enoxaparin Injectable 40 milliGRAM(s) SubCutaneous daily  fenofibrate Tablet 145 milliGRAM(s) Oral daily  glucagon  Injectable 1 milliGRAM(s) IntraMuscular once  insulin lispro (ADMELOG) corrective regimen sliding scale   SubCutaneous every 6 hours  insulin NPH human recombinant 55 Unit(s) SubCutaneous every 6 hours  petrolatum Ophthalmic Ointment 1 Application(s) Both EYES daily  simvastatin 20 milliGRAM(s) Oral at bedtime  tamsulosin 0.4 milliGRAM(s) Oral at bedtime    MEDICATIONS  (PRN):  haloperidol    Injectable 5 milliGRAM(s) IV Push every 6 hours PRN Agitation  hydrALAZINE Injectable 10 milliGRAM(s) IV Push every 6 hours PRN SBP >180      ALLERGIES:  Allergies    No Known Allergies    Intolerances        LABS:                        11.2   12.15 )-----------( 413      ( 31 Jan 2021 04:17 )             37.1     01-31    145  |  111<H>  |  44<H>  ----------------------------<  93  4.3   |  24  |  1.19    Ca    8.7      31 Jan 2021 04:17  Phos  3.7     01-31  Mg     2.3     01-31            RADIOLOGY & ADDITIONAL TESTS: Reviewed.

## 2021-01-31 NOTE — PROGRESS NOTE ADULT - ATTENDING COMMENTS
N - Precedex, wean as toelrated  P - EXtubated to NC this am  C - CAD s/p stents on aspirin and plavix. Hydralazine prn for hypertension.  GI - TFS at goal, on ppi, having BMs   - ckd stage 3, monitor  H - lovenox for ppx, aspirin/plavix for stents  ID - unasyn for 7 day course ends tomorrow,  E - NPO, insulin held. Monitor closely, resume feeds as clniical status allows today

## 2021-01-31 NOTE — PROGRESS NOTE ADULT - ASSESSMENT
71y/o M with PMHx HTN, HLD, poorly controlled T2DM, CAD (+stents), with COVID ARDS c/b superimposed bacterial PNA, extubated this morning 1/31.     Resp:  -extubated this morning to NC, will continue to monitor closely    CV: CAD s/p stents in ?2015  -continue ASA, plavix, statin    FEN/GI/Renal: OLGA improving   -currently NPO, will re-evaluate ability to trial PO in the afternoon  -bowel regimen    ID:  -Unasyn for PNA (sputum 1/23 + staph aureus and h. flu) Abx course began 1/25, to continue through tomorrow for 7 day course    Heme:  -lovenox ppx    Endo:  -endo following, appreciate recs. Will need to reassess insulin regimen now that he is extubated  -NPH on hold while NPO  -monitor FS    Neuro:  -precedex 0.5, off other sedatives    Family updated this morning

## 2021-02-01 DIAGNOSIS — J96.01 ACUTE RESPIRATORY FAILURE WITH HYPOXIA: ICD-10-CM

## 2021-02-01 LAB
ALBUMIN SERPL ELPH-MCNC: 2.9 G/DL — LOW (ref 3.3–5)
ALP SERPL-CCNC: 55 U/L — SIGNIFICANT CHANGE UP (ref 40–120)
ALT FLD-CCNC: 19 U/L — SIGNIFICANT CHANGE UP (ref 4–41)
ANION GAP SERPL CALC-SCNC: 14 MMOL/L — SIGNIFICANT CHANGE UP (ref 7–14)
AST SERPL-CCNC: 21 U/L — SIGNIFICANT CHANGE UP (ref 4–40)
BASOPHILS # BLD AUTO: 0.04 K/UL — SIGNIFICANT CHANGE UP (ref 0–0.2)
BASOPHILS NFR BLD AUTO: 0.3 % — SIGNIFICANT CHANGE UP (ref 0–2)
BILIRUB SERPL-MCNC: 0.7 MG/DL — SIGNIFICANT CHANGE UP (ref 0.2–1.2)
BUN SERPL-MCNC: 33 MG/DL — HIGH (ref 7–23)
CALCIUM SERPL-MCNC: 9.1 MG/DL — SIGNIFICANT CHANGE UP (ref 8.4–10.5)
CHLORIDE SERPL-SCNC: 108 MMOL/L — HIGH (ref 98–107)
CO2 SERPL-SCNC: 21 MMOL/L — LOW (ref 22–31)
CREAT SERPL-MCNC: 1.08 MG/DL — SIGNIFICANT CHANGE UP (ref 0.5–1.3)
CULTURE RESULTS: SIGNIFICANT CHANGE UP
D DIMER BLD IA.RAPID-MCNC: 729 NG/ML DDU — HIGH
EOSINOPHIL # BLD AUTO: 0.14 K/UL — SIGNIFICANT CHANGE UP (ref 0–0.5)
EOSINOPHIL NFR BLD AUTO: 0.9 % — SIGNIFICANT CHANGE UP (ref 0–6)
GLUCOSE BLDC GLUCOMTR-MCNC: 101 MG/DL — HIGH (ref 70–99)
GLUCOSE BLDC GLUCOMTR-MCNC: 120 MG/DL — HIGH (ref 70–99)
GLUCOSE BLDC GLUCOMTR-MCNC: 139 MG/DL — HIGH (ref 70–99)
GLUCOSE BLDC GLUCOMTR-MCNC: 198 MG/DL — HIGH (ref 70–99)
GLUCOSE BLDC GLUCOMTR-MCNC: 209 MG/DL — HIGH (ref 70–99)
GLUCOSE BLDC GLUCOMTR-MCNC: 216 MG/DL — HIGH (ref 70–99)
GLUCOSE BLDC GLUCOMTR-MCNC: 224 MG/DL — HIGH (ref 70–99)
GLUCOSE SERPL-MCNC: 151 MG/DL — HIGH (ref 70–99)
HCT VFR BLD CALC: 39.4 % — SIGNIFICANT CHANGE UP (ref 39–50)
HGB BLD-MCNC: 11.9 G/DL — LOW (ref 13–17)
IANC: 11.25 K/UL — HIGH (ref 1.5–8.5)
IMM GRANULOCYTES NFR BLD AUTO: 1.7 % — HIGH (ref 0–1.5)
LYMPHOCYTES # BLD AUTO: 1.65 K/UL — SIGNIFICANT CHANGE UP (ref 1–3.3)
LYMPHOCYTES # BLD AUTO: 11 % — LOW (ref 13–44)
MAGNESIUM SERPL-MCNC: 2.3 MG/DL — SIGNIFICANT CHANGE UP (ref 1.6–2.6)
MCHC RBC-ENTMCNC: 26 PG — LOW (ref 27–34)
MCHC RBC-ENTMCNC: 30.2 GM/DL — LOW (ref 32–36)
MCV RBC AUTO: 86.2 FL — SIGNIFICANT CHANGE UP (ref 80–100)
MONOCYTES # BLD AUTO: 1.69 K/UL — HIGH (ref 0–0.9)
MONOCYTES NFR BLD AUTO: 11.2 % — SIGNIFICANT CHANGE UP (ref 2–14)
NEUTROPHILS # BLD AUTO: 11.25 K/UL — HIGH (ref 1.8–7.4)
NEUTROPHILS NFR BLD AUTO: 74.9 % — SIGNIFICANT CHANGE UP (ref 43–77)
NRBC # BLD: 0 /100 WBCS — SIGNIFICANT CHANGE UP
NRBC # FLD: 0 K/UL — SIGNIFICANT CHANGE UP
PHOSPHATE SERPL-MCNC: 3.7 MG/DL — SIGNIFICANT CHANGE UP (ref 2.5–4.5)
PLATELET # BLD AUTO: 375 K/UL — SIGNIFICANT CHANGE UP (ref 150–400)
POTASSIUM SERPL-MCNC: 4.5 MMOL/L — SIGNIFICANT CHANGE UP (ref 3.5–5.3)
POTASSIUM SERPL-SCNC: 4.5 MMOL/L — SIGNIFICANT CHANGE UP (ref 3.5–5.3)
PROT SERPL-MCNC: 7 G/DL — SIGNIFICANT CHANGE UP (ref 6–8.3)
RBC # BLD: 4.57 M/UL — SIGNIFICANT CHANGE UP (ref 4.2–5.8)
RBC # FLD: 14 % — SIGNIFICANT CHANGE UP (ref 10.3–14.5)
SODIUM SERPL-SCNC: 143 MMOL/L — SIGNIFICANT CHANGE UP (ref 135–145)
SPECIMEN SOURCE: SIGNIFICANT CHANGE UP
WBC # BLD: 15.03 K/UL — HIGH (ref 3.8–10.5)
WBC # FLD AUTO: 15.03 K/UL — HIGH (ref 3.8–10.5)

## 2021-02-01 PROCEDURE — 99232 SBSQ HOSP IP/OBS MODERATE 35: CPT

## 2021-02-01 RX ADMIN — Medication 4: at 18:26

## 2021-02-01 RX ADMIN — Medication 2: at 13:04

## 2021-02-01 RX ADMIN — Medication 4: at 23:21

## 2021-02-01 RX ADMIN — ENOXAPARIN SODIUM 40 MILLIGRAM(S): 100 INJECTION SUBCUTANEOUS at 13:06

## 2021-02-01 RX ADMIN — Medication 81 MILLIGRAM(S): at 13:06

## 2021-02-01 RX ADMIN — Medication 145 MILLIGRAM(S): at 13:07

## 2021-02-01 RX ADMIN — CLOPIDOGREL BISULFATE 75 MILLIGRAM(S): 75 TABLET, FILM COATED ORAL at 13:06

## 2021-02-01 NOTE — PROGRESS NOTE ADULT - SUBJECTIVE AND OBJECTIVE BOX
[facility] [place] [facility]   MD CHAPIN MCADAMS MD RUORU WONG, PA    TEL:  OFFICE: [phone]  DR ALCANTAR CELL: [phone]  YAMILET BOATENG CELL: [phone]  DR. ACOSTA CELL: [phone]    RENAL FOLLOW UP NOTE  --------------------------------------------------------------------------------  HPI:      Pt seen and examined at bedside.   Jonnathan GILMORE, chest pain     PAST HISTORY  --------------------------------------------------------------------------------  No significant changes to PMH, PSH, FHx, SHx, unless otherwise noted    ALLERGIES & MEDICATIONS  --------------------------------------------------------------------------------  Allergies    No Known Allergies    Intolerances      Standing Inpatient Medications  ALBUTerol/ipratropium for Nebulization 3 milliLiter(s) Nebulizer every 6 hours  amLODIPine   Tablet 10 milliGRAM(s) Oral at bedtime  AQUAPHOR (petrolatum Ointment) 1 Application(s) Topical two times a day  aspirin enteric coated 81 milliGRAM(s) Oral daily  atorvastatin 40 milliGRAM(s) Oral at bedtime  buDESOnide 160 MICROgram(s)/formoterol 4.5 MICROgram(s) Inhaler 2 Puff(s) Inhalation two times a day  calcium acetate 667 milliGRAM(s) Oral three times a day with meals  carvedilol 12.5 milliGRAM(s) Oral every 12 hours  clopidogrel Tablet 75 milliGRAM(s) Oral daily  dextrose 5%. 1000 milliLiter(s) IV Continuous <Continuous>  dextrose 50% Injectable 12.5 Gram(s) IV Push once  dextrose 50% Injectable 25 Gram(s) IV Push once  dextrose 50% Injectable 25 Gram(s) IV Push once  epoetin terence Injectable 54636 Unit(s) SubCutaneous <User Schedule>  ferrous    sulfate 325 milliGRAM(s) Oral daily  furosemide   Injectable 80 milliGRAM(s) IV Push two times a day  gabapentin 300 milliGRAM(s) Oral at bedtime  heparin  Injectable 5000 Unit(s) SubCutaneous every 12 hours  hydrALAZINE 100 milliGRAM(s) Oral three times a day  influenza   Vaccine 0.5 milliLiter(s) IntraMuscular once  insulin glargine Injectable (LANTUS) 10 Unit(s) SubCutaneous at bedtime  insulin lispro (HumaLOG) corrective regimen sliding scale   SubCutaneous three times a day before meals  insulin lispro (HumaLOG) corrective regimen sliding scale   SubCutaneous at bedtime  insulin lispro Injectable (HumaLOG) 3 Unit(s) SubCutaneous three times a day before meals  iron sucrose IVPB 100 milliGRAM(s) IV Intermittent every 24 hours  nicotine -  14 mG/24Hr(s) Patch 1 patch Transdermal daily  pantoprazole    Tablet 40 milliGRAM(s) Oral before breakfast  predniSONE   Tablet 40 milliGRAM(s) Oral daily    PRN Inpatient Medications  acetaminophen   Tablet .. 500 milliGRAM(s) Oral every 4 hours PRN  ALBUTerol/ipratropium for Nebulization 3 milliLiter(s) Nebulizer every 2 hours PRN  dextrose 40% Gel 15 Gram(s) Oral once PRN  glucagon  Injectable 1 milliGRAM(s) IntraMuscular once PRN  nicotine - Inhaler 1 Each Inhalation every 1 hour PRN  sodium chloride 0.65% Nasal 1 Spray(s) Both Nostrils two times a day PRN      REVIEW OF SYSTEMS  --------------------------------------------------------------------------------  General: no fever  CVS: no chest pain  RESP: no sob, no cough  ABD: no abdominal pain      VITALS/PHYSICAL EXAM  --------------------------------------------------------------------------------  T(C): 36.9 (11-03-18 @ 05:05), Max: 36.9 (11-03-18 @ 05:05)  HR: 76 (11-03-18 @ 11:50) (68 - 86)  BP: 153/73 (11-03-18 @ 11:50) (130/56 - 153/73)  RR: 20 (11-03-18 @ 05:05) (20 - 20)  SpO2: 92% (11-03-18 @ 05:05) (92% - 92%)  Wt(kg): --  Height (cm): 157.48 (11-01-18 @ 20:19)      11-02-18 @ 07:01  -  11-03-18 @ 07:00  --------------------------------------------------------  IN: 1060 mL / OUT: 750 mL / NET: 310 mL    11-03-18 @ 08:01  -  11-03-18 @ 13:07  --------------------------------------------------------  IN: 360 mL / OUT: 0 mL / NET: 360 mL      Physical Exam:  	Gen: NAD  	HEENT: MMJAMARCUS  	Pulm: CTA B/L  	CV: S1S2  	Abd: Soft, +BS  	Ext: + LE edema B/L                      Neuro: Awake   	Skin: Warm and Dry   	Vascular access:    LABS/STUDIES  --------------------------------------------------------------------------------              7.6    4.61  >-----------<  203      [11-03-18 @ 08:35]              25.1     133  |  93  |  88  ----------------------------<  111      [11-03-18 @ 06:20]  4.7   |  24  |  4.33        Ca     8.5     [11-03-18 @ 06:20]      Phos  5.4     [11-02-18 @ 05:42]            Creatinine Trend:  SCr 4.33 [11-03 @ 06:20]  SCr 4.35 [11-02 @ 05:42]  SCr 4.24 [11-01 @ 05:51]  SCr 3.88 [10-31 @ 06:00]  SCr 3.93 [10-30 @ 15:36]        Iron 32, TIBC 244, %sat 13      [10-31-18 @ 09:21]  Ferritin 142      [11-01-18 @ 13:52]  PTH -- (Ca 8.7)      [10-31-18 @ 09:24]   269  HbA1c 5.6      [10-31-18 @ 07:56]  TSH 0.58      [11-02-18 @ 08:02] PROGRESS NOTE:     Patient is a 70y old  Male who presents with a chief complaint of Acute hypoxic respiratory failure 2' COVID-19 PNA (31 Jan 2021 11:13)      SUBJECTIVE / OVERNIGHT EVENTS:    ADDITIONAL REVIEW OF SYSTEMS:    MEDICATIONS  (STANDING):  ampicillin/sulbactam  IVPB 3 Gram(s) IV Intermittent every 6 hours  aspirin  chewable 81 milliGRAM(s) Oral daily  clopidogrel Tablet 75 milliGRAM(s) Oral daily  dextrose 50% Injectable 25 Gram(s) IV Push once  dextrose 50% Injectable 12.5 Gram(s) IV Push once  enoxaparin Injectable 40 milliGRAM(s) SubCutaneous daily  fenofibrate Tablet 145 milliGRAM(s) Oral daily  glucagon  Injectable 1 milliGRAM(s) IntraMuscular once  insulin lispro (ADMELOG) corrective regimen sliding scale   SubCutaneous every 6 hours  simvastatin 20 milliGRAM(s) Oral at bedtime  tamsulosin 0.4 milliGRAM(s) Oral at bedtime    MEDICATIONS  (PRN):  haloperidol    Injectable 5 milliGRAM(s) IV Push every 6 hours PRN Agitation  hydrALAZINE Injectable 10 milliGRAM(s) IV Push every 6 hours PRN SBP >180      CAPILLARY BLOOD GLUCOSE      POCT Blood Glucose.: 224 mg/dL (01 Feb 2021 17:57)  POCT Blood Glucose.: 198 mg/dL (01 Feb 2021 12:26)  POCT Blood Glucose.: 139 mg/dL (01 Feb 2021 05:50)  POCT Blood Glucose.: 120 mg/dL (01 Feb 2021 03:11)  POCT Blood Glucose.: 101 mg/dL (01 Feb 2021 00:11)  POCT Blood Glucose.: 99 mg/dL (31 Jan 2021 21:19)    I&O's Summary    31 Jan 2021 07:01  -  01 Feb 2021 07:00  --------------------------------------------------------  IN: 0 mL / OUT: 1625 mL / NET: -1625 mL    01 Feb 2021 07:01  -  01 Feb 2021 19:29  --------------------------------------------------------  IN: 0 mL / OUT: 800 mL / NET: -800 mL        PHYSICAL EXAM:  Vital Signs Last 24 Hrs  T(C): 37.3 (01 Feb 2021 14:16), Max: 37.4 (01 Feb 2021 04:30)  T(F): 99.1 (01 Feb 2021 14:16), Max: 99.3 (01 Feb 2021 04:30)  HR: 95 (01 Feb 2021 14:16) (79 - 98)  BP: 163/81 (01 Feb 2021 14:16) (123/73 - 168/74)  BP(mean): --  RR: 16 (01 Feb 2021 14:16) (16 - 20)  SpO2: 94% (01 Feb 2021 14:16) (94% - 96%)    CONSTITUTIONAL: NAD, well-developed  diminish breath sounds bilateral   generalized weakness in upper and lower extremity       LABS:                        11.9   15.03 )-----------( 375      ( 01 Feb 2021 07:45 )             39.4     02-01    143  |  108<H>  |  33<H>  ----------------------------<  151<H>  4.5   |  21<L>  |  1.08    Ca    9.1      01 Feb 2021 07:45  Phos  3.7     02-01  Mg     2.3     02-01    TPro  7.0  /  Alb  2.9<L>  /  TBili  0.7  /  DBili  x   /  AST  21  /  ALT  19  /  AlkPhos  55  02-01      RADIOLOGY & ADDITIONAL TESTS:  Results Reviewed: y  Imaging Personally Reviewed:y  Electrocardiogram Personally Reviewed:n    COORDINATION OF CARE:  Care Discussed with Consultants/Other Providers [Y/N]:y  Prior or Outpatient Records Reviewed [Y/N]:y

## 2021-02-01 NOTE — SWALLOW BEDSIDE ASSESSMENT ADULT - COMMENTS
Progress note 1/31/2021: 71y/o M with PMHx HTN, HLD, poorly controlled T2DM, CAD (+stents), with COVID ARDS c/b superimposed bacterial PNA, extubated this morning 1/31.     CXR 1/24/2021: Persistent bilateral airspace opacities. No large pleural effusion. No pneumothorax.    Consult received and chart reviewed. Patient seen at bedside for swallow evaluation; patient alert and oriented to self only. Patient with difficulty making wants/needs known and following directions. Patient receiving oxygen via nasal cannula at 4L/min upon clinician's arrival.     Results and recommendations discussed with patient on unit. Called out to team.

## 2021-02-01 NOTE — PROGRESS NOTE ADULT - ASSESSMENT
71y/o M with PMHx HTN, HLD, poorly controlled T2DM, CAD (+stents), with COVID ARDS c/b superimposed bacterial PNA, extubated 1/31.       acute respiratory failure with hypoxia  due to covid-19 pneumonia and super imposed bacterial staph aureus, H. influenzae pneumonia   CAD s/p stents in ?2015- continue ASA, plavix, statin  IDDDm2  generalized weakness       wbc 15k, no fever, still on nasal canula 3-4 L/min       plan:   wean O2 as tolerated   Pt eval   continue Iv abx unasyn

## 2021-02-02 LAB
ALBUMIN SERPL ELPH-MCNC: 3.1 G/DL — LOW (ref 3.3–5)
ALP SERPL-CCNC: 61 U/L — SIGNIFICANT CHANGE UP (ref 40–120)
ALT FLD-CCNC: 14 U/L — SIGNIFICANT CHANGE UP (ref 4–41)
ANION GAP SERPL CALC-SCNC: 16 MMOL/L — HIGH (ref 7–14)
AST SERPL-CCNC: 14 U/L — SIGNIFICANT CHANGE UP (ref 4–40)
BASE EXCESS BLDV CALC-SCNC: -5.6 MMOL/L — LOW (ref -3–2)
BASOPHILS # BLD AUTO: 0.03 K/UL — SIGNIFICANT CHANGE UP (ref 0–0.2)
BASOPHILS NFR BLD AUTO: 0.2 % — SIGNIFICANT CHANGE UP (ref 0–2)
BILIRUB SERPL-MCNC: 0.7 MG/DL — SIGNIFICANT CHANGE UP (ref 0.2–1.2)
BUN SERPL-MCNC: 35 MG/DL — HIGH (ref 7–23)
CALCIUM SERPL-MCNC: 9.3 MG/DL — SIGNIFICANT CHANGE UP (ref 8.4–10.5)
CHLORIDE SERPL-SCNC: 109 MMOL/L — HIGH (ref 98–107)
CO2 SERPL-SCNC: 21 MMOL/L — LOW (ref 22–31)
CREAT SERPL-MCNC: 1.08 MG/DL — SIGNIFICANT CHANGE UP (ref 0.5–1.3)
EOSINOPHIL # BLD AUTO: 0.08 K/UL — SIGNIFICANT CHANGE UP (ref 0–0.5)
EOSINOPHIL NFR BLD AUTO: 0.6 % — SIGNIFICANT CHANGE UP (ref 0–6)
GLUCOSE BLDC GLUCOMTR-MCNC: 250 MG/DL — HIGH (ref 70–99)
GLUCOSE BLDC GLUCOMTR-MCNC: 294 MG/DL — HIGH (ref 70–99)
GLUCOSE BLDC GLUCOMTR-MCNC: 334 MG/DL — HIGH (ref 70–99)
GLUCOSE BLDC GLUCOMTR-MCNC: 339 MG/DL — HIGH (ref 70–99)
GLUCOSE BLDC GLUCOMTR-MCNC: 350 MG/DL — HIGH (ref 70–99)
GLUCOSE SERPL-MCNC: 249 MG/DL — HIGH (ref 70–99)
HCO3 BLDV-SCNC: 21 MMOL/L — SIGNIFICANT CHANGE UP (ref 20–27)
HCT VFR BLD CALC: 40.2 % — SIGNIFICANT CHANGE UP (ref 39–50)
HGB BLD-MCNC: 12.1 G/DL — LOW (ref 13–17)
IANC: 10.26 K/UL — HIGH (ref 1.5–8.5)
IMM GRANULOCYTES NFR BLD AUTO: 1.6 % — HIGH (ref 0–1.5)
LYMPHOCYTES # BLD AUTO: 1.65 K/UL — SIGNIFICANT CHANGE UP (ref 1–3.3)
LYMPHOCYTES # BLD AUTO: 12.2 % — LOW (ref 13–44)
MAGNESIUM SERPL-MCNC: 2.4 MG/DL — SIGNIFICANT CHANGE UP (ref 1.6–2.6)
MCHC RBC-ENTMCNC: 25.6 PG — LOW (ref 27–34)
MCHC RBC-ENTMCNC: 30.1 GM/DL — LOW (ref 32–36)
MCV RBC AUTO: 85 FL — SIGNIFICANT CHANGE UP (ref 80–100)
MONOCYTES # BLD AUTO: 1.28 K/UL — HIGH (ref 0–0.9)
MONOCYTES NFR BLD AUTO: 9.5 % — SIGNIFICANT CHANGE UP (ref 2–14)
NEUTROPHILS # BLD AUTO: 10.26 K/UL — HIGH (ref 1.8–7.4)
NEUTROPHILS NFR BLD AUTO: 75.9 % — SIGNIFICANT CHANGE UP (ref 43–77)
NRBC # BLD: 0 /100 WBCS — SIGNIFICANT CHANGE UP
NRBC # FLD: 0 K/UL — SIGNIFICANT CHANGE UP
PCO2 BLDV: 25 MMHG — LOW (ref 41–51)
PH BLDV: 7.46 — HIGH (ref 7.32–7.43)
PHOSPHATE SERPL-MCNC: 2.9 MG/DL — SIGNIFICANT CHANGE UP (ref 2.5–4.5)
PLATELET # BLD AUTO: 422 K/UL — HIGH (ref 150–400)
PO2 BLDV: 74 MMHG — HIGH (ref 35–40)
POTASSIUM SERPL-MCNC: 4.1 MMOL/L — SIGNIFICANT CHANGE UP (ref 3.5–5.3)
POTASSIUM SERPL-SCNC: 4.1 MMOL/L — SIGNIFICANT CHANGE UP (ref 3.5–5.3)
PROT SERPL-MCNC: 7.3 G/DL — SIGNIFICANT CHANGE UP (ref 6–8.3)
RBC # BLD: 4.73 M/UL — SIGNIFICANT CHANGE UP (ref 4.2–5.8)
RBC # FLD: 13.7 % — SIGNIFICANT CHANGE UP (ref 10.3–14.5)
SAO2 % BLDV: 95 % — HIGH (ref 60–85)
SODIUM SERPL-SCNC: 146 MMOL/L — HIGH (ref 135–145)
WBC # BLD: 13.51 K/UL — HIGH (ref 3.8–10.5)
WBC # FLD AUTO: 13.51 K/UL — HIGH (ref 3.8–10.5)

## 2021-02-02 PROCEDURE — 99232 SBSQ HOSP IP/OBS MODERATE 35: CPT

## 2021-02-02 PROCEDURE — 71045 X-RAY EXAM CHEST 1 VIEW: CPT | Mod: 26

## 2021-02-02 RX ORDER — LATANOPROST 0.05 MG/ML
1 SOLUTION/ DROPS OPHTHALMIC; TOPICAL AT BEDTIME
Refills: 0 | Status: DISCONTINUED | OUTPATIENT
Start: 2021-02-02 | End: 2021-02-10

## 2021-02-02 RX ORDER — AMLODIPINE BESYLATE 2.5 MG/1
10 TABLET ORAL DAILY
Refills: 0 | Status: DISCONTINUED | OUTPATIENT
Start: 2021-02-03 | End: 2021-02-10

## 2021-02-02 RX ORDER — DORZOLAMIDE HYDROCHLORIDE 20 MG/ML
1 SOLUTION/ DROPS OPHTHALMIC EVERY 8 HOURS
Refills: 0 | Status: DISCONTINUED | OUTPATIENT
Start: 2021-02-02 | End: 2021-02-10

## 2021-02-02 RX ORDER — INSULIN LISPRO 100/ML
VIAL (ML) SUBCUTANEOUS AT BEDTIME
Refills: 0 | Status: DISCONTINUED | OUTPATIENT
Start: 2021-02-02 | End: 2021-02-03

## 2021-02-02 RX ORDER — INSULIN LISPRO 100/ML
7 VIAL (ML) SUBCUTANEOUS
Refills: 0 | Status: DISCONTINUED | OUTPATIENT
Start: 2021-02-02 | End: 2021-02-04

## 2021-02-02 RX ORDER — ACETAMINOPHEN 500 MG
650 TABLET ORAL EVERY 6 HOURS
Refills: 0 | Status: DISCONTINUED | OUTPATIENT
Start: 2021-02-02 | End: 2021-02-10

## 2021-02-02 RX ORDER — PIPERACILLIN AND TAZOBACTAM 4; .5 G/20ML; G/20ML
3.38 INJECTION, POWDER, LYOPHILIZED, FOR SOLUTION INTRAVENOUS ONCE
Refills: 0 | Status: COMPLETED | OUTPATIENT
Start: 2021-02-02 | End: 2021-02-02

## 2021-02-02 RX ORDER — INSULIN GLARGINE 100 [IU]/ML
20 INJECTION, SOLUTION SUBCUTANEOUS AT BEDTIME
Refills: 0 | Status: DISCONTINUED | OUTPATIENT
Start: 2021-02-02 | End: 2021-02-03

## 2021-02-02 RX ORDER — PIPERACILLIN AND TAZOBACTAM 4; .5 G/20ML; G/20ML
3.38 INJECTION, POWDER, LYOPHILIZED, FOR SOLUTION INTRAVENOUS EVERY 8 HOURS
Refills: 0 | Status: COMPLETED | OUTPATIENT
Start: 2021-02-02 | End: 2021-02-09

## 2021-02-02 RX ORDER — INSULIN LISPRO 100/ML
VIAL (ML) SUBCUTANEOUS
Refills: 0 | Status: DISCONTINUED | OUTPATIENT
Start: 2021-02-02 | End: 2021-02-03

## 2021-02-02 RX ORDER — FUROSEMIDE 40 MG
20 TABLET ORAL ONCE
Refills: 0 | Status: COMPLETED | OUTPATIENT
Start: 2021-02-02 | End: 2021-02-02

## 2021-02-02 RX ORDER — HYDRALAZINE HCL 50 MG
10 TABLET ORAL ONCE
Refills: 0 | Status: COMPLETED | OUTPATIENT
Start: 2021-02-02 | End: 2021-02-02

## 2021-02-02 RX ORDER — AMLODIPINE BESYLATE 2.5 MG/1
5 TABLET ORAL DAILY
Refills: 0 | Status: DISCONTINUED | OUTPATIENT
Start: 2021-02-02 | End: 2021-02-02

## 2021-02-02 RX ADMIN — Medication 20 MILLIGRAM(S): at 15:26

## 2021-02-02 RX ADMIN — Medication 145 MILLIGRAM(S): at 14:24

## 2021-02-02 RX ADMIN — ENOXAPARIN SODIUM 40 MILLIGRAM(S): 100 INJECTION SUBCUTANEOUS at 14:24

## 2021-02-02 RX ADMIN — DORZOLAMIDE HYDROCHLORIDE 1 DROP(S): 20 SOLUTION/ DROPS OPHTHALMIC at 21:55

## 2021-02-02 RX ADMIN — CLOPIDOGREL BISULFATE 75 MILLIGRAM(S): 75 TABLET, FILM COATED ORAL at 14:23

## 2021-02-02 RX ADMIN — Medication 8: at 19:01

## 2021-02-02 RX ADMIN — Medication 650 MILLIGRAM(S): at 15:27

## 2021-02-02 RX ADMIN — LATANOPROST 1 DROP(S): 0.05 SOLUTION/ DROPS OPHTHALMIC; TOPICAL at 21:54

## 2021-02-02 RX ADMIN — Medication 81 MILLIGRAM(S): at 14:23

## 2021-02-02 RX ADMIN — Medication 10 MILLIGRAM(S): at 17:50

## 2021-02-02 RX ADMIN — Medication 6: at 12:44

## 2021-02-02 RX ADMIN — INSULIN GLARGINE 20 UNIT(S): 100 INJECTION, SOLUTION SUBCUTANEOUS at 21:53

## 2021-02-02 RX ADMIN — Medication 10 MILLIGRAM(S): at 13:46

## 2021-02-02 RX ADMIN — Medication 2: at 21:54

## 2021-02-02 RX ADMIN — PIPERACILLIN AND TAZOBACTAM 200 GRAM(S): 4; .5 INJECTION, POWDER, LYOPHILIZED, FOR SOLUTION INTRAVENOUS at 16:49

## 2021-02-02 RX ADMIN — Medication 4: at 07:52

## 2021-02-02 RX ADMIN — PIPERACILLIN AND TAZOBACTAM 25 GRAM(S): 4; .5 INJECTION, POWDER, LYOPHILIZED, FOR SOLUTION INTRAVENOUS at 22:31

## 2021-02-02 NOTE — PROGRESS NOTE ADULT - SUBJECTIVE AND OBJECTIVE BOX
Chief Complaint:     History:    MEDICATIONS  (STANDING):  ampicillin/sulbactam  IVPB 3 Gram(s) IV Intermittent every 6 hours  aspirin  chewable 81 milliGRAM(s) Oral daily  clopidogrel Tablet 75 milliGRAM(s) Oral daily  dextrose 50% Injectable 25 Gram(s) IV Push once  dextrose 50% Injectable 12.5 Gram(s) IV Push once  enoxaparin Injectable 40 milliGRAM(s) SubCutaneous daily  fenofibrate Tablet 145 milliGRAM(s) Oral daily  glucagon  Injectable 1 milliGRAM(s) IntraMuscular once  insulin lispro (ADMELOG) corrective regimen sliding scale   SubCutaneous every 6 hours  simvastatin 20 milliGRAM(s) Oral at bedtime  tamsulosin 0.4 milliGRAM(s) Oral at bedtime    MEDICATIONS  (PRN):  haloperidol    Injectable 5 milliGRAM(s) IV Push every 6 hours PRN Agitation  hydrALAZINE Injectable 10 milliGRAM(s) IV Push every 6 hours PRN SBP >180      Allergies    No Known Allergies    Intolerances      Review of Systems:  Constitutional: No fever  Eyes: No blurry vision  Neuro: No tremors  HEENT: No pain  Cardiovascular: No chest pain, palpitations  Respiratory: No SOB, no cough  GI: No nausea, vomiting, abdominal pain  : No dysuria  Skin: no rash  Psych: no depression  Endocrine: no polyuria, polydipsia  Hem/lymph: no swelling  Osteoporosis: no fractures    ALL OTHER SYSTEMS REVIEWED AND NEGATIVE    UNABLE TO OBTAIN    PHYSICAL EXAM:  VITALS: T(C): 37.2 (02-02-21 @ 05:23)  T(F): 98.9 (02-02-21 @ 05:23), Max: 99.3 (02-01-21 @ 22:25)  HR: 98 (02-02-21 @ 05:23) (95 - 102)  BP: 157/67 (02-02-21 @ 05:23) (157/67 - 163/81)  RR:  (16 - 20)  SpO2:  (92% - 94%)  Wt(kg): --  GENERAL: NAD, well-groomed, well-developed  EYES: No proptosis, no lid lag, anicteric  HEENT:  Atraumatic, Normocephalic, moist mucous membranes  THYROID: Normal size, no palpable nodules  RESPIRATORY: Clear to auscultation bilaterally; No rales, rhonchi, wheezing, or rubs  CARDIOVASCULAR: Regular rate and rhythm; No murmurs; no peripheral edema  GI: Soft, nontender, non distended, normal bowel sounds  SKIN: Dry, intact, No rashes or lesions  MUSCULOSKELETAL: Full range of motion, normal strength  NEURO: sensation intact, extraocular movements intact, no tremor, normal reflexes  PSYCH: Alert and oriented x 3, normal affect, normal mood  CUSHING'S SIGNS: no striae    POCT Blood Glucose.: 250 mg/dL (02-02-21 @ 07:50)  POCT Blood Glucose.: 209 mg/dL (02-01-21 @ 23:19)  POCT Blood Glucose.: 216 mg/dL (02-01-21 @ 21:29)  POCT Blood Glucose.: 224 mg/dL (02-01-21 @ 17:57)  POCT Blood Glucose.: 198 mg/dL (02-01-21 @ 12:26)  POCT Blood Glucose.: 139 mg/dL (02-01-21 @ 05:50)  POCT Blood Glucose.: 120 mg/dL (02-01-21 @ 03:11)  POCT Blood Glucose.: 101 mg/dL (02-01-21 @ 00:11)  POCT Blood Glucose.: 99 mg/dL (01-31-21 @ 21:19)  POCT Blood Glucose.: 108 mg/dL (01-31-21 @ 11:29)  POCT Blood Glucose.: 127 mg/dL (01-31-21 @ 04:31)  POCT Blood Glucose.: 83 mg/dL (01-30-21 @ 23:08)  POCT Blood Glucose.: 77 mg/dL (01-30-21 @ 19:44)  POCT Blood Glucose.: 62 mg/dL (01-30-21 @ 19:42)  POCT Blood Glucose.: 76 mg/dL (01-30-21 @ 17:46)      02-02    146<H>  |  109<H>  |  35<H>  ----------------------------<  249<H>  4.1   |  21<L>  |  1.08    EGFR if : 79  EGFR if non : 68    Ca    9.3      02-02  Mg     2.4     02-02  Phos  2.9     02-02    TPro  7.3  /  Alb  3.1<L>  /  TBili  0.7  /  DBili  x   /  AST  14  /  ALT  14  /  AlkPhos  61  02-02          Thyroid Function Tests:                           Chief Complaint: DM2    History: No acute events. No hypoglycemia. Tolerating dysphagia diet.     MEDICATIONS  (STANDING):  ampicillin/sulbactam  IVPB 3 Gram(s) IV Intermittent every 6 hours  aspirin  chewable 81 milliGRAM(s) Oral daily  clopidogrel Tablet 75 milliGRAM(s) Oral daily  dextrose 50% Injectable 25 Gram(s) IV Push once  dextrose 50% Injectable 12.5 Gram(s) IV Push once  enoxaparin Injectable 40 milliGRAM(s) SubCutaneous daily  fenofibrate Tablet 145 milliGRAM(s) Oral daily  glucagon  Injectable 1 milliGRAM(s) IntraMuscular once  insulin lispro (ADMELOG) corrective regimen sliding scale   SubCutaneous every 6 hours  simvastatin 20 milliGRAM(s) Oral at bedtime  tamsulosin 0.4 milliGRAM(s) Oral at bedtime    MEDICATIONS  (PRN):  haloperidol    Injectable 5 milliGRAM(s) IV Push every 6 hours PRN Agitation  hydrALAZINE Injectable 10 milliGRAM(s) IV Push every 6 hours PRN SBP >180      Allergies    No Known Allergies    Intolerances      Review of Systems:  Constitutional: No fever  Eyes: No blurry vision  Neuro: No tremors  HEENT: No pain  Cardiovascular: No chest pain, palpitations  Respiratory: No SOB, no cough  GI: No nausea, vomiting, abdominal pain  : No dysuria  Skin: no rash  Psych: no depression  Endocrine: no polyuria, polydipsia      ALL OTHER SYSTEMS REVIEWED AND NEGATIVE        PHYSICAL EXAM:  VITALS: T(C): 37.2 (02-02-21 @ 05:23)  T(F): 98.9 (02-02-21 @ 05:23), Max: 99.3 (02-01-21 @ 22:25)  HR: 98 (02-02-21 @ 05:23) (95 - 102)  BP: 157/67 (02-02-21 @ 05:23) (157/67 - 163/81)  RR:  (16 - 20)  SpO2:  (92% - 94%)  Wt(kg): --  GENERAL: NAD, well-groomed, well-developed  EYES: No proptosis, no lid lag, anicteric  HEENT:  Atraumatic, Normocephalic, moist mucous membranes  RESPIRATORY: Decreased BS  CARDIOVASCULAR: Regular rate and rhythm  GI: Soft, nontender, non distended, normal bowel sounds      POCT Blood Glucose.: 250 mg/dL (02-02-21 @ 07:50)  POCT Blood Glucose.: 209 mg/dL (02-01-21 @ 23:19)  POCT Blood Glucose.: 216 mg/dL (02-01-21 @ 21:29)  POCT Blood Glucose.: 224 mg/dL (02-01-21 @ 17:57)  POCT Blood Glucose.: 198 mg/dL (02-01-21 @ 12:26)  POCT Blood Glucose.: 139 mg/dL (02-01-21 @ 05:50)  POCT Blood Glucose.: 120 mg/dL (02-01-21 @ 03:11)  POCT Blood Glucose.: 101 mg/dL (02-01-21 @ 00:11)  POCT Blood Glucose.: 99 mg/dL (01-31-21 @ 21:19)  POCT Blood Glucose.: 108 mg/dL (01-31-21 @ 11:29)  POCT Blood Glucose.: 127 mg/dL (01-31-21 @ 04:31)  POCT Blood Glucose.: 83 mg/dL (01-30-21 @ 23:08)  POCT Blood Glucose.: 77 mg/dL (01-30-21 @ 19:44)  POCT Blood Glucose.: 62 mg/dL (01-30-21 @ 19:42)  POCT Blood Glucose.: 76 mg/dL (01-30-21 @ 17:46)      02-02    146<H>  |  109<H>  |  35<H>  ----------------------------<  249<H>  4.1   |  21<L>  |  1.08    EGFR if : 79  EGFR if non : 68    Ca    9.3      02-02  Mg     2.4     02-02  Phos  2.9     02-02    TPro  7.3  /  Alb  3.1<L>  /  TBili  0.7  /  DBili  x   /  AST  14  /  ALT  14  /  AlkPhos  61  02-02

## 2021-02-02 NOTE — PROGRESS NOTE ADULT - ASSESSMENT
71y/o M with PMHx HTN, HLD, poorly controlled T2DM, CAD (+stents), with COVID ARDS c/b superimposed bacterial PNA, extubated 1/31.       acute respiratory failure with hypoxia  due to covid-19 pneumonia and super imposed bacterial staph aureus, H. influenzae pneumonia   CAD s/p stents in ?2015- continue ASA, plavix, statin  IDDDm2   generalized weakness   oropharyngeal dysphagia on dysphagia 1     febrile 100.6F,   wbc 13k trended down 15k, still on nasal canula 3-4 L/min , tachycardia 122 bpm     seems he aspirated again, in addition to  atelectasis in the right lower lobe worsening     plan:   norvasc 10mg daily   chest xray and ABG , rule out hypercapnic failure   incentive spirometry   IV unaysn switched to zosyn   continue dyshagia diet, and aspiration precautions   wean O2 as tolerated         69y/o M with PMHx HTN, HLD, poorly controlled T2DM, CAD (+stents), with COVID ARDS c/b superimposed bacterial PNA, extubated 1/31.       acute respiratory failure with hypoxia  due to covid-19 pneumonia and super imposed bacterial staph aureus, H. influenzae pneumonia   CAD s/p stents in ?2015- continue ASA, plavix, statin  IDDDm2   generalized weakness   oropharyngeal dysphagia on dysphagia 1     febrile 100.6F,   wbc 13k trended down 15k, still on nasal canula 3-4 L/min , tachycardia 122 bpm     seems he aspirated again, in addition to  atelectasis in the right lower lobe worsening     plan:   norvasc 10mg daily   chest xray and ABG , rule out hypercapnic failure   incentive spirometry   IV unaysn switched to zosyn   continue dyshagia diet, and aspiration precautions   wean O2 as tolerated   OOB , continue PT, he is going to need STR due to decline in functional status

## 2021-02-02 NOTE — PROGRESS NOTE ADULT - ATTENDING COMMENTS
Marianela Gross (pager [phone])  On evenings and weekends, please call [phone] or page endocrine fellow on call.   Please note that this patient may be followed by different provider tomorrow. If no answer, contact endocrine fellow on call.

## 2021-02-02 NOTE — PROGRESS NOTE ADULT - PROBLEM SELECTOR PLAN 1
- A1c 9.6%  - now on continuous 24 hour tube feeds  - adjust NPH to 55 units q6 and hold if tube feeds are held  - moderate correction scale q6  - will follow  - for discharge: plan to dc on insulin, recs TBD. - A1c 9.6%  - now on dysphagia diet  - Recommend starting Lantus 20 Units HS and Admelog 7 Units TIDAC  - moderate correction scale q6  - will follow  - for discharge: plan to dc on insulin, recs TBD.

## 2021-02-02 NOTE — PROGRESS NOTE ADULT - SUBJECTIVE AND OBJECTIVE BOX
PROGRESS NOTE:     Patient is a 70y old  Male who presents with a chief complaint of Acute hypoxic respiratory failure 2' COVID-19 PNA (02 Feb 2021 11:01)      SUBJECTIVE / OVERNIGHT EVENTS:  reports fatigue, cough , sob    ADDITIONAL REVIEW OF SYSTEMS:  fever     MEDICATIONS  (STANDING):  aspirin  chewable 81 milliGRAM(s) Oral daily  clopidogrel Tablet 75 milliGRAM(s) Oral daily  dextrose 50% Injectable 25 Gram(s) IV Push once  dextrose 50% Injectable 12.5 Gram(s) IV Push once  dorzolamide 2% Ophthalmic Solution 1 Drop(s) Both EYES every 8 hours  enoxaparin Injectable 40 milliGRAM(s) SubCutaneous daily  fenofibrate Tablet 145 milliGRAM(s) Oral daily  glucagon  Injectable 1 milliGRAM(s) IntraMuscular once  insulin glargine Injectable (LANTUS) 20 Unit(s) SubCutaneous at bedtime  insulin lispro (ADMELOG) corrective regimen sliding scale   SubCutaneous three times a day before meals  insulin lispro (ADMELOG) corrective regimen sliding scale   SubCutaneous at bedtime  insulin lispro Injectable (ADMELOG) 7 Unit(s) SubCutaneous three times a day before meals  latanoprost 0.005% Ophthalmic Solution 1 Drop(s) Both EYES at bedtime  piperacillin/tazobactam IVPB.. 3.375 Gram(s) IV Intermittent every 8 hours  simvastatin 20 milliGRAM(s) Oral at bedtime  tamsulosin 0.4 milliGRAM(s) Oral at bedtime    MEDICATIONS  (PRN):  acetaminophen   Tablet .. 650 milliGRAM(s) Oral every 6 hours PRN Temp greater or equal to 38C (100.4F), Mild Pain (1 - 3), Moderate Pain (4 - 6), Severe Pain (7 - 10)  haloperidol    Injectable 5 milliGRAM(s) IV Push every 6 hours PRN Agitation  hydrALAZINE Injectable 10 milliGRAM(s) IV Push every 6 hours PRN SBP >180      CAPILLARY BLOOD GLUCOSE      POCT Blood Glucose.: 334 mg/dL (02 Feb 2021 16:58)  POCT Blood Glucose.: 294 mg/dL (02 Feb 2021 12:15)  POCT Blood Glucose.: 250 mg/dL (02 Feb 2021 07:50)  POCT Blood Glucose.: 209 mg/dL (01 Feb 2021 23:19)  POCT Blood Glucose.: 216 mg/dL (01 Feb 2021 21:29)    I&O's Summary    01 Feb 2021 07:01  -  02 Feb 2021 07:00  --------------------------------------------------------  IN: 0 mL / OUT: 1990 mL / NET: -1990 mL    02 Feb 2021 07:01  -  02 Feb 2021 18:32  --------------------------------------------------------  IN: 0 mL / OUT: 460 mL / NET: -460 mL        PHYSICAL EXAM:  Vital Signs Last 24 Hrs  T(C): 37.9 (02 Feb 2021 16:48), Max: 38.1 (02 Feb 2021 13:38)  T(F): 100.3 (02 Feb 2021 16:48), Max: 100.6 (02 Feb 2021 13:38)  HR: 110 (02 Feb 2021 18:18) (98 - 120)  BP: 153/69 (02 Feb 2021 18:18) (153/69 - 200/98)  BP(mean): --  RR: 20 (02 Feb 2021 16:48) (18 - 22)  SpO2: 94% (02 Feb 2021 16:48) (92% - 94%)    awake, alert orientedx2  poor inspiratory effort, tachypnea  diminished air entry bilateral faint rhonchi on right side   and tachycardia S1,S2 RRR      LABS:                        12.1   13.51 )-----------( 422      ( 02 Feb 2021 06:53 )             40.2     02-02    146<H>  |  109<H>  |  35<H>  ----------------------------<  249<H>  4.1   |  21<L>  |  1.08    Ca    9.3      02 Feb 2021 06:53  Phos  2.9     02-02  Mg     2.4     02-02    TPro  7.3  /  Alb  3.1<L>  /  TBili  0.7  /  DBili  x   /  AST  14  /  ALT  14  /  AlkPhos  61  02-02      RADIOLOGY & ADDITIONAL TESTS:  Results Reviewed: y  Imaging Personally Reviewed:y  Electrocardiogram Personally Reviewed:y    COORDINATION OF CARE:  Care Discussed with Consultants/Other Providers [Y/N]:y  Prior or Outpatient Records Reviewed [Y/N]:y

## 2021-02-02 NOTE — PROGRESS NOTE ADULT - ASSESSMENT
70 year old man with PMH CAD s/p stents x3 (2015), uncontrolled DM2, HTN, HLD, BPH who presents to the hospital with worsening SOB, admitted with COVID infection, now intubated. Consult called for management of uncontrolled DM2 in critically ill patient intubated for COVID infection. BG goal 140-180 mg/dL in ICU patient. High risk patient with high level decision making.      70 year old man with PMH CAD s/p stents x3 (2015), uncontrolled DM2, HTN, HLD, BPH who presents to the hospital with worsening SOB, admitted with COVID infection, now intubated. Consult called for management of uncontrolled DM2 in critically ill patient initially intubated for COVID infection. Pt now extubated on O2 via NC. BG goal 100-180. High risk patient with high level decision making.

## 2021-02-03 LAB
ANION GAP SERPL CALC-SCNC: 12 MMOL/L — SIGNIFICANT CHANGE UP (ref 7–14)
ANION GAP SERPL CALC-SCNC: 20 MMOL/L — HIGH (ref 7–14)
B-OH-BUTYR SERPL-SCNC: 0.2 MMOL/L — SIGNIFICANT CHANGE UP (ref 0–0.4)
BUN SERPL-MCNC: 46 MG/DL — HIGH (ref 7–23)
BUN SERPL-MCNC: 50 MG/DL — HIGH (ref 7–23)
CALCIUM SERPL-MCNC: 9.5 MG/DL — SIGNIFICANT CHANGE UP (ref 8.4–10.5)
CALCIUM SERPL-MCNC: 9.6 MG/DL — SIGNIFICANT CHANGE UP (ref 8.4–10.5)
CHLORIDE SERPL-SCNC: 107 MMOL/L — SIGNIFICANT CHANGE UP (ref 98–107)
CHLORIDE SERPL-SCNC: 114 MMOL/L — HIGH (ref 98–107)
CO2 SERPL-SCNC: 20 MMOL/L — LOW (ref 22–31)
CO2 SERPL-SCNC: 24 MMOL/L — SIGNIFICANT CHANGE UP (ref 22–31)
CREAT SERPL-MCNC: 1.2 MG/DL — SIGNIFICANT CHANGE UP (ref 0.5–1.3)
CREAT SERPL-MCNC: 1.32 MG/DL — HIGH (ref 0.5–1.3)
GLUCOSE BLDC GLUCOMTR-MCNC: 299 MG/DL — HIGH (ref 70–99)
GLUCOSE BLDC GLUCOMTR-MCNC: 340 MG/DL — HIGH (ref 70–99)
GLUCOSE BLDC GLUCOMTR-MCNC: 342 MG/DL — HIGH (ref 70–99)
GLUCOSE BLDC GLUCOMTR-MCNC: 382 MG/DL — HIGH (ref 70–99)
GLUCOSE BLDC GLUCOMTR-MCNC: 390 MG/DL — HIGH (ref 70–99)
GLUCOSE SERPL-MCNC: 360 MG/DL — HIGH (ref 70–99)
GLUCOSE SERPL-MCNC: 383 MG/DL — HIGH (ref 70–99)
HCT VFR BLD CALC: 47.5 % — SIGNIFICANT CHANGE UP (ref 39–50)
HGB BLD-MCNC: 14 G/DL — SIGNIFICANT CHANGE UP (ref 13–17)
LACTATE SERPL-SCNC: 1.8 MMOL/L — SIGNIFICANT CHANGE UP (ref 0.5–2)
MCHC RBC-ENTMCNC: 25.3 PG — LOW (ref 27–34)
MCHC RBC-ENTMCNC: 29.5 GM/DL — LOW (ref 32–36)
MCV RBC AUTO: 85.9 FL — SIGNIFICANT CHANGE UP (ref 80–100)
NRBC # BLD: 0 /100 WBCS — SIGNIFICANT CHANGE UP
NRBC # FLD: 0 K/UL — SIGNIFICANT CHANGE UP
PLATELET # BLD AUTO: 591 K/UL — HIGH (ref 150–400)
POTASSIUM SERPL-MCNC: 3.5 MMOL/L — SIGNIFICANT CHANGE UP (ref 3.5–5.3)
POTASSIUM SERPL-MCNC: 3.9 MMOL/L — SIGNIFICANT CHANGE UP (ref 3.5–5.3)
POTASSIUM SERPL-SCNC: 3.5 MMOL/L — SIGNIFICANT CHANGE UP (ref 3.5–5.3)
POTASSIUM SERPL-SCNC: 3.9 MMOL/L — SIGNIFICANT CHANGE UP (ref 3.5–5.3)
RBC # BLD: 5.53 M/UL — SIGNIFICANT CHANGE UP (ref 4.2–5.8)
RBC # FLD: 13.9 % — SIGNIFICANT CHANGE UP (ref 10.3–14.5)
SODIUM SERPL-SCNC: 147 MMOL/L — HIGH (ref 135–145)
SODIUM SERPL-SCNC: 150 MMOL/L — HIGH (ref 135–145)
WBC # BLD: 15.44 K/UL — HIGH (ref 3.8–10.5)
WBC # FLD AUTO: 15.44 K/UL — HIGH (ref 3.8–10.5)

## 2021-02-03 PROCEDURE — 99223 1ST HOSP IP/OBS HIGH 75: CPT

## 2021-02-03 PROCEDURE — 99232 SBSQ HOSP IP/OBS MODERATE 35: CPT

## 2021-02-03 RX ORDER — HYDRALAZINE HCL 50 MG
5 TABLET ORAL ONCE
Refills: 0 | Status: COMPLETED | OUTPATIENT
Start: 2021-02-03 | End: 2021-02-03

## 2021-02-03 RX ORDER — SODIUM CHLORIDE 9 MG/ML
1000 INJECTION, SOLUTION INTRAVENOUS
Refills: 0 | Status: DISCONTINUED | OUTPATIENT
Start: 2021-02-03 | End: 2021-02-04

## 2021-02-03 RX ORDER — ISOSORBIDE MONONITRATE 60 MG/1
60 TABLET, EXTENDED RELEASE ORAL DAILY
Refills: 0 | Status: DISCONTINUED | OUTPATIENT
Start: 2021-02-03 | End: 2021-02-10

## 2021-02-03 RX ORDER — SODIUM CHLORIDE 9 MG/ML
1000 INJECTION, SOLUTION INTRAVENOUS
Refills: 0 | Status: DISCONTINUED | OUTPATIENT
Start: 2021-02-03 | End: 2021-02-03

## 2021-02-03 RX ORDER — KETOROLAC TROMETHAMINE 30 MG/ML
15 SYRINGE (ML) INJECTION ONCE
Refills: 0 | Status: DISCONTINUED | OUTPATIENT
Start: 2021-02-03 | End: 2021-02-03

## 2021-02-03 RX ORDER — ACETAMINOPHEN 500 MG
1000 TABLET ORAL ONCE
Refills: 0 | Status: COMPLETED | OUTPATIENT
Start: 2021-02-03 | End: 2021-02-03

## 2021-02-03 RX ORDER — INSULIN LISPRO 100/ML
VIAL (ML) SUBCUTANEOUS EVERY 4 HOURS
Refills: 0 | Status: DISCONTINUED | OUTPATIENT
Start: 2021-02-03 | End: 2021-02-04

## 2021-02-03 RX ORDER — HUMAN INSULIN 100 [IU]/ML
8 INJECTION, SUSPENSION SUBCUTANEOUS ONCE
Refills: 0 | Status: COMPLETED | OUTPATIENT
Start: 2021-02-03 | End: 2021-02-03

## 2021-02-03 RX ORDER — ISOSORBIDE MONONITRATE 60 MG/1
60 TABLET, EXTENDED RELEASE ORAL DAILY
Refills: 0 | Status: DISCONTINUED | OUTPATIENT
Start: 2021-02-03 | End: 2021-02-03

## 2021-02-03 RX ORDER — INSULIN GLARGINE 100 [IU]/ML
40 INJECTION, SOLUTION SUBCUTANEOUS AT BEDTIME
Refills: 0 | Status: DISCONTINUED | OUTPATIENT
Start: 2021-02-03 | End: 2021-02-05

## 2021-02-03 RX ORDER — VANCOMYCIN HCL 1 G
1000 VIAL (EA) INTRAVENOUS ONCE
Refills: 0 | Status: COMPLETED | OUTPATIENT
Start: 2021-02-03 | End: 2021-02-03

## 2021-02-03 RX ORDER — INSULIN LISPRO 100/ML
VIAL (ML) SUBCUTANEOUS EVERY 6 HOURS
Refills: 0 | Status: DISCONTINUED | OUTPATIENT
Start: 2021-02-03 | End: 2021-02-03

## 2021-02-03 RX ADMIN — LATANOPROST 1 DROP(S): 0.05 SOLUTION/ DROPS OPHTHALMIC; TOPICAL at 23:28

## 2021-02-03 RX ADMIN — Medication 400 MILLIGRAM(S): at 09:03

## 2021-02-03 RX ADMIN — Medication 110 MILLIGRAM(S): at 13:02

## 2021-02-03 RX ADMIN — Medication 5 MILLIGRAM(S): at 05:51

## 2021-02-03 RX ADMIN — INSULIN GLARGINE 40 UNIT(S): 100 INJECTION, SOLUTION SUBCUTANEOUS at 23:27

## 2021-02-03 RX ADMIN — Medication 250 MILLIGRAM(S): at 16:20

## 2021-02-03 RX ADMIN — PIPERACILLIN AND TAZOBACTAM 25 GRAM(S): 4; .5 INJECTION, POWDER, LYOPHILIZED, FOR SOLUTION INTRAVENOUS at 14:00

## 2021-02-03 RX ADMIN — DORZOLAMIDE HYDROCHLORIDE 1 DROP(S): 20 SOLUTION/ DROPS OPHTHALMIC at 06:03

## 2021-02-03 RX ADMIN — SODIUM CHLORIDE 125 MILLILITER(S): 9 INJECTION, SOLUTION INTRAVENOUS at 18:07

## 2021-02-03 RX ADMIN — DORZOLAMIDE HYDROCHLORIDE 1 DROP(S): 20 SOLUTION/ DROPS OPHTHALMIC at 23:28

## 2021-02-03 RX ADMIN — SODIUM CHLORIDE 85 MILLILITER(S): 9 INJECTION, SOLUTION INTRAVENOUS at 12:54

## 2021-02-03 RX ADMIN — Medication 15 MILLIGRAM(S): at 18:07

## 2021-02-03 RX ADMIN — PIPERACILLIN AND TAZOBACTAM 25 GRAM(S): 4; .5 INJECTION, POWDER, LYOPHILIZED, FOR SOLUTION INTRAVENOUS at 06:03

## 2021-02-03 RX ADMIN — Medication 8: at 05:55

## 2021-02-03 RX ADMIN — Medication 10: at 18:35

## 2021-02-03 RX ADMIN — DORZOLAMIDE HYDROCHLORIDE 1 DROP(S): 20 SOLUTION/ DROPS OPHTHALMIC at 12:56

## 2021-02-03 RX ADMIN — Medication 8: at 12:53

## 2021-02-03 RX ADMIN — ENOXAPARIN SODIUM 40 MILLIGRAM(S): 100 INJECTION SUBCUTANEOUS at 12:52

## 2021-02-03 RX ADMIN — SODIUM CHLORIDE 125 MILLILITER(S): 9 INJECTION, SOLUTION INTRAVENOUS at 23:27

## 2021-02-03 RX ADMIN — PIPERACILLIN AND TAZOBACTAM 25 GRAM(S): 4; .5 INJECTION, POWDER, LYOPHILIZED, FOR SOLUTION INTRAVENOUS at 23:28

## 2021-02-03 RX ADMIN — Medication 400 MILLIGRAM(S): at 14:21

## 2021-02-03 RX ADMIN — HUMAN INSULIN 8 UNIT(S): 100 INJECTION, SUSPENSION SUBCUTANEOUS at 12:50

## 2021-02-03 NOTE — PROVIDER CONTACT NOTE (OTHER) - RECOMMENDATIONS
Toradol for fever
PT ordered for tylenol PO. NPO for difficulty swallowing.
PT unable to get motrin as he is NPO. Unable to swallow at this time.
Insulin bag changed and Insulin drip increased
Give hydralazine and tylenol, apply ice packs, recheck BP
Give hydralazine again and recheck BP

## 2021-02-03 NOTE — PROGRESS NOTE ADULT - ASSESSMENT
70 year old man with PMH CAD s/p stents x3 (2015), uncontrolled DM2, HTN, HLD, BPH who presents to the hospital with worsening SOB, admitted with COVID infection, now intubated. Consult called for management of uncontrolled DM2 in critically ill patient initially intubated for COVID infection. Pt now extubated on O2 via NC. BG goal 100-180. High risk patient with high level decision making.

## 2021-02-03 NOTE — PROGRESS NOTE ADULT - SUBJECTIVE AND OBJECTIVE BOX
PROGRESS NOTE:     Patient is a 70y old  Male who presents with a chief complaint of Acute hypoxic respiratory failure 2' COVID-19 PNA (03 Feb 2021 14:24)      SUBJECTIVE / OVERNIGHT EVENTS:  fever , weak , reports cough and sob    ADDITIONAL REVIEW OF SYSTEMS:  denies pain   MEDICATIONS  (STANDING):  amLODIPine   Tablet 10 milliGRAM(s) Oral daily  aspirin  chewable 81 milliGRAM(s) Oral daily  clopidogrel Tablet 75 milliGRAM(s) Oral daily  dextrose 50% Injectable 25 Gram(s) IV Push once  dextrose 50% Injectable 12.5 Gram(s) IV Push once  dorzolamide 2% Ophthalmic Solution 1 Drop(s) Both EYES every 8 hours  enoxaparin Injectable 40 milliGRAM(s) SubCutaneous daily  fenofibrate Tablet 145 milliGRAM(s) Oral daily  glucagon  Injectable 1 milliGRAM(s) IntraMuscular once  insulin glargine Injectable (LANTUS) 40 Unit(s) SubCutaneous at bedtime  insulin lispro (ADMELOG) corrective regimen sliding scale   SubCutaneous every 4 hours  insulin lispro Injectable (ADMELOG) 7 Unit(s) SubCutaneous three times a day before meals  isosorbide   mononitrate ER Tablet (IMDUR) 60 milliGRAM(s) Oral daily  ketorolac   Injectable 15 milliGRAM(s) IV Push once  lactated ringers. 1000 milliLiter(s) (85 mL/Hr) IV Continuous <Continuous>  latanoprost 0.005% Ophthalmic Solution 1 Drop(s) Both EYES at bedtime  piperacillin/tazobactam IVPB.. 3.375 Gram(s) IV Intermittent every 8 hours  simvastatin 20 milliGRAM(s) Oral at bedtime  tamsulosin 0.4 milliGRAM(s) Oral at bedtime    MEDICATIONS  (PRN):  acetaminophen   Tablet .. 650 milliGRAM(s) Oral every 6 hours PRN Temp greater or equal to 38C (100.4F), Mild Pain (1 - 3), Moderate Pain (4 - 6), Severe Pain (7 - 10)  haloperidol    Injectable 5 milliGRAM(s) IV Push every 6 hours PRN Agitation  hydrALAZINE Injectable 10 milliGRAM(s) IV Push every 6 hours PRN SBP >180      CAPILLARY BLOOD GLUCOSE      POCT Blood Glucose.: 340 mg/dL (03 Feb 2021 12:36)  POCT Blood Glucose.: 342 mg/dL (03 Feb 2021 05:50)  POCT Blood Glucose.: 382 mg/dL (03 Feb 2021 04:21)  POCT Blood Glucose.: 339 mg/dL (02 Feb 2021 21:21)  POCT Blood Glucose.: 350 mg/dL (02 Feb 2021 19:00)    I&O's Summary    02 Feb 2021 07:01  -  03 Feb 2021 07:00  --------------------------------------------------------  IN: 0 mL / OUT: 2160 mL / NET: -2160 mL    03 Feb 2021 07:01  -  03 Feb 2021 17:50  --------------------------------------------------------  IN: 850 mL / OUT: 500 mL / NET: 350 mL        PHYSICAL EXAM:  Vital Signs Last 24 Hrs  T(C): 38.2 (03 Feb 2021 17:00), Max: 38.8 (03 Feb 2021 13:26)  T(F): 100.8 (03 Feb 2021 17:00), Max: 101.9 (03 Feb 2021 13:26)  HR: 111 (03 Feb 2021 17:00) (107 - 130)  BP: 153/79 (03 Feb 2021 17:00) (148/79 - 190/96)  BP(mean): --  RR: 22 (03 Feb 2021 17:00) (22 - 23)  SpO2: 92% (03 Feb 2021 17:00) (92% - 95%)    alert awake oriented to self   weak and elderly ill looking male, mucous membranes dry   lung diminished air entry bilateral  abdomen soft nontender      LABS:                        14.0   15.44 )-----------( 591      ( 03 Feb 2021 05:56 )             47.5     02-03    147<H>  |  107  |  46<H>  ----------------------------<  383<H>  3.9   |  20<L>  |  1.20    Ca    9.6      03 Feb 2021 05:56  Phos  2.9     02-02  Mg     2.4     02-02    TPro  7.3  /  Alb  3.1<L>  /  TBili  0.7  /  DBili  x   /  AST  14  /  ALT  14  /  AlkPhos  61  02-02      RADIOLOGY & ADDITIONAL TESTS:  Results Reviewed: y  Imaging Personally Reviewed:y  Electrocardiogram Personally Reviewed:y    COORDINATION OF CARE:  Care Discussed with Consultants/Other Providers [Y/N]:y  Prior or Outpatient Records Reviewed [Y/N]:y

## 2021-02-03 NOTE — PROGRESS NOTE ADULT - SUBJECTIVE AND OBJECTIVE BOX
Chief Complaint: T2DM    History: Patient noted to have FS in 300s this AM with increased AG of 20.     MEDICATIONS  (STANDING):  amLODIPine   Tablet 10 milliGRAM(s) Oral daily  aspirin  chewable 81 milliGRAM(s) Oral daily  clopidogrel Tablet 75 milliGRAM(s) Oral daily  dextrose 5% + lactated ringers. 1000 milliLiter(s) (84 mL/Hr) IV Continuous <Continuous>  dextrose 50% Injectable 25 Gram(s) IV Push once  dextrose 50% Injectable 12.5 Gram(s) IV Push once  dorzolamide 2% Ophthalmic Solution 1 Drop(s) Both EYES every 8 hours  doxycycline IVPB 100 milliGRAM(s) IV Intermittent every 12 hours  enoxaparin Injectable 40 milliGRAM(s) SubCutaneous daily  fenofibrate Tablet 145 milliGRAM(s) Oral daily  glucagon  Injectable 1 milliGRAM(s) IntraMuscular once  insulin glargine Injectable (LANTUS) 40 Unit(s) SubCutaneous at bedtime  insulin lispro (ADMELOG) corrective regimen sliding scale   SubCutaneous every 4 hours  insulin lispro Injectable (ADMELOG) 7 Unit(s) SubCutaneous three times a day before meals  insulin NPH human recombinant 8 Unit(s) SubCutaneous once  isosorbide   mononitrate ER Tablet (IMDUR) 60 milliGRAM(s) Oral daily  latanoprost 0.005% Ophthalmic Solution 1 Drop(s) Both EYES at bedtime  piperacillin/tazobactam IVPB.. 3.375 Gram(s) IV Intermittent every 8 hours  simvastatin 20 milliGRAM(s) Oral at bedtime  tamsulosin 0.4 milliGRAM(s) Oral at bedtime    MEDICATIONS  (PRN):  acetaminophen   Tablet .. 650 milliGRAM(s) Oral every 6 hours PRN Temp greater or equal to 38C (100.4F), Mild Pain (1 - 3), Moderate Pain (4 - 6), Severe Pain (7 - 10)  haloperidol    Injectable 5 milliGRAM(s) IV Push every 6 hours PRN Agitation  hydrALAZINE Injectable 10 milliGRAM(s) IV Push every 6 hours PRN SBP >180      Allergies    No Known Allergies    Intolerances      Review of Systems:  Constitutional: No fever  Eyes: No blurry vision  Neuro: No tremors  HEENT: No pain  Cardiovascular: No chest pain, palpitations  Respiratory: + SOB, no cough  GI: No nausea, vomiting, abdominal pain  : No dysuria  Skin: no rash  Psych: no depression  Endocrine: no polyuria, polydipsia  Hem/lymph: no swelling  Osteoporosis: no fractures    ALL OTHER SYSTEMS REVIEWED AND NEGATIVE        PHYSICAL EXAM:  VITALS: T(C): 38.1 (02-03-21 @ 07:51)  T(F): 100.6 (02-03-21 @ 07:51), Max: 100.6 (02-02-21 @ 13:38)  HR: 120 (02-03-21 @ 07:51) (107 - 120)  BP: 154/76 (02-03-21 @ 07:51) (148/79 - 200/98)  RR:  (20 - 22)  SpO2:  (92% - 95%)  Wt(kg): --  GENERAL: NAD, well-groomed, well-developed  EYES: No proptosis, no lid lag, anicteric  HEENT:  Atraumatic, Normocephalic, moist mucous membranes  RESPIRATORY: Decreased BS  CARDIOVASCULAR: Regular rate and rhythm  PSYCH: Alert and oriented x 2    POCT Blood Glucose.: 342 mg/dL (02-03-21 @ 05:50)  POCT Blood Glucose.: 382 mg/dL (02-03-21 @ 04:21)  POCT Blood Glucose.: 339 mg/dL (02-02-21 @ 21:21)  POCT Blood Glucose.: 350 mg/dL (02-02-21 @ 19:00)  POCT Blood Glucose.: 334 mg/dL (02-02-21 @ 16:58)  POCT Blood Glucose.: 294 mg/dL (02-02-21 @ 12:15)  POCT Blood Glucose.: 250 mg/dL (02-02-21 @ 07:50)  POCT Blood Glucose.: 209 mg/dL (02-01-21 @ 23:19)  POCT Blood Glucose.: 216 mg/dL (02-01-21 @ 21:29)  POCT Blood Glucose.: 224 mg/dL (02-01-21 @ 17:57)  POCT Blood Glucose.: 198 mg/dL (02-01-21 @ 12:26)  POCT Blood Glucose.: 139 mg/dL (02-01-21 @ 05:50)  POCT Blood Glucose.: 120 mg/dL (02-01-21 @ 03:11)  POCT Blood Glucose.: 101 mg/dL (02-01-21 @ 00:11)  POCT Blood Glucose.: 99 mg/dL (01-31-21 @ 21:19)      02-03    147<H>  |  107  |  46<H>  ----------------------------<  383<H>  3.9   |  20<L>  |  1.20    EGFR if : 70  EGFR if non : 60    Ca    9.6      02-03  Mg     2.4     02-02  Phos  2.9     02-02    TPro  7.3  /  Alb  3.1<L>  /  TBili  0.7  /  DBili  x   /  AST  14  /  ALT  14  /  AlkPhos  61  02-02

## 2021-02-03 NOTE — SWALLOW BEDSIDE ASSESSMENT ADULT - COMMENTS
Progress note 2/2/2021: 71y/o M with PMHx HTN, HLD, poorly controlled T2DM, CAD (+stents), with COVID ARDS c/b superimposed bacterial PNA, extubated 1/31. Seems he aspirated again, in addition to  atelectasis in the right lower lobe worsening.     CXR 2/2/2021: Improved left lung alveolar infiltrates Progress note 2/2/2021: 71y/o M with PMHx HTN, HLD, poorly controlled T2DM, CAD (+stents), with COVID ARDS c/b superimposed bacterial PNA, extubated 1/31. Seems he aspirated again, in addition to  atelectasis in the right lower lobe worsening.     CXR 2/2/2021: improved LEFT lung alveolar infiltrates with minimal residual in the LEFT lower lobe as well as small infiltrates in the peripheral RIGHT lung.    Clinical swallow evaluation completed 2/1/2021 during this hospitalization with recommendations of puree with honey-thick liquids. See note for details. Progress note 2/2/2021: 69y/o M with PMHx HTN, HLD, poorly controlled T2DM, CAD (+stents), with COVID ARDS c/b superimposed bacterial PNA, extubated 1/31. Seems he aspirated again, in addition to  atelectasis in the right lower lobe worsening.     CXR 2/2/2021: improved LEFT lung alveolar infiltrates with minimal residual in the LEFT lower lobe as well as small infiltrates in the peripheral RIGHT lung.    Clinical swallow evaluation completed 2/1/2021 during this hospitalization with recommendations of puree with honey-thick liquids. See note for details.    Per RN, patient with recent fever and team concerned for aspiration due to patient's recent CXR and fever. RN reporting night RN had difficulty administering medication crushed in applesauce to patient, patient with some bolus holding noted.     Consult received and chart reviewed. Clinical swallow evaluation attempted. Patient somnolent upon clinician's arrival, receiving oxygen at 4L/min via nasal cannula. Clinician attempted to wake patient with maximum verbal and tactile cues (including sternal rub). However, patient not responsive. Patient not appropriate for PO trials at this time. Reconsult when patient appropriate to determine oral candidacy.

## 2021-02-03 NOTE — PROVIDER CONTACT NOTE (OTHER) - DATE AND TIME:
03-Feb-2021 05:50
03-Feb-2021 08:35
03-Feb-2021 12:20
02-Feb-2021 13:40
23-Jan-2021 02:02
02-Feb-2021 16:50
03-Feb-2021 17:17

## 2021-02-03 NOTE — PROGRESS NOTE ADULT - PROBLEM SELECTOR PLAN 1
- A1c 9.6%  - Was on dysphagia diet but currently NPO   - Continue to keep NPO until glucose less than 250  - Give NPH 8 Units STAT now X 1  - Keep on moderate scale q 4 hours until glucose <250  - Repeat BMP at 3PM along with BHB to ensure that AG is improving  - Recommend increasing Lantus to 40 Units HS  - When patient is restarted on diet can add Admelog 7 Units TIDAC  - will follow-please page endocrine team if patient's condition worsens  - for discharge: plan to dc on insulin, recs TBD.

## 2021-02-03 NOTE — CONSULT NOTE ADULT - SUBJECTIVE AND OBJECTIVE BOX
"HPI:  70M with PMH CAD s/p stents x3 (2015), DM2, HTN, HLD, BPH who presents to the hospital with worsening SOB. Per pt - he started feeling unwell on Wednesday and decided to get tested for COVID, which came back positive. He denied any travels/sick contacts. He reports usually walking around his block and suspect that's how he caught it. He lives with his family, but no one else is sick. They have all tested negative so far. He has associated diarrhea that started around the same time. He also reports subjective fevers at home which resolved w/ tylenol. Because SOB has gotten worse along w/ MROTON - he decided to come to the hospital for evaluation.  Otherwise he denied sore throat, CP, palps, abd pain, N/V, dysuria. He does have a productive cough w/ grayish sputum.     ED Course:  151/79, 71,24-33, 98.7F, 95% (10L NRB)  Received Dex 6, APAP, LR 1L (22 Jan 2021 11:58)"    Above reviewed. Patient with history CAD, DM2, initially presenting with SOB. Patient was tested for COVID and found to be positive. Patient positive for COVID on 1/22. S/p course of dexamethasone and remdesivir. Patient has had intermittent fevers through stay, but escalating fevers over past 24 hours. He is ill appearing at bedside, fatigued. States no chest pain, no shortness of breath (but is on O2). No abd pain, no diarrhea, no N/V. No dysuria. No other new complaints to suggest alternate focus. ID called for further eval.    PAST MEDICAL & SURGICAL HISTORY:  CKD stage 3 secondary to diabetes    GERD (gastroesophageal reflux disease)    HLD (hyperlipidemia)    BPH (benign prostatic hyperplasia)    Type 2 diabetes mellitus    CAD (coronary artery disease)  stent x3 2015    HTN (hypertension)    No significant past surgical history    Allergies    No Known Allergies    ANTIMICROBIALS:  doxycycline IVPB 100 every 12 hours  piperacillin/tazobactam IVPB.. 3.375 every 8 hours    OTHER MEDS:  acetaminophen   Tablet .. 650 milliGRAM(s) Oral every 6 hours PRN  amLODIPine   Tablet 10 milliGRAM(s) Oral daily  aspirin  chewable 81 milliGRAM(s) Oral daily  clopidogrel Tablet 75 milliGRAM(s) Oral daily  dextrose 50% Injectable 25 Gram(s) IV Push once  dextrose 50% Injectable 12.5 Gram(s) IV Push once  dorzolamide 2% Ophthalmic Solution 1 Drop(s) Both EYES every 8 hours  enoxaparin Injectable 40 milliGRAM(s) SubCutaneous daily  fenofibrate Tablet 145 milliGRAM(s) Oral daily  glucagon  Injectable 1 milliGRAM(s) IntraMuscular once  haloperidol    Injectable 5 milliGRAM(s) IV Push every 6 hours PRN  hydrALAZINE Injectable 10 milliGRAM(s) IV Push every 6 hours PRN  insulin glargine Injectable (LANTUS) 40 Unit(s) SubCutaneous at bedtime  insulin lispro (ADMELOG) corrective regimen sliding scale   SubCutaneous every 4 hours  insulin lispro Injectable (ADMELOG) 7 Unit(s) SubCutaneous three times a day before meals  isosorbide   mononitrate ER Tablet (IMDUR) 60 milliGRAM(s) Oral daily  lactated ringers. 1000 milliLiter(s) IV Continuous <Continuous>  latanoprost 0.005% Ophthalmic Solution 1 Drop(s) Both EYES at bedtime  simvastatin 20 milliGRAM(s) Oral at bedtime  tamsulosin 0.4 milliGRAM(s) Oral at bedtime    SOCIAL HISTORY: No tobacco, no alcohol, no illicit drugs    FAMILY HISTORY:  No pertinent family history in first degree relatives relating to cheif complaint    Drug Dosing Weight  Height (cm): 175.3 (22 Jan 2021 13:40)  Weight (kg): 82.6 (22 Jan 2021 17:49)  BMI (kg/m2): 26.9 (22 Jan 2021 17:49)  BSA (m2): 1.99 (22 Jan 2021 17:49)    PE:    Vital Signs Last 24 Hrs  T(C): 38.8 (03 Feb 2021 13:26), Max: 38.8 (03 Feb 2021 13:26)  T(F): 101.9 (03 Feb 2021 13:26), Max: 101.9 (03 Feb 2021 13:26)  HR: 130 (03 Feb 2021 13:26) (107 - 130)  BP: 159/94 (03 Feb 2021 13:26) (148/79 - 194/94)  RR: 23 (03 Feb 2021 13:26) (20 - 23)  SpO2: 93% (03 Feb 2021 13:26) (93% - 95%)    Gen: AOx2-3, fatigued, ill appearing  CV: S1+S2 normal, nontachycardic  Resp: On NC O2, but not overtly SOB  Abd: Soft, nontender, +BS  Ext: No LE edema, no wounds  : No Oswald, no suprapubic tenderness  IV/Skin: No thrombophlebitis, no rash  Msk: No low back pain, no arthralgias, no joint swelling  Neuro: No sensory deficits, no motor deficits    LABS:                        14.0   15.44 )-----------( 591      ( 03 Feb 2021 05:56 )             47.5     02-03    147<H>  |  107  |  46<H>  ----------------------------<  383<H>  3.9   |  20<L>  |  1.20    Ca    9.6      03 Feb 2021 05:56  Phos  2.9     02-02  Mg     2.4     02-02    TPro  7.3  /  Alb  3.1<L>  /  TBili  0.7  /  DBili  x   /  AST  14  /  ALT  14  /  AlkPhos  61  02-02    MICROBIOLOGY:    .Sputum Sputum trap  01-27-21   Numerous Staphylococcus aureus  Normal Respiratory Susan absent  --  Staphylococcus aureus    .Blood Blood  01-27-21   No Growth Final     .Blood Blood-Peripheral  01-22-21   No Growth Final      .Sputum Sputum  01-22-21   Moderate Staphylococcus aureus  Moderate Haemophilus influenzae "Susceptibilities not performed"  Normal Respiratory Susan present  --  Staphylococcus aureus    RADIOLOGY:    CXR 2/2:     Impression: improved LEFT lung alveolar infiltrates with minimal residual in the LEFT lower lobe as well as small infiltrates in the peripheral RIGHT lung.    
  HPI:  Patient is a 70 year old man with PMH CAD s/p stents x3 (2015), uncontrolled DM2, HTN, HLD, BPH who presents to the hospital with worsening SOB, admitted with COVID infection, now intubated. A1c 9.6%. Has CKD stage 3, unknown if he has retinopathy or neuropathy. Has CAD as well. Patient seen at bedside, intubated and sedated, unable to obtain history from him. Per chart, he is on Novolog 15 units before meals, Basaglar 68 units qhs as well as Invokamet and Januvia (3 oral agents). At the bedside, he was on tube feeds at 10 cc/hr, rate was increased to 35 cc/hr when I was there. He is on NPH and BG remains in the 200's. Per his nurse who I spoke with, he will be proned later but the plan is to keep the feeds going while he is proned.     He received dexamethasone this morning and is ordered for more dexamethasone but order has not been verified by pharmacy yet.     PAST MEDICAL & SURGICAL HISTORY:  CKD stage 3 secondary to diabetes    GERD (gastroesophageal reflux disease)    HLD (hyperlipidemia)    BPH (benign prostatic hyperplasia)    Type 2 diabetes mellitus    CAD (coronary artery disease)  stent x3 2015    HTN (hypertension)    No significant past surgical history      FAMILY HISTORY:  No pertinent family history in first degree relatives    Social History:  no cigarette use  no alcohol use  lives with children     Outpatient Medications:  · 	NovoLOG FlexPen 100 units/mL injectable solution: Last Dose Taken:  , 15 unit(s) injectable 3 times a day (before meals)  · 	Basaglar KwikPen 100 units/mL subcutaneous solution: Last Dose Taken:  , 68 unit(s) subcutaneous once a day (at bedtime)  · 	omeprazole 40 mg oral delayed release capsule: Last Dose Taken:  , 1 cap(s) orally once a day  · 	Plavix 75 mg oral tablet: Last Dose Taken:  , 1 tab(s) orally once a day  · 	amLODIPine 2.5 mg oral tablet: Last Dose Taken:  , 1 tab(s) orally once a day  · 	simvastatin 40 mg oral tablet: Last Dose Taken:  , 1 tab(s) orally once a day (at bedtime)  · 	gabapentin 600 mg oral tablet: Last Dose Taken:  , 1 tab(s) orally 2 times a day  · 	Januvia 100 mg oral tablet: Last Dose Taken:  , 1 tab(s) orally once a day  · 	Flomax 0.4 mg oral capsule: Last Dose Taken:  , 1 cap(s) orally once a day  · 	fenofibrate 145 mg oral tablet: Last Dose Taken:  , 1 tab(s) orally once a day  · 	Invokamet XR 50 mg-1000 mg oral tablet, extended release: Last Dose Taken:  , 1 tab(s) orally once a day (in the morning)  · 	Lasix 20 mg oral tablet: Last Dose Taken:  , 1 tab(s) orally once a day  · 	metoprolol succinate 100 mg oral tablet, extended release: Last Dose Taken:  , 1 tab(s) orally once a day    MEDICATIONS  (STANDING):  ampicillin/sulbactam  IVPB 3 Gram(s) IV Intermittent every 6 hours  aspirin  chewable 81 milliGRAM(s) Oral daily  chlorhexidine 0.12% Liquid 15 milliLiter(s) Oral Mucosa every 12 hours  chlorhexidine 4% Liquid 1 Application(s) Topical <User Schedule>  cisatracurium Infusion 3 MICROgram(s)/kG/Min (14.9 mL/Hr) IV Continuous <Continuous>  clopidogrel Tablet 75 milliGRAM(s) Oral daily  dexAMETHasone  Injectable 6 milliGRAM(s) IV Push daily  enoxaparin Injectable 40 milliGRAM(s) SubCutaneous daily  fenofibrate Tablet 145 milliGRAM(s) Oral daily  fentaNYL   Infusion..... 1.501 MICROgram(s)/kG/Hr (2.48 mL/Hr) IV Continuous <Continuous>  glucagon  Injectable 1 milliGRAM(s) IntraMuscular once  glucagon  Injectable 1 milliGRAM(s) IntraMuscular once  insulin lispro (ADMELOG) corrective regimen sliding scale   SubCutaneous every 6 hours  insulin NPH human recombinant 40 Unit(s) SubCutaneous every 6 hours  midazolam Infusion 0.02 mG/kG/Hr (1.65 mL/Hr) IV Continuous <Continuous>  norepinephrine Infusion 0.05 MICROgram(s)/kG/Min (3.87 mL/Hr) IV Continuous <Continuous>  petrolatum Ophthalmic Ointment 1 Application(s) Both EYES daily  polyethylene glycol 3350 17 Gram(s) Oral two times a day  propofol Infusion 31.275 MICROgram(s)/kG/Min (15.5 mL/Hr) IV Continuous <Continuous>  senna Syrup 10 milliLiter(s) Oral two times a day  simvastatin 20 milliGRAM(s) Oral at bedtime  tamsulosin 0.4 milliGRAM(s) Oral at bedtime    MEDICATIONS  (PRN):  sodium chloride 0.9% lock flush 10 milliLiter(s) IV Push every 1 hour PRN Pre/post blood products, medications, blood draw, and to maintain line patency      Allergies  No Known Allergies    Review of Systems:  UNABLE TO OBTAIN    PHYSICAL EXAM:  VITALS: T(C): 37.7 (01-27-21 @ 12:00)  T(F): 99.9 (01-27-21 @ 12:00), Max: 100.8 (01-27-21 @ 04:00)  HR: 100 (01-27-21 @ 12:00) (62 - 100)  BP: 130/56 (01-26-21 @ 22:00) (97/49 - 130/56)  RR:  (27 - 28)  SpO2:  (92% - 99%)  Wt(kg): --  GENERAL: NAD, well-developed  EYES: No proptosis, anicteric  HEENT:  Atraumatic, Normocephalic  THYROID: go goiter noted, unable to fully examine as he is intubated   RESPIRATORY: no respiratory distress, + air movement bilaterally, no audible wheezing, on ventilator   CARDIOVASCULAR: Regular rate and rhythm; no peripheral edema; 2+ radial pulses  GI: Soft, nontender, non distended, normal bowel sounds  SKIN: Dry, intact, No ulcers on feet   MUSCULOSKELETAL: unable to assess as patient is intubated and sedated   NEURO: unable to assess as patient is intubated and sedated   PSYCH: unable to assess as patient is intubated and sedated     POCT Blood Glucose.: 280 mg/dL (01-27-21 @ 11:16) NPH 40  POCT Blood Glucose.: 237 mg/dL (01-27-21 @ 05:22) NPH 40, A 4  POCT Blood Glucose.: 282 mg/dL (01-27-21 @ 00:54)   POCT Blood Glucose.: 304 mg/dL (01-26-21 @ 22:38)NPH 40, A 8  POCT Blood Glucose.: 330 mg/dL (01-26-21 @ 17:39) NPH 40, A 8  POCT Blood Glucose.: 126 mg/dL (01-26-21 @ 11:29) NPH 40   POCT Blood Glucose.: 122 mg/dL (01-26-21 @ 10:07)  POCT Blood Glucose.: 119 mg/dL (01-26-21 @ 07:51)  POCT Blood Glucose.: 120 mg/dL (01-26-21 @ 06:05)  POCT Blood Glucose.: 135 mg/dL (01-26-21 @ 04:03)  POCT Blood Glucose.: 135 mg/dL (01-26-21 @ 02:16)  POCT Blood Glucose.: 142 mg/dL (01-26-21 @ 00:15)  POCT Blood Glucose.: 163 mg/dL (01-25-21 @ 22:13)  POCT Blood Glucose.: 191 mg/dL (01-25-21 @ 21:07)  POCT Blood Glucose.: 179 mg/dL (01-25-21 @ 20:17)  POCT Blood Glucose.: 220 mg/dL (01-25-21 @ 18:51)  POCT Blood Glucose.: 199 mg/dL (01-25-21 @ 17:56)  POCT Blood Glucose.: 209 mg/dL (01-25-21 @ 17:02)  POCT Blood Glucose.: 238 mg/dL (01-25-21 @ 16:03)  POCT Blood Glucose.: 190 mg/dL (01-25-21 @ 14:51)  POCT Blood Glucose.: 244 mg/dL (01-25-21 @ 13:59)  POCT Blood Glucose.: 259 mg/dL (01-25-21 @ 12:50)  POCT Blood Glucose.: 254 mg/dL (01-25-21 @ 11:59)  POCT Blood Glucose.: 213 mg/dL (01-25-21 @ 10:58)  POCT Blood Glucose.: 190 mg/dL (01-25-21 @ 09:54)  POCT Blood Glucose.: 177 mg/dL (01-25-21 @ 08:57)  POCT Blood Glucose.: 171 mg/dL (01-25-21 @ 07:50)  POCT Blood Glucose.: 166 mg/dL (01-25-21 @ 06:53)  POCT Blood Glucose.: 165 mg/dL (01-25-21 @ 06:05)  POCT Blood Glucose.: 151 mg/dL (01-25-21 @ 05:01)  POCT Blood Glucose.: 161 mg/dL (01-25-21 @ 04:04)  POCT Blood Glucose.: 163 mg/dL (01-25-21 @ 03:31)  POCT Blood Glucose.: 165 mg/dL (01-25-21 @ 01:56)  POCT Blood Glucose.: 179 mg/dL (01-25-21 @ 01:19)  POCT Blood Glucose.: 173 mg/dL (01-25-21 @ 00:31)  POCT Blood Glucose.: 183 mg/dL (01-24-21 @ 23:12)  POCT Blood Glucose.: 198 mg/dL (01-24-21 @ 22:16)  POCT Blood Glucose.: 211 mg/dL (01-24-21 @ 21:18)  POCT Blood Glucose.: 218 mg/dL (01-24-21 @ 20:06)  POCT Blood Glucose.: 230 mg/dL (01-24-21 @ 18:46)  POCT Blood Glucose.: 286 mg/dL (01-24-21 @ 17:45)  POCT Blood Glucose.: 325 mg/dL (01-24-21 @ 16:37)  POCT Blood Glucose.: 353 mg/dL (01-24-21 @ 15:26)                          10.8   15.09 )-----------( 370      ( 27 Jan 2021 03:58 )             35.5       01-27    141  |  105  |  53<H>  ----------------------------<  280<H>  4.7   |  22  |  1.36<H>    EGFR if : 60  EGFR if non : 52<L>    Ca    8.4      01-27  Mg     2.8     01-27  Phos  4.7     01-27    TPro  6.0  /  Alb  2.3<L>  /  TBili  0.4  /  DBili  x   /  AST  12  /  ALT  11  /  AlkPhos  55  01-27      A1c 9.6%

## 2021-02-03 NOTE — CONSULT NOTE ADULT - REASON FOR ADMISSION
Acute hypoxic respiratory failure 2/2 COVID-19 PNA
Acute hypoxic respiratory failure 2' COVID-19 PNA

## 2021-02-03 NOTE — PROGRESS NOTE ADULT - ATTENDING COMMENTS
Discussed with team  Marianela Gross (pager [phone])  On evenings and weekends, please call [phone] or page endocrine fellow on call.   Please note that this patient may be followed by different provider tomorrow. If no answer, contact endocrine fellow on call.

## 2021-02-03 NOTE — PROGRESS NOTE ADULT - ASSESSMENT
71y/o M with PMHx HTN, HLD, poorly controlled T2DM, CAD (+stents), with COVID ARDS c/b superimposed bacterial PNA, extubated 1/31.       acute respiratory failure with hypoxia  due to covid-19 pneumonia and super imposed bacterial staph aureus, H. influenzae pneumonia   CAD s/p stents in ?2015- continue ASA, plavix, statin  IDDDm2   generalized weakness   oropharyngeal dysphagia on dysphagia 1     febrile 101F, tachycardia  wbc 15k, still on nasal canula 4 L/min   aspirated likley in  addition to  atelectasis in the right lower lobe worsening       i called and updated his family on his condition and treatment plan. worsening fever despite abx and not responding to treatment, still aspirating possibly due to fatigue, he does not have history of dysphagia prior.   discussed with them code status and goals of care. he is full code     plan:   npo   IV fluids, monitor blood sugar, insulin  per endocriniology   continue IV zosyn and doxycyline   norvasc 10mg daily   incentive spirometry   IV unaysn switched to zosyn   wean O2 as tolerated   OOB , continue PT, he is going to need STR due to decline in functional status

## 2021-02-03 NOTE — CONSULT NOTE ADULT - ASSESSMENT
70 M with PMH CAD s/p stents x3 (2015), DM2, HTN, HLD, BPH who presents to the hospital with worsening SOB  Leukocytosis, fever  CXR with opacities, improving  Patient on 5L NC  S/p Remd/Dexa for COVID PNA  Sputum with MSSA  Had course of Unasyn/CTX for H influenzae and MSSA in sputum, stopped 1/31  Now worsening resp status, fevers, leukocytosis--concern for possible MSSA PNA or bacteremia?  Overall,  1) MSSA PNA/HAP  - Worsening resp status, signs sepsis; patient ill appearing  - Zosyn 3.375g q 8  - Would repeat sputum culture  - O2 supplementation per primary team  2) Fever/Leukocytosis  - Concern for possibility underlying occult bacteremia  - Vanco 1g x1 (ordered)  - Check BCXs x 2  - Monitor for possible alternate source  3) COVID  - S/p Remd/Dexa  - Supportive care    Low threshold for MICU re-consult    Bhupendra Yun MD  Pager [phone]  After 5pm and on weekends call [phone]

## 2021-02-04 LAB
ANION GAP SERPL CALC-SCNC: 11 MMOL/L — SIGNIFICANT CHANGE UP (ref 7–14)
ANION GAP SERPL CALC-SCNC: 12 MMOL/L — SIGNIFICANT CHANGE UP (ref 7–14)
APPEARANCE UR: ABNORMAL
BILIRUB UR-MCNC: NEGATIVE — SIGNIFICANT CHANGE UP
BUN SERPL-MCNC: 42 MG/DL — HIGH (ref 7–23)
BUN SERPL-MCNC: 50 MG/DL — HIGH (ref 7–23)
CALCIUM SERPL-MCNC: 8.8 MG/DL — SIGNIFICANT CHANGE UP (ref 8.4–10.5)
CALCIUM SERPL-MCNC: 9.3 MG/DL — SIGNIFICANT CHANGE UP (ref 8.4–10.5)
CHLORIDE SERPL-SCNC: 112 MMOL/L — HIGH (ref 98–107)
CHLORIDE SERPL-SCNC: 117 MMOL/L — HIGH (ref 98–107)
CO2 SERPL-SCNC: 23 MMOL/L — SIGNIFICANT CHANGE UP (ref 22–31)
CO2 SERPL-SCNC: 24 MMOL/L — SIGNIFICANT CHANGE UP (ref 22–31)
COLOR SPEC: YELLOW — SIGNIFICANT CHANGE UP
CREAT SERPL-MCNC: 1.2 MG/DL — SIGNIFICANT CHANGE UP (ref 0.5–1.3)
CREAT SERPL-MCNC: 1.24 MG/DL — SIGNIFICANT CHANGE UP (ref 0.5–1.3)
CRP SERPL-MCNC: 77.8 MG/L — HIGH
D DIMER BLD IA.RAPID-MCNC: 886 NG/ML DDU — HIGH
DIFF PNL FLD: NEGATIVE — SIGNIFICANT CHANGE UP
FERRITIN SERPL-MCNC: 670 NG/ML — HIGH (ref 30–400)
GLUCOSE BLDC GLUCOMTR-MCNC: 114 MG/DL — HIGH (ref 70–99)
GLUCOSE BLDC GLUCOMTR-MCNC: 136 MG/DL — HIGH (ref 70–99)
GLUCOSE BLDC GLUCOMTR-MCNC: 182 MG/DL — HIGH (ref 70–99)
GLUCOSE BLDC GLUCOMTR-MCNC: 280 MG/DL — HIGH (ref 70–99)
GLUCOSE BLDC GLUCOMTR-MCNC: 308 MG/DL — HIGH (ref 70–99)
GLUCOSE BLDC GLUCOMTR-MCNC: 325 MG/DL — HIGH (ref 70–99)
GLUCOSE SERPL-MCNC: 198 MG/DL — HIGH (ref 70–99)
GLUCOSE SERPL-MCNC: 331 MG/DL — HIGH (ref 70–99)
GLUCOSE UR QL: ABNORMAL
HCT VFR BLD CALC: 45.4 % — SIGNIFICANT CHANGE UP (ref 39–50)
HGB BLD-MCNC: 13.3 G/DL — SIGNIFICANT CHANGE UP (ref 13–17)
KETONES UR-MCNC: NEGATIVE — SIGNIFICANT CHANGE UP
LEUKOCYTE ESTERASE UR-ACNC: NEGATIVE — SIGNIFICANT CHANGE UP
MAGNESIUM SERPL-MCNC: 2.7 MG/DL — HIGH (ref 1.6–2.6)
MCHC RBC-ENTMCNC: 25.7 PG — LOW (ref 27–34)
MCHC RBC-ENTMCNC: 29.3 GM/DL — LOW (ref 32–36)
MCV RBC AUTO: 87.6 FL — SIGNIFICANT CHANGE UP (ref 80–100)
NITRITE UR-MCNC: NEGATIVE — SIGNIFICANT CHANGE UP
NRBC # BLD: 0 /100 WBCS — SIGNIFICANT CHANGE UP
NRBC # FLD: 0 K/UL — SIGNIFICANT CHANGE UP
PH UR: 6 — SIGNIFICANT CHANGE UP (ref 5–8)
PHOSPHATE SERPL-MCNC: 2.8 MG/DL — SIGNIFICANT CHANGE UP (ref 2.5–4.5)
PLATELET # BLD AUTO: 527 K/UL — HIGH (ref 150–400)
POTASSIUM SERPL-MCNC: 3.6 MMOL/L — SIGNIFICANT CHANGE UP (ref 3.5–5.3)
POTASSIUM SERPL-MCNC: 3.6 MMOL/L — SIGNIFICANT CHANGE UP (ref 3.5–5.3)
POTASSIUM SERPL-SCNC: 3.6 MMOL/L — SIGNIFICANT CHANGE UP (ref 3.5–5.3)
POTASSIUM SERPL-SCNC: 3.6 MMOL/L — SIGNIFICANT CHANGE UP (ref 3.5–5.3)
PROT UR-MCNC: ABNORMAL
RBC # BLD: 5.18 M/UL — SIGNIFICANT CHANGE UP (ref 4.2–5.8)
RBC # FLD: 14.1 % — SIGNIFICANT CHANGE UP (ref 10.3–14.5)
SODIUM SERPL-SCNC: 146 MMOL/L — HIGH (ref 135–145)
SODIUM SERPL-SCNC: 153 MMOL/L — HIGH (ref 135–145)
SP GR SPEC: 1.04 — HIGH (ref 1.01–1.02)
UROBILINOGEN FLD QL: SIGNIFICANT CHANGE UP
WBC # BLD: 24.32 K/UL — HIGH (ref 3.8–10.5)
WBC # FLD AUTO: 24.32 K/UL — HIGH (ref 3.8–10.5)

## 2021-02-04 PROCEDURE — 99232 SBSQ HOSP IP/OBS MODERATE 35: CPT

## 2021-02-04 PROCEDURE — 99233 SBSQ HOSP IP/OBS HIGH 50: CPT

## 2021-02-04 RX ORDER — SODIUM CHLORIDE 9 MG/ML
1000 INJECTION, SOLUTION INTRAVENOUS
Refills: 0 | Status: DISCONTINUED | OUTPATIENT
Start: 2021-02-04 | End: 2021-02-04

## 2021-02-04 RX ORDER — ACETAMINOPHEN 500 MG
650 TABLET ORAL ONCE
Refills: 0 | Status: COMPLETED | OUTPATIENT
Start: 2021-02-04 | End: 2021-02-04

## 2021-02-04 RX ORDER — INSULIN LISPRO 100/ML
VIAL (ML) SUBCUTANEOUS EVERY 6 HOURS
Refills: 0 | Status: DISCONTINUED | OUTPATIENT
Start: 2021-02-04 | End: 2021-02-05

## 2021-02-04 RX ADMIN — LATANOPROST 1 DROP(S): 0.05 SOLUTION/ DROPS OPHTHALMIC; TOPICAL at 21:59

## 2021-02-04 RX ADMIN — Medication 8: at 02:14

## 2021-02-04 RX ADMIN — PIPERACILLIN AND TAZOBACTAM 25 GRAM(S): 4; .5 INJECTION, POWDER, LYOPHILIZED, FOR SOLUTION INTRAVENOUS at 06:12

## 2021-02-04 RX ADMIN — ENOXAPARIN SODIUM 40 MILLIGRAM(S): 100 INJECTION SUBCUTANEOUS at 13:32

## 2021-02-04 RX ADMIN — TAMSULOSIN HYDROCHLORIDE 0.4 MILLIGRAM(S): 0.4 CAPSULE ORAL at 21:59

## 2021-02-04 RX ADMIN — Medication 260 MILLIGRAM(S): at 06:12

## 2021-02-04 RX ADMIN — PIPERACILLIN AND TAZOBACTAM 25 GRAM(S): 4; .5 INJECTION, POWDER, LYOPHILIZED, FOR SOLUTION INTRAVENOUS at 22:07

## 2021-02-04 RX ADMIN — SIMVASTATIN 20 MILLIGRAM(S): 20 TABLET, FILM COATED ORAL at 21:59

## 2021-02-04 RX ADMIN — Medication 10 MILLIGRAM(S): at 02:14

## 2021-02-04 RX ADMIN — DORZOLAMIDE HYDROCHLORIDE 1 DROP(S): 20 SOLUTION/ DROPS OPHTHALMIC at 21:59

## 2021-02-04 RX ADMIN — DORZOLAMIDE HYDROCHLORIDE 1 DROP(S): 20 SOLUTION/ DROPS OPHTHALMIC at 13:33

## 2021-02-04 RX ADMIN — SODIUM CHLORIDE 125 MILLILITER(S): 9 INJECTION, SOLUTION INTRAVENOUS at 12:11

## 2021-02-04 RX ADMIN — SODIUM CHLORIDE 125 MILLILITER(S): 9 INJECTION, SOLUTION INTRAVENOUS at 22:07

## 2021-02-04 RX ADMIN — Medication 2: at 06:13

## 2021-02-04 RX ADMIN — DORZOLAMIDE HYDROCHLORIDE 1 DROP(S): 20 SOLUTION/ DROPS OPHTHALMIC at 06:13

## 2021-02-04 RX ADMIN — Medication 145 MILLIGRAM(S): at 13:32

## 2021-02-04 RX ADMIN — Medication 81 MILLIGRAM(S): at 13:32

## 2021-02-04 RX ADMIN — PIPERACILLIN AND TAZOBACTAM 25 GRAM(S): 4; .5 INJECTION, POWDER, LYOPHILIZED, FOR SOLUTION INTRAVENOUS at 13:32

## 2021-02-04 RX ADMIN — Medication 650 MILLIGRAM(S): at 23:55

## 2021-02-04 RX ADMIN — CLOPIDOGREL BISULFATE 75 MILLIGRAM(S): 75 TABLET, FILM COATED ORAL at 13:32

## 2021-02-04 RX ADMIN — ISOSORBIDE MONONITRATE 60 MILLIGRAM(S): 60 TABLET, EXTENDED RELEASE ORAL at 13:00

## 2021-02-04 RX ADMIN — Medication 6: at 21:57

## 2021-02-04 RX ADMIN — INSULIN GLARGINE 40 UNIT(S): 100 INJECTION, SOLUTION SUBCUTANEOUS at 21:57

## 2021-02-04 NOTE — PROGRESS NOTE ADULT - SUBJECTIVE AND OBJECTIVE BOX
PROGRESS NOTE:     Patient is a 70y old  Male who presents with a chief complaint of Acute hypoxic respiratory failure 2' COVID-19 PNA (2021 10:25)    SUBJECTIVE / OVERNIGHT EVENTS:  reports fatigue, slightly better     ADDITIONAL REVIEW OF SYSTEMS:  denies pain  MEDICATIONS  (STANDING):  amLODIPine   Tablet 10 milliGRAM(s) Oral daily  aspirin  chewable 81 milliGRAM(s) Oral daily  clopidogrel Tablet 75 milliGRAM(s) Oral daily  dextrose 5%. 1000 milliLiter(s) (125 mL/Hr) IV Continuous <Continuous>  dextrose 50% Injectable 25 Gram(s) IV Push once  dextrose 50% Injectable 12.5 Gram(s) IV Push once  dorzolamide 2% Ophthalmic Solution 1 Drop(s) Both EYES every 8 hours  doxycycline hyclate Capsule 100 milliGRAM(s) Oral every 12 hours  enoxaparin Injectable 40 milliGRAM(s) SubCutaneous daily  fenofibrate Tablet 145 milliGRAM(s) Oral daily  glucagon  Injectable 1 milliGRAM(s) IntraMuscular once  insulin glargine Injectable (LANTUS) 40 Unit(s) SubCutaneous at bedtime  insulin lispro (ADMELOG) corrective regimen sliding scale   SubCutaneous every 6 hours  isosorbide   mononitrate ER Tablet (IMDUR) 60 milliGRAM(s) Oral daily  latanoprost 0.005% Ophthalmic Solution 1 Drop(s) Both EYES at bedtime  piperacillin/tazobactam IVPB.. 3.375 Gram(s) IV Intermittent every 8 hours  simvastatin 20 milliGRAM(s) Oral at bedtime  tamsulosin 0.4 milliGRAM(s) Oral at bedtime    MEDICATIONS  (PRN):  acetaminophen   Tablet .. 650 milliGRAM(s) Oral every 6 hours PRN Temp greater or equal to 38C (100.4F), Mild Pain (1 - 3), Moderate Pain (4 - 6), Severe Pain (7 - 10)  hydrALAZINE Injectable 10 milliGRAM(s) IV Push every 6 hours PRN SBP >180      CAPILLARY BLOOD GLUCOSE      POCT Blood Glucose.: 280 mg/dL (2021 21:23)  POCT Blood Glucose.: 136 mg/dL (2021 14:50)  POCT Blood Glucose.: 114 mg/dL (2021 08:57)  POCT Blood Glucose.: 182 mg/dL (2021 05:51)  POCT Blood Glucose.: 308 mg/dL (2021 01:59)  POCT Blood Glucose.: 299 mg/dL (2021 22:14)    I&O's Summary    2021 07:01  -  2021 07:00  --------------------------------------------------------  IN: 850 mL / OUT: 800 mL / NET: 50 mL    2021 07:01  -  2021 21:59  --------------------------------------------------------  IN: 1100 mL / OUT: 600 mL / NET: 500 mL        PHYSICAL EXAM:  Vital Signs Last 24 Hrs  T(C): 37.3 (2021 18:00), Max: 38.3 (2021 06:10)  T(F): 99.2 (2021 18:00), Max: 101 (2021 06:10)  HR: 98 (2021 12:48) (98 - 112)  BP: 160/72 (2021 12:48) (146/66 - 162/93)  BP(mean): --  RR: 17 (2021 18:00) (17 - 22)  SpO2: 95% (2021 18:00) (95% - 99%)    CONSTITUTIONAL: awake alert oriented to self   appears more comfortable , no longer tachypneic or tachycardic   lungs diminished bilateral breath sounds         LABS:                        13.3   24.32 )-----------( 527      ( 2021 07:04 )             45.4     02-04    153<H>  |  117<H>  |  50<H>  ----------------------------<  198<H>  3.6   |  24  |  1.24    Ca    9.3      2021 07:04  Phos  2.8     02-04  Mg     2.7     02-04    Urinalysis Basic - ( 2021 07:09 )    Color: Yellow / Appearance: Slightly Turbid / S.039 / pH: x  Gluc: x / Ketone: Negative  / Bili: Negative / Urobili: <2 mg/dL   Blood: x / Protein: 30 mg/dL / Nitrite: Negative   Leuk Esterase: Negative / RBC: <5 /HPF / WBC <5 /HPF   Sq Epi: x / Non Sq Epi: 0-5 /HPF / Bacteria: Negative    Culture - Blood (collected 2021 16:29)  Source: .Blood Blood-Venous  Preliminary Report (2021 17:01):    No growth to date.    Culture - Blood (collected 2021 16:29)  Source: .Blood Blood-Peripheral  Preliminary Report (2021 17:01):    No growth to date.      RADIOLOGY & ADDITIONAL TESTS:  Results Reviewed: y  Imaging Personally Reviewed:y  Electrocardiogram Personally Reviewed:y    COORDINATION OF CARE:  Care Discussed with Consultants/Other Providers [Y/N]:y  Prior or Outpatient Records Reviewed [Y/N]:y

## 2021-02-04 NOTE — PROGRESS NOTE ADULT - ASSESSMENT
70 M with PMH CAD s/p stents x3 (2015), DM2, HTN, HLD, BPH who presents to the hospital with worsening SOB  Leukocytosis, fever  CXR with opacities, improving  Patient on 5L NC  S/p Remd/Dexa for COVID PNA  Sputum with MSSA  Had course of Unasyn/CTX for H influenzae and MSSA in sputum, stopped 1/31  Now worsening resp status, fevers, leukocytosis--concern for possible MSSA PNA or bacteremia?  Overall,  1) MSSA PNA/HAP  - Worsening resp status, signs sepsis; patient ill appearing  - Zosyn 3.375g q 8  - Would repeat sputum culture  - O2 supplementation per primary team  - Check CT Chest, CT A/P to evaluate for occult source  2) Fever/Leukocytosis  - Concern for possibility underlying occult bacteremia  - Hold on further vanco for now  - Check BCXs x 2  - Monitor for possible alternate source  - Check MRSA PCR  3) COVID  - S/p Remd/Dexa  - Supportive care    Low threshold for MICU re-consult    Bhupendra Yun MD  Pager [phone]  After 5pm and on weekends call [phone]

## 2021-02-04 NOTE — PROGRESS NOTE ADULT - SUBJECTIVE AND OBJECTIVE BOX
Chief Complaint:     History:    MEDICATIONS  (STANDING):  amLODIPine   Tablet 10 milliGRAM(s) Oral daily  aspirin  chewable 81 milliGRAM(s) Oral daily  clopidogrel Tablet 75 milliGRAM(s) Oral daily  dextrose 50% Injectable 25 Gram(s) IV Push once  dextrose 50% Injectable 12.5 Gram(s) IV Push once  dorzolamide 2% Ophthalmic Solution 1 Drop(s) Both EYES every 8 hours  enoxaparin Injectable 40 milliGRAM(s) SubCutaneous daily  fenofibrate Tablet 145 milliGRAM(s) Oral daily  glucagon  Injectable 1 milliGRAM(s) IntraMuscular once  insulin glargine Injectable (LANTUS) 40 Unit(s) SubCutaneous at bedtime  insulin lispro (ADMELOG) corrective regimen sliding scale   SubCutaneous every 6 hours  isosorbide   mononitrate ER Tablet (IMDUR) 60 milliGRAM(s) Oral daily  latanoprost 0.005% Ophthalmic Solution 1 Drop(s) Both EYES at bedtime  piperacillin/tazobactam IVPB.. 3.375 Gram(s) IV Intermittent every 8 hours  simvastatin 20 milliGRAM(s) Oral at bedtime  sodium chloride 0.45%. 1000 milliLiter(s) (125 mL/Hr) IV Continuous <Continuous>  tamsulosin 0.4 milliGRAM(s) Oral at bedtime    MEDICATIONS  (PRN):  acetaminophen   Tablet .. 650 milliGRAM(s) Oral every 6 hours PRN Temp greater or equal to 38C (100.4F), Mild Pain (1 - 3), Moderate Pain (4 - 6), Severe Pain (7 - 10)  haloperidol    Injectable 5 milliGRAM(s) IV Push every 6 hours PRN Agitation  hydrALAZINE Injectable 10 milliGRAM(s) IV Push every 6 hours PRN SBP >180      Allergies    No Known Allergies    Intolerances      Review of Systems:  Constitutional: No fever  Eyes: No blurry vision  Neuro: No tremors  HEENT: No pain  Cardiovascular: No chest pain, palpitations  Respiratory: No SOB, no cough  GI: No nausea, vomiting, abdominal pain  : No dysuria  Skin: no rash  Psych: no depression  Endocrine: no polyuria, polydipsia  Hem/lymph: no swelling  Osteoporosis: no fractures    ALL OTHER SYSTEMS REVIEWED AND NEGATIVE    UNABLE TO OBTAIN    PHYSICAL EXAM:  VITALS: T(C): 37.8 (02-04-21 @ 07:46)  T(F): 100 (02-04-21 @ 07:46), Max: 101.9 (02-03-21 @ 13:26)  HR: 112 (02-04-21 @ 07:46) (98 - 130)  BP: 146/66 (02-04-21 @ 06:10) (146/66 - 162/93)  RR:  (22 - 23)  SpO2:  (92% - 97%)  Wt(kg): --  GENERAL: NAD, well-groomed, well-developed  EYES: No proptosis, no lid lag, anicteric  HEENT:  Atraumatic, Normocephalic, moist mucous membranes  THYROID: Normal size, no palpable nodules  RESPIRATORY: Clear to auscultation bilaterally; No rales, rhonchi, wheezing, or rubs  CARDIOVASCULAR: Regular rate and rhythm; No murmurs; no peripheral edema  GI: Soft, nontender, non distended, normal bowel sounds  SKIN: Dry, intact, No rashes or lesions  MUSCULOSKELETAL: Full range of motion, normal strength  NEURO: sensation intact, extraocular movements intact, no tremor, normal reflexes  PSYCH: Alert and oriented x 3, normal affect, normal mood  CUSHING'S SIGNS: no striae    POCT Blood Glucose.: 114 mg/dL (02-04-21 @ 08:57)  POCT Blood Glucose.: 182 mg/dL (02-04-21 @ 05:51)  POCT Blood Glucose.: 308 mg/dL (02-04-21 @ 01:59)  POCT Blood Glucose.: 299 mg/dL (02-03-21 @ 22:14)  POCT Blood Glucose.: 390 mg/dL (02-03-21 @ 17:36)  POCT Blood Glucose.: 340 mg/dL (02-03-21 @ 12:36)  POCT Blood Glucose.: 342 mg/dL (02-03-21 @ 05:50)  POCT Blood Glucose.: 382 mg/dL (02-03-21 @ 04:21)  POCT Blood Glucose.: 339 mg/dL (02-02-21 @ 21:21)  POCT Blood Glucose.: 350 mg/dL (02-02-21 @ 19:00)  POCT Blood Glucose.: 334 mg/dL (02-02-21 @ 16:58)  POCT Blood Glucose.: 294 mg/dL (02-02-21 @ 12:15)  POCT Blood Glucose.: 250 mg/dL (02-02-21 @ 07:50)  POCT Blood Glucose.: 209 mg/dL (02-01-21 @ 23:19)  POCT Blood Glucose.: 216 mg/dL (02-01-21 @ 21:29)  POCT Blood Glucose.: 224 mg/dL (02-01-21 @ 17:57)  POCT Blood Glucose.: 198 mg/dL (02-01-21 @ 12:26)      02-04    153<H>  |  117<H>  |  50<H>  ----------------------------<  198<H>  3.6   |  24  |  1.24    EGFR if : 67  EGFR if non : 58<L>    Ca    9.3      02-04  Mg     2.7     02-04  Phos  2.8     02-04    TPro  7.3  /  Alb  3.1<L>  /  TBili  0.7  /  DBili  x   /  AST  14  /  ALT  14  /  AlkPhos  61  02-02          Thyroid Function Tests:                           Chief Complaint: T2DM    History: Patient remains NPO due to lethargy and concern for aspiration. FS are improved today.    MEDICATIONS  (STANDING):  amLODIPine   Tablet 10 milliGRAM(s) Oral daily  aspirin  chewable 81 milliGRAM(s) Oral daily  clopidogrel Tablet 75 milliGRAM(s) Oral daily  dextrose 50% Injectable 25 Gram(s) IV Push once  dextrose 50% Injectable 12.5 Gram(s) IV Push once  dorzolamide 2% Ophthalmic Solution 1 Drop(s) Both EYES every 8 hours  enoxaparin Injectable 40 milliGRAM(s) SubCutaneous daily  fenofibrate Tablet 145 milliGRAM(s) Oral daily  glucagon  Injectable 1 milliGRAM(s) IntraMuscular once  insulin glargine Injectable (LANTUS) 40 Unit(s) SubCutaneous at bedtime  insulin lispro (ADMELOG) corrective regimen sliding scale   SubCutaneous every 6 hours  isosorbide   mononitrate ER Tablet (IMDUR) 60 milliGRAM(s) Oral daily  latanoprost 0.005% Ophthalmic Solution 1 Drop(s) Both EYES at bedtime  piperacillin/tazobactam IVPB.. 3.375 Gram(s) IV Intermittent every 8 hours  simvastatin 20 milliGRAM(s) Oral at bedtime  sodium chloride 0.45%. 1000 milliLiter(s) (125 mL/Hr) IV Continuous <Continuous>  tamsulosin 0.4 milliGRAM(s) Oral at bedtime    MEDICATIONS  (PRN):  acetaminophen   Tablet .. 650 milliGRAM(s) Oral every 6 hours PRN Temp greater or equal to 38C (100.4F), Mild Pain (1 - 3), Moderate Pain (4 - 6), Severe Pain (7 - 10)  haloperidol    Injectable 5 milliGRAM(s) IV Push every 6 hours PRN Agitation  hydrALAZINE Injectable 10 milliGRAM(s) IV Push every 6 hours PRN SBP >180      Allergies    No Known Allergies    Intolerances      Review of Systems:    UNABLE TO OBTAIN    PHYSICAL EXAM:  VITALS: T(C): 37.8 (02-04-21 @ 07:46)  T(F): 100 (02-04-21 @ 07:46), Max: 101.9 (02-03-21 @ 13:26)  HR: 112 (02-04-21 @ 07:46) (98 - 130)  BP: 146/66 (02-04-21 @ 06:10) (146/66 - 162/93)  RR:  (22 - 23)  SpO2:  (92% - 97%)  Wt(kg): --  GENERAL: NAD  HEENT:  Atraumatic, Normocephalic, moist mucous membranes  RESPIRATORY: Decreased BS  CARDIOVASCULAR: Regular rate and rhythm  GI: Soft, nontender      POCT Blood Glucose.: 114 mg/dL (02-04-21 @ 08:57)  POCT Blood Glucose.: 182 mg/dL (02-04-21 @ 05:51)  POCT Blood Glucose.: 308 mg/dL (02-04-21 @ 01:59)  POCT Blood Glucose.: 299 mg/dL (02-03-21 @ 22:14)  POCT Blood Glucose.: 390 mg/dL (02-03-21 @ 17:36)  POCT Blood Glucose.: 340 mg/dL (02-03-21 @ 12:36)  POCT Blood Glucose.: 342 mg/dL (02-03-21 @ 05:50)  POCT Blood Glucose.: 382 mg/dL (02-03-21 @ 04:21)  POCT Blood Glucose.: 339 mg/dL (02-02-21 @ 21:21)  POCT Blood Glucose.: 350 mg/dL (02-02-21 @ 19:00)  POCT Blood Glucose.: 334 mg/dL (02-02-21 @ 16:58)  POCT Blood Glucose.: 294 mg/dL (02-02-21 @ 12:15)  POCT Blood Glucose.: 250 mg/dL (02-02-21 @ 07:50)  POCT Blood Glucose.: 209 mg/dL (02-01-21 @ 23:19)  POCT Blood Glucose.: 216 mg/dL (02-01-21 @ 21:29)  POCT Blood Glucose.: 224 mg/dL (02-01-21 @ 17:57)  POCT Blood Glucose.: 198 mg/dL (02-01-21 @ 12:26)      02-04    153<H>  |  117<H>  |  50<H>  ----------------------------<  198<H>  3.6   |  24  |  1.24    EGFR if : 67  EGFR if non : 58<L>    Ca    9.3      02-04  Mg     2.7     02-04  Phos  2.8     02-04    TPro  7.3  /  Alb  3.1<L>  /  TBili  0.7  /  DBili  x   /  AST  14  /  ALT  14  /  AlkPhos  61  02-02

## 2021-02-04 NOTE — CHART NOTE - NSCHARTNOTEFT_GEN_A_CORE
Reviewed BMP   02-04-21 @ 22:57    146<H>  |  112<H>  |  42<H>             --------------------------< 331<H>     3.6  |  23  | 1.20  Sodium noted to be downtrended to 146 from 153. Patient is currently on IV fluid D5 W at 125 ml/hour/. IV fluid discontinued. Primary RN made aware.

## 2021-02-04 NOTE — PROGRESS NOTE ADULT - ASSESSMENT
71y/o M with PMHx HTN, HLD, poorly controlled T2DM, CAD (+stents), with COVID ARDS c/b superimposed bacterial PNA, extubated 1/31.     acute respiratory failure with hypoxia  due to covid-19 pneumonia and super imposed bacterial staph aureus MSSA, H. influenzae pneumonia   CAD s/p stents in ?2015- continue ASA, plavix, statin  IDDDm2   generalized weakness   oropharyngeal dysphagia on dysphagia 1     febrile 101F, tachycardia  wbc 24k, still on nasal canula 4 L/min   aspirated likley in  addition to  atelectasis in the right lower lobe worsening, aspiration pneumonia  HCAP, he is at risk for complex pneumonia and ventilator associated gram positive and negative organism and anaerobic organism       febrile, seems to be improving a little today with hydration, was able to drink some thickened water today with spoon  needs assist with feeding       plan:   continue dextrose 5% IV at 125ml/hr   monitor sodium level , increase to 150ml/hr if not improving , goal to decrease sodium by 6-8 meq in 24hrs,  if not improving tomorrow morning with insert NGT for enteral hydration   npo for now except for medications, swallow evaluation tomorrow again if he is more able to take po , he seems to be improving   hold of on CT scan today, reassess tomorrow after hydration to minimize kidney injury   IV fluids, monitor blood sugar, insulin  per endocriniology   continue IV zosyn  and  po doxycyline    incentive spirometry   wean O2 as tolerated , , maintain saturation 92-94%  OOB , continue PT, he is going to need STR due to decline in functional status

## 2021-02-04 NOTE — PROGRESS NOTE ADULT - SUBJECTIVE AND OBJECTIVE BOX
CC: F/U for PNA    Saw/spoke to patient. Still ill appearing but somewhat improved.    Allergies  No Known Allergies    ANTIMICROBIALS:  piperacillin/tazobactam IVPB.. 3.375 every 8 hours    PE:    Vital Signs Last 24 Hrs  T(C): 36.9 (2021 10:56), Max: 38.8 (2021 13:26)  T(F): 98.5 (2021 10:56), Max: 101.9 (2021 13:26)  HR: 98 (2021 12:48) (98 - 130)  BP: 154/76 (2021 10:56) (146/66 - 162/93)  RR: 17 (2021 10:56) (17 - 23)  SpO2: 99% (2021 10:56) (92% - 99%)    Gen: AOx2-3, fatigued, generally ill appearing  CV: S1+S2 normal, nontachycardic  Resp: Clear bilat, no resp distress, no crackles/wheezes, NC  Abd: Soft, nontender, +BS  Ext: No LE edema, no wounds    LABS:                        13.3   24.32 )-----------( 527      ( 2021 07:04 )             45.4     02-04    153<H>  |  117<H>  |  50<H>  ----------------------------<  198<H>  3.6   |  24  |  1.24    Ca    9.3      2021 07:04  Phos  2.8     02-04  Mg     2.7     02-04    Urinalysis Basic - ( 2021 07:09 )    Color: Yellow / Appearance: Slightly Turbid / S.039 / pH: x  Gluc: x / Ketone: Negative  / Bili: Negative / Urobili: <2 mg/dL   Blood: x / Protein: 30 mg/dL / Nitrite: Negative   Leuk Esterase: Negative / RBC: <5 /HPF / WBC <5 /HPF   Sq Epi: x / Non Sq Epi: 0-5 /HPF / Bacteria: Negative    MICROBIOLOGY:    .Sputum Sputum trap  21   Numerous Staphylococcus aureus  Normal Respiratory Susan absent  --  Staphylococcus aureus    .Blood Blood  21   No Growth Final    .Blood Blood-Peripheral  21   No Growth Final     .Sputum Sputum  21   Moderate Staphylococcus aureus  Moderate Haemophilus influenzae "Susceptibilities not performed"  Normal Respiratory Susan present  --  Staphylococcus aureus    RADIOLOGY:     XR:    Impression:improved LEFT lung alveolar infiltrates with minimal residual in the LEFT lower lobe as well as small infiltrates in the peripheral RIGHT lung.

## 2021-02-04 NOTE — PROGRESS NOTE ADULT - ATTENDING COMMENTS
Marianela Gross (pager [phone])  On evenings and weekends, please call [phone] or page endocrine fellow on call.   Please note that this patient may be followed by different provider tomorrow. If no answer, contact endocrine fellow on call.

## 2021-02-04 NOTE — PROGRESS NOTE ADULT - PROBLEM SELECTOR PLAN 1
- A1c 9.6%  - Was on dysphagia diet but currently NPO   - Continue to keep NPO until glucose less than 250  - Give NPH 8 Units STAT now X 1  - Keep on moderate scale q 4 hours until glucose <250  - Repeat BMP at 3PM along with BHB to ensure that AG is improving  - Recommend increasing Lantus to 40 Units HS  - When patient is restarted on diet can add Admelog 7 Units TIDAC  - will follow-please page endocrine team if patient's condition worsens  - for discharge: plan to dc on insulin, recs TBD. - A1c 9.6%  - Was on dysphagia diet but currently NPO   - Recommend change fluids to D5 1/2 NS while patient NPO  - Keep on moderate scale q 6 hours   - Recommend c/w Lantus 40 Units HS  - Will discontinue premeal insulin for now  - will follow  - for discharge: plan to dc on insulin, recs TBD.

## 2021-02-05 LAB
ANION GAP SERPL CALC-SCNC: 9 MMOL/L — SIGNIFICANT CHANGE UP (ref 7–14)
BUN SERPL-MCNC: 37 MG/DL — HIGH (ref 7–23)
CALCIUM SERPL-MCNC: 8.8 MG/DL — SIGNIFICANT CHANGE UP (ref 8.4–10.5)
CHLORIDE SERPL-SCNC: 113 MMOL/L — HIGH (ref 98–107)
CO2 SERPL-SCNC: 25 MMOL/L — SIGNIFICANT CHANGE UP (ref 22–31)
CREAT SERPL-MCNC: 1.07 MG/DL — SIGNIFICANT CHANGE UP (ref 0.5–1.3)
GLUCOSE BLDC GLUCOMTR-MCNC: 258 MG/DL — HIGH (ref 70–99)
GLUCOSE BLDC GLUCOMTR-MCNC: 262 MG/DL — HIGH (ref 70–99)
GLUCOSE BLDC GLUCOMTR-MCNC: 270 MG/DL — HIGH (ref 70–99)
GLUCOSE BLDC GLUCOMTR-MCNC: 283 MG/DL — HIGH (ref 70–99)
GLUCOSE SERPL-MCNC: 286 MG/DL — HIGH (ref 70–99)
POTASSIUM SERPL-MCNC: 3.6 MMOL/L — SIGNIFICANT CHANGE UP (ref 3.5–5.3)
POTASSIUM SERPL-SCNC: 3.6 MMOL/L — SIGNIFICANT CHANGE UP (ref 3.5–5.3)
SODIUM SERPL-SCNC: 147 MMOL/L — HIGH (ref 135–145)

## 2021-02-05 PROCEDURE — 99232 SBSQ HOSP IP/OBS MODERATE 35: CPT

## 2021-02-05 PROCEDURE — 99233 SBSQ HOSP IP/OBS HIGH 50: CPT

## 2021-02-05 RX ORDER — INSULIN LISPRO 100/ML
VIAL (ML) SUBCUTANEOUS
Refills: 0 | Status: DISCONTINUED | OUTPATIENT
Start: 2021-02-05 | End: 2021-02-06

## 2021-02-05 RX ORDER — SODIUM CHLORIDE 9 MG/ML
1000 INJECTION, SOLUTION INTRAVENOUS
Refills: 0 | Status: DISCONTINUED | OUTPATIENT
Start: 2021-02-05 | End: 2021-02-10

## 2021-02-05 RX ORDER — INSULIN LISPRO 100/ML
5 VIAL (ML) SUBCUTANEOUS ONCE
Refills: 0 | Status: DISCONTINUED | OUTPATIENT
Start: 2021-02-05 | End: 2021-02-06

## 2021-02-05 RX ORDER — SODIUM CHLORIDE 9 MG/ML
1000 INJECTION, SOLUTION INTRAVENOUS
Refills: 0 | Status: DISCONTINUED | OUTPATIENT
Start: 2021-02-05 | End: 2021-02-05

## 2021-02-05 RX ORDER — DEXTROSE 50 % IN WATER 50 %
15 SYRINGE (ML) INTRAVENOUS ONCE
Refills: 0 | Status: DISCONTINUED | OUTPATIENT
Start: 2021-02-05 | End: 2021-02-10

## 2021-02-05 RX ORDER — INSULIN LISPRO 100/ML
6 VIAL (ML) SUBCUTANEOUS
Refills: 0 | Status: DISCONTINUED | OUTPATIENT
Start: 2021-02-05 | End: 2021-02-06

## 2021-02-05 RX ORDER — INSULIN GLARGINE 100 [IU]/ML
30 INJECTION, SOLUTION SUBCUTANEOUS AT BEDTIME
Refills: 0 | Status: DISCONTINUED | OUTPATIENT
Start: 2021-02-05 | End: 2021-02-06

## 2021-02-05 RX ADMIN — Medication 3: at 17:54

## 2021-02-05 RX ADMIN — ISOSORBIDE MONONITRATE 60 MILLIGRAM(S): 60 TABLET, EXTENDED RELEASE ORAL at 11:46

## 2021-02-05 RX ADMIN — Medication 8: at 00:19

## 2021-02-05 RX ADMIN — SIMVASTATIN 20 MILLIGRAM(S): 20 TABLET, FILM COATED ORAL at 21:57

## 2021-02-05 RX ADMIN — AMLODIPINE BESYLATE 10 MILLIGRAM(S): 2.5 TABLET ORAL at 05:23

## 2021-02-05 RX ADMIN — DORZOLAMIDE HYDROCHLORIDE 1 DROP(S): 20 SOLUTION/ DROPS OPHTHALMIC at 14:44

## 2021-02-05 RX ADMIN — TAMSULOSIN HYDROCHLORIDE 0.4 MILLIGRAM(S): 0.4 CAPSULE ORAL at 21:57

## 2021-02-05 RX ADMIN — Medication 145 MILLIGRAM(S): at 11:45

## 2021-02-05 RX ADMIN — DORZOLAMIDE HYDROCHLORIDE 1 DROP(S): 20 SOLUTION/ DROPS OPHTHALMIC at 21:58

## 2021-02-05 RX ADMIN — Medication 81 MILLIGRAM(S): at 11:45

## 2021-02-05 RX ADMIN — Medication 6: at 13:32

## 2021-02-05 RX ADMIN — PIPERACILLIN AND TAZOBACTAM 25 GRAM(S): 4; .5 INJECTION, POWDER, LYOPHILIZED, FOR SOLUTION INTRAVENOUS at 14:43

## 2021-02-05 RX ADMIN — INSULIN GLARGINE 30 UNIT(S): 100 INJECTION, SOLUTION SUBCUTANEOUS at 21:57

## 2021-02-05 RX ADMIN — Medication 6: at 06:32

## 2021-02-05 RX ADMIN — Medication 100 MILLIGRAM(S): at 05:23

## 2021-02-05 RX ADMIN — PIPERACILLIN AND TAZOBACTAM 25 GRAM(S): 4; .5 INJECTION, POWDER, LYOPHILIZED, FOR SOLUTION INTRAVENOUS at 05:23

## 2021-02-05 RX ADMIN — PIPERACILLIN AND TAZOBACTAM 25 GRAM(S): 4; .5 INJECTION, POWDER, LYOPHILIZED, FOR SOLUTION INTRAVENOUS at 21:57

## 2021-02-05 RX ADMIN — ENOXAPARIN SODIUM 40 MILLIGRAM(S): 100 INJECTION SUBCUTANEOUS at 11:45

## 2021-02-05 RX ADMIN — DORZOLAMIDE HYDROCHLORIDE 1 DROP(S): 20 SOLUTION/ DROPS OPHTHALMIC at 05:24

## 2021-02-05 RX ADMIN — LATANOPROST 1 DROP(S): 0.05 SOLUTION/ DROPS OPHTHALMIC; TOPICAL at 21:59

## 2021-02-05 RX ADMIN — Medication 100 MILLIGRAM(S): at 17:54

## 2021-02-05 RX ADMIN — CLOPIDOGREL BISULFATE 75 MILLIGRAM(S): 75 TABLET, FILM COATED ORAL at 11:45

## 2021-02-05 NOTE — PROGRESS NOTE ADULT - ATTENDING COMMENTS
Debbie Gaffney MD  Pager [phone] ([facility])/ [phone] ([place]) [please provide 10 digit call back number]  Nights and weekends: [phone]  Please note that this patient may be followed by a different provider tomorrow. If no answer or after hours, please contact [phone].  For final dc reccomendations, please call [phone] or page the endocrine fellow on call.      -I spent a total time of 40 mins with the patient at bedside/on unit of which more than 50% of time was spent on counseling/coordination of care.    High risk patient with high level decision making, at high risk of worsening microvascular and macrovascular complications.

## 2021-02-05 NOTE — PROGRESS NOTE ADULT - SUBJECTIVE AND OBJECTIVE BOX
Chief Complaint/Follow-up on: DM    Subjective:  POC blood glucose and insulin use reviewed.  pt has been receiveing dextrose till this afternoon for hypernatremia  he is currently not eating, pending speech and swallow eval as well    MEDICATIONS  (STANDING):  amLODIPine   Tablet 10 milliGRAM(s) Oral daily  aspirin  chewable 81 milliGRAM(s) Oral daily  clopidogrel Tablet 75 milliGRAM(s) Oral daily  dextrose 50% Injectable 25 Gram(s) IV Push once  dextrose 50% Injectable 12.5 Gram(s) IV Push once  dorzolamide 2% Ophthalmic Solution 1 Drop(s) Both EYES every 8 hours  doxycycline hyclate Capsule 100 milliGRAM(s) Oral every 12 hours  enoxaparin Injectable 40 milliGRAM(s) SubCutaneous daily  fenofibrate Tablet 145 milliGRAM(s) Oral daily  glucagon  Injectable 1 milliGRAM(s) IntraMuscular once  insulin glargine Injectable (LANTUS) 40 Unit(s) SubCutaneous at bedtime  insulin lispro (ADMELOG) corrective regimen sliding scale   SubCutaneous every 6 hours  isosorbide   mononitrate ER Tablet (IMDUR) 60 milliGRAM(s) Oral daily  latanoprost 0.005% Ophthalmic Solution 1 Drop(s) Both EYES at bedtime  piperacillin/tazobactam IVPB.. 3.375 Gram(s) IV Intermittent every 8 hours  simvastatin 20 milliGRAM(s) Oral at bedtime  tamsulosin 0.4 milliGRAM(s) Oral at bedtime    MEDICATIONS  (PRN):  acetaminophen   Tablet .. 650 milliGRAM(s) Oral every 6 hours PRN Temp greater or equal to 38C (100.4F), Mild Pain (1 - 3), Moderate Pain (4 - 6), Severe Pain (7 - 10)  hydrALAZINE Injectable 10 milliGRAM(s) IV Push every 6 hours PRN SBP >180      PHYSICAL EXAM:  VITALS: T(C): 36.8 (02-05-21 @ 11:48)  T(F): 98.3 (02-05-21 @ 11:48), Max: 100 (02-05-21 @ 00:01)  HR: 88 (02-05-21 @ 11:48) (88 - 99)  BP: 165/75 (02-05-21 @ 11:48) (143/77 - 165/75)  RR:  (17 - 19)  SpO2:  (95% - 98%)  Wt(kg): --  GENERAL: NAD, frail appearing male, resting   RESPIRATORY: Clear to auscultation bilaterally; No rales, rhonchi, wheezing, or rubs  CARDIOVASCULAR: Regular rate and rhythm; No murmurs; no peripheral edema  GI: Soft, nontender, non distended, normal bowel sounds      POCT Blood Glucose.: 270 mg/dL (02-05-21 @ 13:04)  POCT Blood Glucose.: 262 mg/dL (02-05-21 @ 06:24)  POCT Blood Glucose.: 325 mg/dL (02-04-21 @ 23:57)  POCT Blood Glucose.: 280 mg/dL (02-04-21 @ 21:23)  POCT Blood Glucose.: 136 mg/dL (02-04-21 @ 14:50)  POCT Blood Glucose.: 114 mg/dL (02-04-21 @ 08:57)  POCT Blood Glucose.: 182 mg/dL (02-04-21 @ 05:51)  POCT Blood Glucose.: 308 mg/dL (02-04-21 @ 01:59)  POCT Blood Glucose.: 299 mg/dL (02-03-21 @ 22:14)  POCT Blood Glucose.: 390 mg/dL (02-03-21 @ 17:36)  POCT Blood Glucose.: 340 mg/dL (02-03-21 @ 12:36)  POCT Blood Glucose.: 342 mg/dL (02-03-21 @ 05:50)  POCT Blood Glucose.: 382 mg/dL (02-03-21 @ 04:21)  POCT Blood Glucose.: 339 mg/dL (02-02-21 @ 21:21)  POCT Blood Glucose.: 350 mg/dL (02-02-21 @ 19:00)  POCT Blood Glucose.: 334 mg/dL (02-02-21 @ 16:58)    02-05    147<H>  |  113<H>  |  37<H>  ----------------------------<  286<H>  3.6   |  25  |  1.07    EGFR if : 80  EGFR if non : 69    Ca    8.8      02-05  Mg     2.7     02-04  Phos  2.8     02-04            Thyroid Function Tests:

## 2021-02-05 NOTE — PROGRESS NOTE ADULT - SUBJECTIVE AND OBJECTIVE BOX
PROGRESS NOTE:     Patient is a 70y old  Male who presents with a chief complaint of Acute hypoxic respiratory failure 2' COVID-19 PNA (2021 14:12)    SUBJECTIVE / OVERNIGHT EVENTS:  reports improving sob   ADDITIONAL REVIEW OF SYSTEMS:  denies fever     MEDICATIONS  (STANDING):  amLODIPine   Tablet 10 milliGRAM(s) Oral daily  aspirin  chewable 81 milliGRAM(s) Oral daily  clopidogrel Tablet 75 milliGRAM(s) Oral daily  dextrose 40% Gel 15 Gram(s) Oral once  dextrose 5%. 1000 milliLiter(s) (100 mL/Hr) IV Continuous <Continuous>  dextrose 5%. 1000 milliLiter(s) (50 mL/Hr) IV Continuous <Continuous>  dextrose 50% Injectable 25 Gram(s) IV Push once  dextrose 50% Injectable 12.5 Gram(s) IV Push once  dorzolamide 2% Ophthalmic Solution 1 Drop(s) Both EYES every 8 hours  doxycycline hyclate Capsule 100 milliGRAM(s) Oral every 12 hours  enoxaparin Injectable 40 milliGRAM(s) SubCutaneous daily  fenofibrate Tablet 145 milliGRAM(s) Oral daily  glucagon  Injectable 1 milliGRAM(s) IntraMuscular once  insulin glargine Injectable (LANTUS) 30 Unit(s) SubCutaneous at bedtime  insulin lispro (ADMELOG) corrective regimen sliding scale   SubCutaneous three times a day before meals  isosorbide   mononitrate ER Tablet (IMDUR) 60 milliGRAM(s) Oral daily  latanoprost 0.005% Ophthalmic Solution 1 Drop(s) Both EYES at bedtime  piperacillin/tazobactam IVPB.. 3.375 Gram(s) IV Intermittent every 8 hours  simvastatin 20 milliGRAM(s) Oral at bedtime  tamsulosin 0.4 milliGRAM(s) Oral at bedtime    MEDICATIONS  (PRN):  acetaminophen   Tablet .. 650 milliGRAM(s) Oral every 6 hours PRN Temp greater or equal to 38C (100.4F), Mild Pain (1 - 3), Moderate Pain (4 - 6), Severe Pain (7 - 10)  hydrALAZINE Injectable 10 milliGRAM(s) IV Push every 6 hours PRN SBP >180      CAPILLARY BLOOD GLUCOSE      POCT Blood Glucose.: 283 mg/dL (2021 17:31)  POCT Blood Glucose.: 270 mg/dL (2021 13:04)  POCT Blood Glucose.: 262 mg/dL (2021 06:24)  POCT Blood Glucose.: 325 mg/dL (2021 23:57)    I&O's Summary    2021 07:01  -  2021 07:00  --------------------------------------------------------  IN: 1600 mL / OUT: 1400 mL / NET: 200 mL        PHYSICAL EXAM:  Vital Signs Last 24 Hrs  T(C): 36.8 (2021 11:48), Max: 37.8 (2021 00:01)  T(F): 98.3 (2021 11:48), Max: 100 (2021 00:01)  HR: 88 (2021 11:48) (88 - 99)  BP: 165/75 (2021 11:48) (143/77 - 165/75)  BP(mean): --  RR: 18 (2021 11:48) (18 - 19)  SpO2: 97% (2021 11:48) (97% - 98%)    awake, alert,   more comfortable, mucous membranes hydrated   lugs diminished air entry , generalized weakness    LABS:                        13.3   24.32 )-----------( 527      ( 2021 07:04 )             45.4     02-05    147<H>  |  113<H>  |  37<H>  ----------------------------<  286<H>  3.6   |  25  |  1.07    Ca    8.8      2021 05:30  Phos  2.8     02-04  Mg     2.7     02-04            Urinalysis Basic - ( 2021 07:09 )    Color: Yellow / Appearance: Slightly Turbid / S.039 / pH: x  Gluc: x / Ketone: Negative  / Bili: Negative / Urobili: <2 mg/dL   Blood: x / Protein: 30 mg/dL / Nitrite: Negative   Leuk Esterase: Negative / RBC: <5 /HPF / WBC <5 /HPF   Sq Epi: x / Non Sq Epi: 0-5 /HPF / Bacteria: Negative        Culture - Blood (collected 2021 16:29)  Source: .Blood Blood-Venous  Preliminary Report (2021 17:01):    No growth to date.    Culture - Blood (collected 2021 16:29)  Source: .Blood Blood-Peripheral  Preliminary Report (2021 17:01):    No growth to date.        RADIOLOGY & ADDITIONAL TESTS:  Results Reviewed: y  Imaging Personally Reviewed:y  Electrocardiogram Personally Reviewed:y    COORDINATION OF CARE:  Care Discussed with Consultants/Other Providers [Y/N]:y  Prior or Outpatient Records Reviewed [Y/N]:y

## 2021-02-05 NOTE — PROGRESS NOTE ADULT - SUBJECTIVE AND OBJECTIVE BOX
CC: F/U for PNA    Saw/spoke to patient. No new complaints. Improved.    Allergies  No Known Allergies    ANTIMICROBIALS:  doxycycline hyclate Capsule 100 every 12 hours  piperacillin/tazobactam IVPB.. 3.375 every 8 hours    PE:    Vital Signs Last 24 Hrs  T(C): 36.8 (2021 11:48), Max: 37.8 (2021 00:01)  T(F): 98.3 (2021 11:48), Max: 100 (2021 00:01)  HR: 88 (2021 11:48) (88 - 99)  BP: 165/75 (2021 11:48) (143/77 - 165/75)  RR: 18 (2021 11:48) (17 - 19)  SpO2: 97% (2021 11:48) (95% - 98%)    Gen: AOx3, NAD, non-toxic  CV: S1+S2 normal, nontachycardic  Resp: Clear bilat, no resp distress, no crackles/wheezes  Abd: Soft, nontender, +BS  Ext: No LE edema, no wounds    LABS:                        13.3   24.32 )-----------( 527      ( 2021 07:04 )             45.4     02-05    147<H>  |  113<H>  |  37<H>  ----------------------------<  286<H>  3.6   |  25  |  1.07    Ca    8.8      2021 05:30  Phos  2.8     02-04  Mg     2.7     02-04    Urinalysis Basic - ( 2021 07:09 )    Color: Yellow / Appearance: Slightly Turbid / S.039 / pH: x  Gluc: x / Ketone: Negative  / Bili: Negative / Urobili: <2 mg/dL   Blood: x / Protein: 30 mg/dL / Nitrite: Negative   Leuk Esterase: Negative / RBC: <5 /HPF / WBC <5 /HPF   Sq Epi: x / Non Sq Epi: 0-5 /HPF / Bacteria: Negative    MICROBIOLOGY:    .Blood Blood-Peripheral  21   No growth to date.     .Sputum Sputum trap  21   Numerous Staphylococcus aureus  Normal Respiratory Susan absent  --  Staphylococcus aureus    .Blood Blood  21   No Growth Final    .Sputum Sputum  21   Moderate Staphylococcus aureus  Moderate Haemophilus influenzae "Susceptibilities not performed"  Normal Respiratory Susan present  --  Staphylococcus aureus    (otherwise reviewed)    RADIOLOGY:     XR:    AP radiograph of the chest demonstrates improved LEFT lung alveolar infiltrates with minimal residual in the LEFT lower lobe as well as small infiltrates in the peripheral RIGHT lung. The cardiac silhouette is normal in size. Osseous structures are intact.    Impression:improved LEFT lung alveolar infiltrates with minimal residual in the LEFT lower lobe as well as small infiltrates in the peripheral RIGHT lung.

## 2021-02-05 NOTE — PROGRESS NOTE ADULT - ASSESSMENT
70 year old man with PMH CAD s/p stents x3 (2015), uncontrolled DM2, HTN, HLD, BPH who presents to the hospital with worsening SOB, admitted with COVID infection,   Consult called for management of uncontrolled DM2 in critically ill patient initially intubated for COVID infection. Pt now extubated on O2 via NC. BG goal 100-180. High risk patient with high level decision making.   pt with current inpt course complicated by hypernatermia, requiring dextrose, with hyperglycemia as a result         1. Uncontrolled type 2 diabetes mellitus with hyperglycemia.    Plan: - A1c 9.6%  - Was on dysphagia diet but currently NPO   - pt with marked hyperglycemia while on dextrose fluids:;  would be cautious with dextrose fluids for hypernatremia, reccomend either 1/2NS for hypernatremia vs lower rates of dextrose given marked hyperglycemia  - pt with hyperglycemia in last 2 hrs due to dextrose, this is now off this afternoon. will need to monitor the pt trend bc unclear trajectory and is NPO now.    2/5/2021  if the pt is now NPO:   FSBG q4hrs  decrease to low dose ISS q4hrs for now from moderate dose scale as dextrose is now off  decrease to Lantus 30 Units HS  discontinue premeal insulin for now      if the pt is to be on a diet :  Would recommend 32 Lantus at night (pt has  recieved 20 in past with hyperglycemia while on diet and has had 40 Lantus at night as well (but this is with dextrose on board therefore will aim for lower lantus at 32)  check 3 am glucose to ensure no hypoglycemia   admelog 7-7-7 before meals   moderate  dose ISS premeals and bedtime     inform endocrine of hypoglycemia or persistent hyperglycemia episodes as changes in pts insulin regimen will need to be made.   notify endocrine if any plans to be NPO/diet changes as this will also affect insulin regimen.      dc: final dc reccs TBD based on inpt course     Hyperlipidemia, unspecified hyperlipidemia type.    Plan: - c/w statin and fibrate   - check lipids as outpatient.        Essential hypertension.  Plan: Mgmt per primary team.        70 year old man with PMH CAD s/p stents x3 (2015), uncontrolled DM2, HTN, HLD, BPH who presents to the hospital with worsening SOB, admitted with COVID infection,   Consult called for management of uncontrolled DM2 in critically ill patient initially intubated for COVID infection. Pt now extubated on O2 via NC. BG goal 100-180. High risk patient with high level decision making.   pt with current inpt course complicated by hypernatermia, requiring dextrose, with hyperglycemia as a result         1. Uncontrolled type 2 diabetes mellitus with hyperglycemia.    Plan: - A1c 9.6%  - Was on dysphagia diet but currently NPO   - pt with marked hyperglycemia while on dextrose fluids:;  would be cautious with dextrose fluids for hypernatremia, reccomend either 1/2NS for hypernatremia vs lower rates of dextrose given marked hyperglycemia  - pt with hyperglycemia in last 24 hrs due to dextrose, this is now off this afternoon. will need to monitor the pt trend bc unclear trajectory and is NPO now.    2/5/2021  if the pt is now NPO:   FSBG q4hrs  decrease to low dose ISS q4hrs for now from moderate dose scale as dextrose is now off  decrease to Lantus 30 Units HS  discontinue premeal insulin for now      if the pt is to be on a diet :  Would recommend 32 Lantus at night (pt has  recieved 20 in past with hyperglycemia while on diet and has had 40 Lantus at night as well (but this is with dextrose on board therefore will aim for lower lantus at 32)  check 3 am glucose to ensure no hypoglycemia   admelog 7-7-7 before meals   moderate  dose ISS premeals and bedtime     inform endocrine of hypoglycemia or persistent hyperglycemia episodes as changes in pts insulin regimen will need to be made.   notify endocrine if any plans to be NPO/diet changes as this will also affect insulin regimen.      dc: final dc reccs TBD based on inpt course     Hyperlipidemia, unspecified hyperlipidemia type.    Plan: - c/w statin and fibrate   - check lipids as outpatient.        Essential hypertension.  Plan: Mgmt per primary team.

## 2021-02-05 NOTE — PROGRESS NOTE ADULT - ASSESSMENT
71y/o M with PMHx HTN, HLD, poorly controlled T2DM, CAD (+stents), with COVID ARDS c/b superimposed bacterial PNA, was intubated on mechanical ventilator and  extubated 1/31,  feb 1 was febrile 101F, tachycardia 120's  rising and peaked wbc 24k,  on nasal canula 4 L/min, switched to zosyn and doxcycline with improvement and resolution.  aspirated likely in  addition to  atelectasis in the right lower lobe worsening, aspiration pneumonia  HCAP, he is at risk for complex pneumonia and ventilator associated gram positive and negative organism and anaerobic organism. failed IV unasyn and developed worsening  pneumonia , organism suspected pseudomonas related to ventilator or MRSA   he had acute metabolic encephalopathy due to hypernatremia sodiumn 153 and dehydration treated with Iv fluids dextrose water with improvement       acute respiratory failure with hypoxia  due to covid-19 pneumonia and super imposed bacterial sputum cultuere grew staph aureus MSSA, H. influenzae pneumonia , failed unasyn no new sputum culture able to be obtained   CAD s/p stents in ?2015- continue ASA, plavix, statin  IDDDm2   generalized weakness   oropharyngeal dysphagia on dysphagia 1       afebrile, seems to be improving a little today with hydration, was able to drink some thickened water today with spoon  needs assist with feeding     plan:   slp follow up , resume dysphagia diet , encourage oral hydration   continue IV zosyn  and  po doxycyline  incentive spirometry   wean O2 as tolerated , maintain saturation 92-94%  OOB , continue PT,  discharge planning monday or tuesday if remains stable  to STR due to decline in functional status

## 2021-02-05 NOTE — CHART NOTE - NSCHARTNOTEFT_GEN_A_CORE
Reviewed [facility].   02-04-21 @ 22:57    146<H>  |  112<H>  |  42<H>             --------------------------< 331<H>     3.6  |  23  | 1.20    Serum sodium noted to be 146. Serum glucose on BMP noted to be 330. Corrected sodium is 150.  Patient is currently on D5 W at 125 ml/hour. IV fluid decreased to 100 ml/hour. Patient is currently on finger stick Q6 hours with insulin correction and Lantus 40 units at bedtime. Will continue to monitor finger stick and serum sodium.  Will repeat BMP at 0400. Will continue to monitor.

## 2021-02-05 NOTE — PROGRESS NOTE ADULT - ASSESSMENT
70 M with PMH CAD s/p stents x3 (2015), DM2, BPH who presents to the hospital with worsening SOB  Leukocytosis, fever  CXR with opacities, improving  Patient on 5L NC  S/p Remd/Dexa for COVID PNA  Sputum with MSSA  Had course of Unasyn/CTX for H influenzae and MSSA in sputum, stopped 1/31  Appears improving today, improved resp status/mental status  Overall,  1) MSSA PNA/HAP  - Worsening resp status, signs sepsis; patient ill appearing  - Zosyn 3.375g q 8  - Would DC Doxycyline (pathogen is anticipated to be MSSA, covered by Zosyn)  - Would repeat sputum culture  - O2 supplementation per primary team  - If not improving, check CT Chest, CT A/P to evaluate for occult source  2) Fever/Leukocytosis  - F/U BCXs x 2  - Monitor for possible alternate source  - Trend to normal  3) COVID  - S/p Remd/Dexa  - Supportive care    My colleagues will be covering this patient starting on 2/6/21, I will return 2/9/21.  Please call [phone] or on call fellow with any questions or change in status.     Bhupendra Yun MD  Pager [phone]  After 5pm and on weekends call [phone]

## 2021-02-06 LAB
ALBUMIN SERPL ELPH-MCNC: 3 G/DL — LOW (ref 3.3–5)
ALP SERPL-CCNC: 64 U/L — SIGNIFICANT CHANGE UP (ref 40–120)
ALT FLD-CCNC: 33 U/L — SIGNIFICANT CHANGE UP (ref 4–41)
ANION GAP SERPL CALC-SCNC: 7 MMOL/L — SIGNIFICANT CHANGE UP (ref 7–14)
AST SERPL-CCNC: 58 U/L — HIGH (ref 4–40)
BASOPHILS # BLD AUTO: 0.05 K/UL — SIGNIFICANT CHANGE UP (ref 0–0.2)
BASOPHILS NFR BLD AUTO: 0.4 % — SIGNIFICANT CHANGE UP (ref 0–2)
BILIRUB SERPL-MCNC: 0.6 MG/DL — SIGNIFICANT CHANGE UP (ref 0.2–1.2)
BUN SERPL-MCNC: 26 MG/DL — HIGH (ref 7–23)
CALCIUM SERPL-MCNC: 9 MG/DL — SIGNIFICANT CHANGE UP (ref 8.4–10.5)
CHLORIDE SERPL-SCNC: 113 MMOL/L — HIGH (ref 98–107)
CO2 SERPL-SCNC: 27 MMOL/L — SIGNIFICANT CHANGE UP (ref 22–31)
CREAT SERPL-MCNC: 0.9 MG/DL — SIGNIFICANT CHANGE UP (ref 0.5–1.3)
EOSINOPHIL # BLD AUTO: 0.17 K/UL — SIGNIFICANT CHANGE UP (ref 0–0.5)
EOSINOPHIL NFR BLD AUTO: 1.2 % — SIGNIFICANT CHANGE UP (ref 0–6)
GLUCOSE BLDC GLUCOMTR-MCNC: 134 MG/DL — HIGH (ref 70–99)
GLUCOSE BLDC GLUCOMTR-MCNC: 185 MG/DL — HIGH (ref 70–99)
GLUCOSE BLDC GLUCOMTR-MCNC: 225 MG/DL — HIGH (ref 70–99)
GLUCOSE BLDC GLUCOMTR-MCNC: 243 MG/DL — HIGH (ref 70–99)
GLUCOSE BLDC GLUCOMTR-MCNC: 79 MG/DL — SIGNIFICANT CHANGE UP (ref 70–99)
GLUCOSE SERPL-MCNC: 264 MG/DL — HIGH (ref 70–99)
HCT VFR BLD CALC: 39 % — SIGNIFICANT CHANGE UP (ref 39–50)
HGB BLD-MCNC: 11.7 G/DL — LOW (ref 13–17)
IANC: 10.51 K/UL — HIGH (ref 1.5–8.5)
IMM GRANULOCYTES NFR BLD AUTO: 1.2 % — SIGNIFICANT CHANGE UP (ref 0–1.5)
LYMPHOCYTES # BLD AUTO: 16.1 % — SIGNIFICANT CHANGE UP (ref 13–44)
LYMPHOCYTES # BLD AUTO: 2.23 K/UL — SIGNIFICANT CHANGE UP (ref 1–3.3)
MAGNESIUM SERPL-MCNC: 2.5 MG/DL — SIGNIFICANT CHANGE UP (ref 1.6–2.6)
MCHC RBC-ENTMCNC: 25.8 PG — LOW (ref 27–34)
MCHC RBC-ENTMCNC: 30 GM/DL — LOW (ref 32–36)
MCV RBC AUTO: 85.9 FL — SIGNIFICANT CHANGE UP (ref 80–100)
MONOCYTES # BLD AUTO: 0.73 K/UL — SIGNIFICANT CHANGE UP (ref 0–0.9)
MONOCYTES NFR BLD AUTO: 5.3 % — SIGNIFICANT CHANGE UP (ref 2–14)
NEUTROPHILS # BLD AUTO: 10.51 K/UL — HIGH (ref 1.8–7.4)
NEUTROPHILS NFR BLD AUTO: 75.8 % — SIGNIFICANT CHANGE UP (ref 43–77)
NRBC # BLD: 0 /100 WBCS — SIGNIFICANT CHANGE UP
NRBC # FLD: 0 K/UL — SIGNIFICANT CHANGE UP
PHOSPHATE SERPL-MCNC: 2.7 MG/DL — SIGNIFICANT CHANGE UP (ref 2.5–4.5)
PLATELET # BLD AUTO: 348 K/UL — SIGNIFICANT CHANGE UP (ref 150–400)
POTASSIUM SERPL-MCNC: 3.5 MMOL/L — SIGNIFICANT CHANGE UP (ref 3.5–5.3)
POTASSIUM SERPL-SCNC: 3.5 MMOL/L — SIGNIFICANT CHANGE UP (ref 3.5–5.3)
PROT SERPL-MCNC: 6.5 G/DL — SIGNIFICANT CHANGE UP (ref 6–8.3)
RBC # BLD: 4.54 M/UL — SIGNIFICANT CHANGE UP (ref 4.2–5.8)
RBC # FLD: 13.7 % — SIGNIFICANT CHANGE UP (ref 10.3–14.5)
SODIUM SERPL-SCNC: 147 MMOL/L — HIGH (ref 135–145)
WBC # BLD: 13.86 K/UL — HIGH (ref 3.8–10.5)
WBC # FLD AUTO: 13.86 K/UL — HIGH (ref 3.8–10.5)

## 2021-02-06 PROCEDURE — 99232 SBSQ HOSP IP/OBS MODERATE 35: CPT

## 2021-02-06 RX ORDER — INSULIN LISPRO 100/ML
7 VIAL (ML) SUBCUTANEOUS
Refills: 0 | Status: DISCONTINUED | OUTPATIENT
Start: 2021-02-06 | End: 2021-02-07

## 2021-02-06 RX ORDER — INSULIN GLARGINE 100 [IU]/ML
32 INJECTION, SOLUTION SUBCUTANEOUS AT BEDTIME
Refills: 0 | Status: DISCONTINUED | OUTPATIENT
Start: 2021-02-06 | End: 2021-02-10

## 2021-02-06 RX ORDER — INSULIN LISPRO 100/ML
VIAL (ML) SUBCUTANEOUS
Refills: 0 | Status: DISCONTINUED | OUTPATIENT
Start: 2021-02-06 | End: 2021-02-10

## 2021-02-06 RX ORDER — SODIUM CHLORIDE 9 MG/ML
1000 INJECTION, SOLUTION INTRAVENOUS
Refills: 0 | Status: DISCONTINUED | OUTPATIENT
Start: 2021-02-06 | End: 2021-02-08

## 2021-02-06 RX ORDER — INSULIN LISPRO 100/ML
VIAL (ML) SUBCUTANEOUS AT BEDTIME
Refills: 0 | Status: DISCONTINUED | OUTPATIENT
Start: 2021-02-06 | End: 2021-02-10

## 2021-02-06 RX ORDER — INSULIN LISPRO 100/ML
7 VIAL (ML) SUBCUTANEOUS ONCE
Refills: 0 | Status: COMPLETED | OUTPATIENT
Start: 2021-02-06 | End: 2021-02-06

## 2021-02-06 RX ADMIN — ENOXAPARIN SODIUM 40 MILLIGRAM(S): 100 INJECTION SUBCUTANEOUS at 12:36

## 2021-02-06 RX ADMIN — PIPERACILLIN AND TAZOBACTAM 25 GRAM(S): 4; .5 INJECTION, POWDER, LYOPHILIZED, FOR SOLUTION INTRAVENOUS at 06:42

## 2021-02-06 RX ADMIN — Medication 81 MILLIGRAM(S): at 12:36

## 2021-02-06 RX ADMIN — TAMSULOSIN HYDROCHLORIDE 0.4 MILLIGRAM(S): 0.4 CAPSULE ORAL at 22:53

## 2021-02-06 RX ADMIN — DORZOLAMIDE HYDROCHLORIDE 1 DROP(S): 20 SOLUTION/ DROPS OPHTHALMIC at 14:02

## 2021-02-06 RX ADMIN — CLOPIDOGREL BISULFATE 75 MILLIGRAM(S): 75 TABLET, FILM COATED ORAL at 12:36

## 2021-02-06 RX ADMIN — Medication 100 MILLIGRAM(S): at 06:42

## 2021-02-06 RX ADMIN — Medication 145 MILLIGRAM(S): at 12:36

## 2021-02-06 RX ADMIN — DORZOLAMIDE HYDROCHLORIDE 1 DROP(S): 20 SOLUTION/ DROPS OPHTHALMIC at 22:54

## 2021-02-06 RX ADMIN — Medication 7 UNIT(S): at 13:03

## 2021-02-06 RX ADMIN — LATANOPROST 1 DROP(S): 0.05 SOLUTION/ DROPS OPHTHALMIC; TOPICAL at 22:54

## 2021-02-06 RX ADMIN — DORZOLAMIDE HYDROCHLORIDE 1 DROP(S): 20 SOLUTION/ DROPS OPHTHALMIC at 06:42

## 2021-02-06 RX ADMIN — INSULIN GLARGINE 32 UNIT(S): 100 INJECTION, SOLUTION SUBCUTANEOUS at 22:00

## 2021-02-06 RX ADMIN — SIMVASTATIN 20 MILLIGRAM(S): 20 TABLET, FILM COATED ORAL at 22:53

## 2021-02-06 RX ADMIN — PIPERACILLIN AND TAZOBACTAM 25 GRAM(S): 4; .5 INJECTION, POWDER, LYOPHILIZED, FOR SOLUTION INTRAVENOUS at 22:54

## 2021-02-06 RX ADMIN — SODIUM CHLORIDE 75 MILLILITER(S): 9 INJECTION, SOLUTION INTRAVENOUS at 10:20

## 2021-02-06 RX ADMIN — ISOSORBIDE MONONITRATE 60 MILLIGRAM(S): 60 TABLET, EXTENDED RELEASE ORAL at 12:36

## 2021-02-06 RX ADMIN — AMLODIPINE BESYLATE 10 MILLIGRAM(S): 2.5 TABLET ORAL at 06:42

## 2021-02-06 RX ADMIN — PIPERACILLIN AND TAZOBACTAM 25 GRAM(S): 4; .5 INJECTION, POWDER, LYOPHILIZED, FOR SOLUTION INTRAVENOUS at 14:01

## 2021-02-06 RX ADMIN — Medication 7 UNIT(S): at 10:20

## 2021-02-06 RX ADMIN — Medication 2: at 13:04

## 2021-02-06 NOTE — PROGRESS NOTE ADULT - SUBJECTIVE AND OBJECTIVE BOX
Rebeca Morenois   Pager [phone]     PROGRESS NOTE:   Patient is a 70y old  Male who presents with a chief complaint of Acute hypoxic respiratory failure 2' COVID-19 PNA (05 Feb 2021 14:12)    SUBJECTIVE / OVERNIGHT EVENTS:  patient w/ improving shortness of breath. patient feeling home sick. patient reports generalized fatigue and weakness. Patient otherwise w/o complaints. ROS otherwise negative.     MEDICATIONS  (STANDING):  amLODIPine   Tablet 10 milliGRAM(s) Oral daily  aspirin  chewable 81 milliGRAM(s) Oral daily  clopidogrel Tablet 75 milliGRAM(s) Oral daily  dextrose 40% Gel 15 Gram(s) Oral once  dextrose 5%. 1000 milliLiter(s) (50 mL/Hr) IV Continuous <Continuous>  dextrose 5%. 1000 milliLiter(s) (100 mL/Hr) IV Continuous <Continuous>  dextrose 50% Injectable 25 Gram(s) IV Push once  dextrose 50% Injectable 12.5 Gram(s) IV Push once  dorzolamide 2% Ophthalmic Solution 1 Drop(s) Both EYES every 8 hours  enoxaparin Injectable 40 milliGRAM(s) SubCutaneous daily  fenofibrate Tablet 145 milliGRAM(s) Oral daily  glucagon  Injectable 1 milliGRAM(s) IntraMuscular once  insulin glargine Injectable (LANTUS) 32 Unit(s) SubCutaneous at bedtime  insulin lispro (ADMELOG) corrective regimen sliding scale   SubCutaneous three times a day before meals  insulin lispro (ADMELOG) corrective regimen sliding scale   SubCutaneous at bedtime  insulin lispro Injectable (ADMELOG) 7 Unit(s) SubCutaneous three times a day before meals  isosorbide   mononitrate ER Tablet (IMDUR) 60 milliGRAM(s) Oral daily  latanoprost 0.005% Ophthalmic Solution 1 Drop(s) Both EYES at bedtime  piperacillin/tazobactam IVPB.. 3.375 Gram(s) IV Intermittent every 8 hours  simvastatin 20 milliGRAM(s) Oral at bedtime  sodium chloride 0.45%. 1000 milliLiter(s) (75 mL/Hr) IV Continuous <Continuous>  tamsulosin 0.4 milliGRAM(s) Oral at bedtime    MEDICATIONS  (PRN):  acetaminophen   Tablet .. 650 milliGRAM(s) Oral every 6 hours PRN Temp greater or equal to 38C (100.4F), Mild Pain (1 - 3), Moderate Pain (4 - 6), Severe Pain (7 - 10)  hydrALAZINE Injectable 10 milliGRAM(s) IV Push every 6 hours PRN SBP >180    CAPILLARY BLOOD GLUCOSE  POCT Blood Glucose.: 185 mg/dL (06 Feb 2021 13:01)  POCT Blood Glucose.: 225 mg/dL (06 Feb 2021 09:30)  POCT Blood Glucose.: 243 mg/dL (06 Feb 2021 03:06)  POCT Blood Glucose.: 258 mg/dL (05 Feb 2021 21:32)    I&O's Summary    05 Feb 2021 07:01  -  06 Feb 2021 07:00  --------------------------------------------------------  IN: 0 mL / OUT: 900 mL / NET: -900 mL    PHYSICAL EXAM:  Vital Signs Last 24 Hrs  T(C): 36.5 (06 Feb 2021 11:00), Max: 36.8 (05 Feb 2021 21:30)  T(F): 97.7 (06 Feb 2021 11:00), Max: 98.2 (05 Feb 2021 21:30)  HR: 91 (06 Feb 2021 12:32) (88 - 93)  BP: 163/81 (06 Feb 2021 12:32) (125/73 - 163/81)  BP(mean): --  RR: 18 (06 Feb 2021 11:00) (18 - 19)  SpO2: 96% (06 Feb 2021 11:00) (96% - 97%)    generalized weakness. awake, alert.   more comfortable, mucous membranes hydrated   lugs diminished air entry , generalized weakness  cardiac normal S1 and S2 no murmurs rubs or gallops, now lower extremity edema  abdomen soft non-tender, non-distended  lower extremities w/o edema  no rashes     LABS:                         11.7   13.86 )-----------( 348      ( 06 Feb 2021 07:57 )             39.0     02-06    147<H>  |  113<H>  |  26<H>  ----------------------------<  264<H>  3.5   |  27  |  0.90    Ca    9.0      06 Feb 2021 07:57  Phos  2.7     02-06  Mg     2.5     02-06    TPro  6.5  /  Alb  3.0<L>  /  TBili  0.6  /  DBili  x   /  AST  58<H>  /  ALT  33  /  AlkPhos  64  02-06                  RADIOLOGY, EKG & ADDITIONAL TESTS: Reviewed.       Culture - Blood (collected 03 Feb 2021 16:29)  Source: .Blood Blood-Venous  Preliminary Report (04 Feb 2021 17:01):    No growth to date.    Culture - Blood (collected 03 Feb 2021 16:29)  Source: .Blood Blood-Peripheral  Preliminary Report (04 Feb 2021 17:01):    No growth to date.        RADIOLOGY & ADDITIONAL TESTS:  Results Reviewed: y  Imaging Personally Reviewed:y  Electrocardiogram Personally Reviewed:y    COORDINATION OF CARE:  Care Discussed with Consultants/Other Providers [Y/N]:y  Prior or Outpatient Records Reviewed [Y/N]:y

## 2021-02-06 NOTE — PROGRESS NOTE ADULT - ASSESSMENT
70M HTN, CAD s/p TRACEE 2015, T2DM (poorly controlled) p/w fever, cough, diarrhea. Diagnosed w/ COVID w/ superimposed pneumonia. Hypoxic and intubated in ED on Jan 22. Patient extubated 1/31. Sputum culture grew MSSA + H influenzae. Course complicated by AMS due to hypernatremia (sodium 153). Sodium now improved 147 s/p IVF. Speech and swallow consulted due suspected aspiration. Started on dysphagia diet. Patient remains of zosyn for his pneumonia. Endocrine following for uncontrolled diabetes. Sugars 200's 2/6. Patient started on 1/2 NS today due to hypernatremia. Now that not NPO changed insulin today to 32 units lantus, MISS, and 7 units pre-meal. Patient weaned to RA today. Patient pending ETELVINA for dispo. Patient remains admitted for pneumonia treatment, electrolyte abnormalities and hyperglycemia. Last day of zosyn 2/9/20.

## 2021-02-06 NOTE — PROGRESS NOTE ADULT - NSHPATTENDINGPLANDISCUSS_GEN_ALL_CORE
discussed with primary team and nurse
Plan discussed with resident/fellow/nurse.
ACP: Pretty

## 2021-02-06 NOTE — PROGRESS NOTE ADULT - SUBJECTIVE AND OBJECTIVE BOX
Chief Complaint: DM 2, COVID      History: Patient seen at bedside. Per RN, he ate breakfast today. On dysphagia diet.   For hypernatremia is now on 0.45NS with improvement in glucose levels   Not on steroids  Most recent  mg/dl     MEDICATIONS  (STANDING):  amLODIPine   Tablet 10 milliGRAM(s) Oral daily  aspirin  chewable 81 milliGRAM(s) Oral daily  clopidogrel Tablet 75 milliGRAM(s) Oral daily  dextrose 40% Gel 15 Gram(s) Oral once  dextrose 5%. 1000 milliLiter(s) (50 mL/Hr) IV Continuous <Continuous>  dextrose 5%. 1000 milliLiter(s) (100 mL/Hr) IV Continuous <Continuous>  dextrose 50% Injectable 25 Gram(s) IV Push once  dextrose 50% Injectable 12.5 Gram(s) IV Push once  dorzolamide 2% Ophthalmic Solution 1 Drop(s) Both EYES every 8 hours  enoxaparin Injectable 40 milliGRAM(s) SubCutaneous daily  fenofibrate Tablet 145 milliGRAM(s) Oral daily  glucagon  Injectable 1 milliGRAM(s) IntraMuscular once  insulin glargine Injectable (LANTUS) 32 Unit(s) SubCutaneous at bedtime  insulin lispro (ADMELOG) corrective regimen sliding scale   SubCutaneous three times a day before meals  insulin lispro (ADMELOG) corrective regimen sliding scale   SubCutaneous at bedtime  insulin lispro Injectable (ADMELOG) 7 Unit(s) SubCutaneous three times a day before meals  isosorbide   mononitrate ER Tablet (IMDUR) 60 milliGRAM(s) Oral daily  latanoprost 0.005% Ophthalmic Solution 1 Drop(s) Both EYES at bedtime  piperacillin/tazobactam IVPB.. 3.375 Gram(s) IV Intermittent every 8 hours  simvastatin 20 milliGRAM(s) Oral at bedtime  sodium chloride 0.45%. 1000 milliLiter(s) (75 mL/Hr) IV Continuous <Continuous>  tamsulosin 0.4 milliGRAM(s) Oral at bedtime    MEDICATIONS  (PRN):  acetaminophen   Tablet .. 650 milliGRAM(s) Oral every 6 hours PRN Temp greater or equal to 38C (100.4F), Mild Pain (1 - 3), Moderate Pain (4 - 6), Severe Pain (7 - 10)  hydrALAZINE Injectable 10 milliGRAM(s) IV Push every 6 hours PRN SBP >180    No Known Allergies    Review of Systems:  Cardiovascular: No chest pain  Respiratory: No SOB  GI: No nausea, vomiting  Endocrine: no hypoglycemia     PHYSICAL EXAM:  VITALS: T(C): 36.5 (02-06-21 @ 11:00)  T(F): 97.7 (02-06-21 @ 11:00), Max: 98.2 (02-05-21 @ 21:30)  HR: 91 (02-06-21 @ 12:32) (88 - 93)  BP: 163/81 (02-06-21 @ 12:32) (125/73 - 163/81)  RR:  (18 - 19)  SpO2:  (96% - 97%)  Wt(kg): --  GENERAL: NAD  EYES: No proptosis, no lid lag, anicteric  HEENT:  Atraumatic, Normocephalic, moist mucous membranes  RESPIRATORY: unlabored respirations     CAPILLARY BLOOD GLUCOSE    POCT Blood Glucose.: 185 mg/dL (06 Feb 2021 13:01)  POCT Blood Glucose.: 225 mg/dL (06 Feb 2021 09:30)  POCT Blood Glucose.: 243 mg/dL (06 Feb 2021 03:06)  POCT Blood Glucose.: 258 mg/dL (05 Feb 2021 21:32)  POCT Blood Glucose.: 283 mg/dL (05 Feb 2021 17:31)      02-06    147<H>  |  113<H>  |  26<H>  ----------------------------<  264<H>  3.5   |  27  |  0.90    EGFR if : 99  EGFR if non : 85    Ca    9.0      02-06  Mg     2.5     02-06  Phos  2.7     02-06    TPro  6.5  /  Alb  3.0<L>  /  TBili  0.6  /  DBili  x   /  AST  58<H>  /  ALT  33  /  AlkPhos  64  02-06      A1C with Estimated Average Glucose Result: 9.6 % (01-22-21 @ 13:19)    Diet, Dysphagia 1 Pureed-Honey Consistency Fluid (02-05-21 @ 12:22)

## 2021-02-06 NOTE — PROGRESS NOTE ADULT - ASSESSMENT
70 year old man with PMH CAD s/p stents x3 (2015), uncontrolled DM2, HTN, HLD, BPH who presents to the hospital with worsening SOB, admitted with +COVID. Endocrine consult called for management of uncontrolled DM2 in critically ill patient, initially intubated for COVID infection. Pt now extubated on O2 via NC. Pt with inpt course complicated by hypernatremia requiring dextrose, with hyperglycemia as a result     1. Uncontrolled type 2 diabetes mellitus with hyperglycemia.    -A1c 9.6%  -Inpatient BG target 100-180 mg/dl   -Now off dextrose IVF for hypernatremia, on 0.45NS, glucose levels improving  -Also restarted on diet - dysphagia diet, tolerating  -Received Lantus 30 units last night, glucose 225 mg/dl this AM - agree with Lantus 32 units for tonight   -Continue Admelog 7 units SQ TID before meals (Hold if NPO/not eating meal)   -Continue Admelog MODERATE dose correctional scale before meals and bedtime  -Consistent carbohydrate diet consideration  -Check BG before meals and bedtime   -Keep hypoglycemia protocol active     Discharge Plan:  -Was on basal/bolus pens prior to admit (Novolog and Basaglar) plus 3 oral agents  -Anticipate resuming basal/bolus with dose TBD at discharge  -Will follow     2. Hyperlipidemia, unspecified hyperlipidemia type.    -Continue statin and fibrate   -Check lipids as outpatient.     3. Essential hypertension  -Mgmt per primary team.     Luz Sheehan  Nurse Practitioner  Division of Endocrinology & Diabetes  In house pager #[phone]/long range pager #[phone]    If before 9AM or after 6PM, or on weekends/holidays, please call endocrine answering service for assistance ([phone]).  For nonurgent matters email [email or web address] for assistance.

## 2021-02-07 DIAGNOSIS — Z02.9 ENCOUNTER FOR ADMINISTRATIVE EXAMINATIONS, UNSPECIFIED: ICD-10-CM

## 2021-02-07 LAB
BASOPHILS # BLD AUTO: 0.07 K/UL — SIGNIFICANT CHANGE UP (ref 0–0.2)
BASOPHILS NFR BLD AUTO: 0.6 % — SIGNIFICANT CHANGE UP (ref 0–2)
EOSINOPHIL # BLD AUTO: 0.19 K/UL — SIGNIFICANT CHANGE UP (ref 0–0.5)
EOSINOPHIL NFR BLD AUTO: 1.5 % — SIGNIFICANT CHANGE UP (ref 0–6)
GLUCOSE BLDC GLUCOMTR-MCNC: 115 MG/DL — HIGH (ref 70–99)
GLUCOSE BLDC GLUCOMTR-MCNC: 127 MG/DL — HIGH (ref 70–99)
GLUCOSE BLDC GLUCOMTR-MCNC: 138 MG/DL — HIGH (ref 70–99)
GLUCOSE BLDC GLUCOMTR-MCNC: 141 MG/DL — HIGH (ref 70–99)
GLUCOSE BLDC GLUCOMTR-MCNC: 159 MG/DL — HIGH (ref 70–99)
GLUCOSE BLDC GLUCOMTR-MCNC: 70 MG/DL — SIGNIFICANT CHANGE UP (ref 70–99)
GLUCOSE BLDC GLUCOMTR-MCNC: 75 MG/DL — SIGNIFICANT CHANGE UP (ref 70–99)
GLUCOSE BLDC GLUCOMTR-MCNC: 92 MG/DL — SIGNIFICANT CHANGE UP (ref 70–99)
HCT VFR BLD CALC: 39.6 % — SIGNIFICANT CHANGE UP (ref 39–50)
HGB BLD-MCNC: 11.6 G/DL — LOW (ref 13–17)
IANC: 8.8 K/UL — HIGH (ref 1.5–8.5)
IMM GRANULOCYTES NFR BLD AUTO: 1.5 % — SIGNIFICANT CHANGE UP (ref 0–1.5)
LYMPHOCYTES # BLD AUTO: 2.53 K/UL — SIGNIFICANT CHANGE UP (ref 1–3.3)
LYMPHOCYTES # BLD AUTO: 20.3 % — SIGNIFICANT CHANGE UP (ref 13–44)
MCHC RBC-ENTMCNC: 25.6 PG — LOW (ref 27–34)
MCHC RBC-ENTMCNC: 29.3 GM/DL — LOW (ref 32–36)
MCV RBC AUTO: 87.2 FL — SIGNIFICANT CHANGE UP (ref 80–100)
MONOCYTES # BLD AUTO: 0.67 K/UL — SIGNIFICANT CHANGE UP (ref 0–0.9)
MONOCYTES NFR BLD AUTO: 5.4 % — SIGNIFICANT CHANGE UP (ref 2–14)
NEUTROPHILS # BLD AUTO: 8.8 K/UL — HIGH (ref 1.8–7.4)
NEUTROPHILS NFR BLD AUTO: 70.7 % — SIGNIFICANT CHANGE UP (ref 43–77)
NRBC # BLD: 0 /100 WBCS — SIGNIFICANT CHANGE UP
NRBC # FLD: 0 K/UL — SIGNIFICANT CHANGE UP
PLATELET # BLD AUTO: 315 K/UL — SIGNIFICANT CHANGE UP (ref 150–400)
RBC # BLD: 4.54 M/UL — SIGNIFICANT CHANGE UP (ref 4.2–5.8)
RBC # FLD: 13.7 % — SIGNIFICANT CHANGE UP (ref 10.3–14.5)
WBC # BLD: 12.45 K/UL — HIGH (ref 3.8–10.5)
WBC # FLD AUTO: 12.45 K/UL — HIGH (ref 3.8–10.5)

## 2021-02-07 PROCEDURE — 99232 SBSQ HOSP IP/OBS MODERATE 35: CPT

## 2021-02-07 RX ORDER — LOSARTAN POTASSIUM 100 MG/1
25 TABLET, FILM COATED ORAL DAILY
Refills: 0 | Status: DISCONTINUED | OUTPATIENT
Start: 2021-02-07 | End: 2021-02-09

## 2021-02-07 RX ORDER — BENZOCAINE AND MENTHOL 5; 1 G/100ML; G/100ML
1 LIQUID ORAL
Refills: 0 | Status: DISCONTINUED | OUTPATIENT
Start: 2021-02-07 | End: 2021-02-07

## 2021-02-07 RX ORDER — INSULIN LISPRO 100/ML
5 VIAL (ML) SUBCUTANEOUS
Refills: 0 | Status: DISCONTINUED | OUTPATIENT
Start: 2021-02-07 | End: 2021-02-10

## 2021-02-07 RX ADMIN — PIPERACILLIN AND TAZOBACTAM 25 GRAM(S): 4; .5 INJECTION, POWDER, LYOPHILIZED, FOR SOLUTION INTRAVENOUS at 23:28

## 2021-02-07 RX ADMIN — DORZOLAMIDE HYDROCHLORIDE 1 DROP(S): 20 SOLUTION/ DROPS OPHTHALMIC at 14:33

## 2021-02-07 RX ADMIN — PIPERACILLIN AND TAZOBACTAM 25 GRAM(S): 4; .5 INJECTION, POWDER, LYOPHILIZED, FOR SOLUTION INTRAVENOUS at 05:14

## 2021-02-07 RX ADMIN — ENOXAPARIN SODIUM 40 MILLIGRAM(S): 100 INJECTION SUBCUTANEOUS at 14:31

## 2021-02-07 RX ADMIN — DORZOLAMIDE HYDROCHLORIDE 1 DROP(S): 20 SOLUTION/ DROPS OPHTHALMIC at 05:14

## 2021-02-07 RX ADMIN — PIPERACILLIN AND TAZOBACTAM 25 GRAM(S): 4; .5 INJECTION, POWDER, LYOPHILIZED, FOR SOLUTION INTRAVENOUS at 14:31

## 2021-02-07 RX ADMIN — INSULIN GLARGINE 32 UNIT(S): 100 INJECTION, SOLUTION SUBCUTANEOUS at 22:01

## 2021-02-07 RX ADMIN — TAMSULOSIN HYDROCHLORIDE 0.4 MILLIGRAM(S): 0.4 CAPSULE ORAL at 23:28

## 2021-02-07 RX ADMIN — Medication 5 UNIT(S): at 13:48

## 2021-02-07 RX ADMIN — Medication 145 MILLIGRAM(S): at 14:31

## 2021-02-07 RX ADMIN — CLOPIDOGREL BISULFATE 75 MILLIGRAM(S): 75 TABLET, FILM COATED ORAL at 14:31

## 2021-02-07 RX ADMIN — Medication 81 MILLIGRAM(S): at 14:31

## 2021-02-07 RX ADMIN — SIMVASTATIN 20 MILLIGRAM(S): 20 TABLET, FILM COATED ORAL at 23:28

## 2021-02-07 RX ADMIN — LOSARTAN POTASSIUM 25 MILLIGRAM(S): 100 TABLET, FILM COATED ORAL at 14:31

## 2021-02-07 RX ADMIN — DORZOLAMIDE HYDROCHLORIDE 1 DROP(S): 20 SOLUTION/ DROPS OPHTHALMIC at 23:28

## 2021-02-07 RX ADMIN — ISOSORBIDE MONONITRATE 60 MILLIGRAM(S): 60 TABLET, EXTENDED RELEASE ORAL at 14:33

## 2021-02-07 RX ADMIN — AMLODIPINE BESYLATE 10 MILLIGRAM(S): 2.5 TABLET ORAL at 05:14

## 2021-02-07 RX ADMIN — Medication 7 UNIT(S): at 09:59

## 2021-02-07 NOTE — PROGRESS NOTE ADULT - SUBJECTIVE AND OBJECTIVE BOX
Rebeca Morenois   Pager [phone]     PROGRESS NOTE:   Patient is a 70y old  Male who presents with a chief complaint of Acute hypoxic respiratory failure 2' COVID-19 PNA (05 Feb 2021 14:12)    SUBJECTIVE / OVERNIGHT EVENTS:  patient feels well.  patient feeling home sick. patient reports some generalized fatigue and weakness. Patient otherwise w/o complaints. ROS otherwise negative.     MEDICATIONS  (STANDING):  amLODIPine   Tablet 10 milliGRAM(s) Oral daily  aspirin  chewable 81 milliGRAM(s) Oral daily  clopidogrel Tablet 75 milliGRAM(s) Oral daily  dextrose 40% Gel 15 Gram(s) Oral once  dextrose 5%. 1000 milliLiter(s) (50 mL/Hr) IV Continuous <Continuous>  dextrose 5%. 1000 milliLiter(s) (100 mL/Hr) IV Continuous <Continuous>  dextrose 50% Injectable 25 Gram(s) IV Push once  dextrose 50% Injectable 12.5 Gram(s) IV Push once  dorzolamide 2% Ophthalmic Solution 1 Drop(s) Both EYES every 8 hours  enoxaparin Injectable 40 milliGRAM(s) SubCutaneous daily  fenofibrate Tablet 145 milliGRAM(s) Oral daily  glucagon  Injectable 1 milliGRAM(s) IntraMuscular once  insulin glargine Injectable (LANTUS) 32 Unit(s) SubCutaneous at bedtime  insulin lispro (ADMELOG) corrective regimen sliding scale   SubCutaneous three times a day before meals  insulin lispro (ADMELOG) corrective regimen sliding scale   SubCutaneous at bedtime  insulin lispro Injectable (ADMELOG) 5 Unit(s) SubCutaneous three times a day before meals  isosorbide   mononitrate ER Tablet (IMDUR) 60 milliGRAM(s) Oral daily  latanoprost 0.005% Ophthalmic Solution 1 Drop(s) Both EYES at bedtime  losartan 25 milliGRAM(s) Oral daily  piperacillin/tazobactam IVPB.. 3.375 Gram(s) IV Intermittent every 8 hours  simvastatin 20 milliGRAM(s) Oral at bedtime  sodium chloride 0.45%. 1000 milliLiter(s) (75 mL/Hr) IV Continuous <Continuous>  tamsulosin 0.4 milliGRAM(s) Oral at bedtime    MEDICATIONS  (PRN):  acetaminophen   Tablet .. 650 milliGRAM(s) Oral every 6 hours PRN Temp greater or equal to 38C (100.4F), Mild Pain (1 - 3), Moderate Pain (4 - 6), Severe Pain (7 - 10)  hydrALAZINE Injectable 10 milliGRAM(s) IV Push every 6 hours PRN SBP >180    CAPILLARY BLOOD GLUCOSE  POCT Blood Glucose.: 159 mg/dL (07 Feb 2021 21:28)  POCT Blood Glucose.: 115 mg/dL (07 Feb 2021 19:30)  POCT Blood Glucose.: 92 mg/dL (07 Feb 2021 18:58)  POCT Blood Glucose.: 75 mg/dL (07 Feb 2021 18:17)  POCT Blood Glucose.: 70 mg/dL (07 Feb 2021 17:57)  POCT Blood Glucose.: 127 mg/dL (07 Feb 2021 13:18)  POCT Blood Glucose.: 138 mg/dL (07 Feb 2021 09:29)  POCT Blood Glucose.: 141 mg/dL (07 Feb 2021 05:10)      I&O's Summary    05 Feb 2021 07:01  -  06 Feb 2021 07:00  --------------------------------------------------------  IN: 0 mL / OUT: 900 mL / NET: -900 mL    Vital Signs Last 24 Hrs  T(C): 36.8 (07 Feb 2021 21:58), Max: 36.9 (07 Feb 2021 14:30)  T(F): 98.2 (07 Feb 2021 21:58), Max: 98.4 (07 Feb 2021 14:30)  HR: 86 (07 Feb 2021 21:58) (82 - 86)  BP: 138/66 (07 Feb 2021 21:58) (138/66 - 165/76)  BP(mean): --  RR: 16 (07 Feb 2021 21:58) (16 - 18)  SpO2: 99% (07 Feb 2021 21:58) (95% - 99%)    generalized weakness. awake, alert.   more comfortable, mucous membranes hydrated   lugs diminished air entry , generalized weakness  cardiac normal S1 and S2 no murmurs rubs or gallops, now lower extremity edema  abdomen soft non-tender, non-distended  lower extremities w/o edema  no rashes     LABS:                         11.7   13.86 )-----------( 348      ( 06 Feb 2021 07:57 )             39.0     02-06    147<H>  |  113<H>  |  26<H>  ----------------------------<  264<H>  3.5   |  27  |  0.90    Ca    9.0      06 Feb 2021 07:57  Phos  2.7     02-06  Mg     2.5     02-06    TPro  6.5  /  Alb  3.0<L>  /  TBili  0.6  /  DBili  x   /  AST  58<H>  /  ALT  33  /  AlkPhos  64  02-06                  RADIOLOGY, EKG & ADDITIONAL TESTS: Reviewed.       Culture - Blood (collected 03 Feb 2021 16:29)  Source: .Blood Blood-Venous  Preliminary Report (04 Feb 2021 17:01):    No growth to date.    Culture - Blood (collected 03 Feb 2021 16:29)  Source: .Blood Blood-Peripheral  Preliminary Report (04 Feb 2021 17:01):    No growth to date.        RADIOLOGY & ADDITIONAL TESTS:  Results Reviewed: y  Imaging Personally Reviewed:y  Electrocardiogram Personally Reviewed:y    COORDINATION OF CARE:  Care Discussed with Consultants/Other Providers [Y/N]:y  Prior or Outpatient Records Reviewed [Y/N]:y

## 2021-02-07 NOTE — SWALLOW BEDSIDE ASSESSMENT ADULT - ADDITIONAL RECOMMENDATIONS
1) Monitor for PO tolerance/intake   2) Monitor for change in neurological status that may impact oral intake.

## 2021-02-07 NOTE — CHART NOTE - NSCHARTNOTEFT_GEN_A_CORE
70 year old man with PMH CAD s/p stents x3 (2015), uncontrolled DM2, HTN, HLD, BPH who presents to the hospital with worsening SOB, admitted with +COVID. Endocrine consult called for management of uncontrolled DM2 in critically ill patient, initially intubated for COVID infection. Pt now extubated on O2 via NC. Pt with inpt course complicated by hypernatremia requiring dextrose, with hyperglycemia as a result     BG, insulin administration and chart reviewed  Noted patient had tightly controlled glucose last night, 79 mg/dl at 1806  When seen yesterday, patient was tolerating dysphagia diet, had been taken off dextrose IVF (for hypernatremia) and placed on 0.45NS. Today, not on IVF  Recommend to decrease Admelog to 5 units SQ TID before meals (Hold if NPO/not eating meal)  Continue Lantus 32 units SQ qHS   Consistent carbohydrate consideration with diet  Check BG before meals and bedtime  See prior endocrine progress notes for complete plan of care including discharge planning  Will follow    CAPILLARY BLOOD GLUCOSE    POCT Blood Glucose.: 138 mg/dL (07 Feb 2021 09:29)  POCT Blood Glucose.: 141 mg/dL (07 Feb 2021 05:10)  POCT Blood Glucose.: 134 mg/dL (06 Feb 2021 21:28)  POCT Blood Glucose.: 79 mg/dL (06 Feb 2021 18:06)  POCT Blood Glucose.: 185 mg/dL (06 Feb 2021 13:01)    02-06    147<H>  |  113<H>  |  26<H>  ----------------------------<  264<H>  3.5   |  27  |  0.90    Ca    9.0      06 Feb 2021 07:57  Phos  2.7     02-06  Mg     2.5     02-06    TPro  6.5  /  Alb  3.0<L>  /  TBili  0.6  /  DBili  x   /  AST  58<H>  /  ALT  33  /  AlkPhos  64  02-06      MEDICATIONS  (STANDING):  amLODIPine   Tablet 10 milliGRAM(s) Oral daily  aspirin  chewable 81 milliGRAM(s) Oral daily  clopidogrel Tablet 75 milliGRAM(s) Oral daily  dextrose 40% Gel 15 Gram(s) Oral once  dextrose 5%. 1000 milliLiter(s) (50 mL/Hr) IV Continuous <Continuous>  dextrose 5%. 1000 milliLiter(s) (100 mL/Hr) IV Continuous <Continuous>  dextrose 50% Injectable 25 Gram(s) IV Push once  dextrose 50% Injectable 12.5 Gram(s) IV Push once  dorzolamide 2% Ophthalmic Solution 1 Drop(s) Both EYES every 8 hours  enoxaparin Injectable 40 milliGRAM(s) SubCutaneous daily  fenofibrate Tablet 145 milliGRAM(s) Oral daily  glucagon  Injectable 1 milliGRAM(s) IntraMuscular once  insulin glargine Injectable (LANTUS) 32 Unit(s) SubCutaneous at bedtime  insulin lispro (ADMELOG) corrective regimen sliding scale   SubCutaneous three times a day before meals  insulin lispro (ADMELOG) corrective regimen sliding scale   SubCutaneous at bedtime  insulin lispro Injectable (ADMELOG) 7 Unit(s) SubCutaneous three times a day before meals  isosorbide   mononitrate ER Tablet (IMDUR) 60 milliGRAM(s) Oral daily  latanoprost 0.005% Ophthalmic Solution 1 Drop(s) Both EYES at bedtime  losartan 25 milliGRAM(s) Oral daily  piperacillin/tazobactam IVPB.. 3.375 Gram(s) IV Intermittent every 8 hours  simvastatin 20 milliGRAM(s) Oral at bedtime  sodium chloride 0.45%. 1000 milliLiter(s) (75 mL/Hr) IV Continuous <Continuous>  tamsulosin 0.4 milliGRAM(s) Oral at bedtime    Diet, Dysphagia 1 Pureed-Honey Consistency Fluid (02-05-21 @ 12:22)    A1C with Estimated Average Glucose Result: 9.6 % (01-22-21 @ 13:19)    Luz Sheehan  Nurse Practitioner  Division of Endocrinology & Diabetes  In house pager #[phone]/long range pager #[phone]    If before 9AM or after 6PM, or on weekends/holidays, please call endocrine answering service for assistance ([phone]).  For nonurgent matters email [email or web address] for [email or web address]. 70 year old man with PMH CAD s/p stents x3 (2015), uncontrolled DM2, HTN, HLD, BPH who presents to the hospital with worsening SOB, admitted with +COVID. Endocrine consult called for management of uncontrolled DM2 in critically ill patient, initially intubated for COVID infection. Pt now extubated on O2 via NC. Pt with inpt course complicated by hypernatremia requiring dextrose, with hyperglycemia as a result     BG, insulin administration and chart reviewed  Noted patient had tightly controlled glucose last night, 79 mg/dl at 1806  When seen yesterday, patient was tolerating dysphagia diet, had been taken off dextrose IVF (for hypernatremia) and placed on 0.45NS. Today, not on IVF  Recommend to decrease Admelog to 5 units SQ TID before meals (Hold if NPO/not eating meal)  Continue Admelog MODERATE dose correctional scales as currently ordered   Continue Lantus 32 units SQ qHS   Consistent carbohydrate consideration with diet  Check BG before meals and bedtime  See prior endocrine progress notes for complete plan of care including discharge planning  Will follow    CAPILLARY BLOOD GLUCOSE    POCT Blood Glucose.: 138 mg/dL (07 Feb 2021 09:29)  POCT Blood Glucose.: 141 mg/dL (07 Feb 2021 05:10)  POCT Blood Glucose.: 134 mg/dL (06 Feb 2021 21:28)  POCT Blood Glucose.: 79 mg/dL (06 Feb 2021 18:06)  POCT Blood Glucose.: 185 mg/dL (06 Feb 2021 13:01)    02-06    147<H>  |  113<H>  |  26<H>  ----------------------------<  264<H>  3.5   |  27  |  0.90    Ca    9.0      06 Feb 2021 07:57  Phos  2.7     02-06  Mg     2.5     02-06    TPro  6.5  /  Alb  3.0<L>  /  TBili  0.6  /  DBili  x   /  AST  58<H>  /  ALT  33  /  AlkPhos  64  02-06      MEDICATIONS  (STANDING):  amLODIPine   Tablet 10 milliGRAM(s) Oral daily  aspirin  chewable 81 milliGRAM(s) Oral daily  clopidogrel Tablet 75 milliGRAM(s) Oral daily  dextrose 40% Gel 15 Gram(s) Oral once  dextrose 5%. 1000 milliLiter(s) (50 mL/Hr) IV Continuous <Continuous>  dextrose 5%. 1000 milliLiter(s) (100 mL/Hr) IV Continuous <Continuous>  dextrose 50% Injectable 25 Gram(s) IV Push once  dextrose 50% Injectable 12.5 Gram(s) IV Push once  dorzolamide 2% Ophthalmic Solution 1 Drop(s) Both EYES every 8 hours  enoxaparin Injectable 40 milliGRAM(s) SubCutaneous daily  fenofibrate Tablet 145 milliGRAM(s) Oral daily  glucagon  Injectable 1 milliGRAM(s) IntraMuscular once  insulin glargine Injectable (LANTUS) 32 Unit(s) SubCutaneous at bedtime  insulin lispro (ADMELOG) corrective regimen sliding scale   SubCutaneous three times a day before meals  insulin lispro (ADMELOG) corrective regimen sliding scale   SubCutaneous at bedtime  insulin lispro Injectable (ADMELOG) 7 Unit(s) SubCutaneous three times a day before meals  isosorbide   mononitrate ER Tablet (IMDUR) 60 milliGRAM(s) Oral daily  latanoprost 0.005% Ophthalmic Solution 1 Drop(s) Both EYES at bedtime  losartan 25 milliGRAM(s) Oral daily  piperacillin/tazobactam IVPB.. 3.375 Gram(s) IV Intermittent every 8 hours  simvastatin 20 milliGRAM(s) Oral at bedtime  sodium chloride 0.45%. 1000 milliLiter(s) (75 mL/Hr) IV Continuous <Continuous>  tamsulosin 0.4 milliGRAM(s) Oral at bedtime    Diet, Dysphagia 1 Pureed-Honey Consistency Fluid (02-05-21 @ 12:22)    A1C with Estimated Average Glucose Result: 9.6 % (01-22-21 @ 13:19)    Luz Sheehan  Nurse Practitioner  Division of Endocrinology & Diabetes  In house pager #[phone]/long range pager #[phone]    If before 9AM or after 6PM, or on weekends/holidays, please call endocrine answering service for assistance ([phone]).  For nonurgent matters email [email or web address] for assistance.

## 2021-02-07 NOTE — SWALLOW BEDSIDE ASSESSMENT ADULT - ASR SWALLOW ASPIRATION MONITOR
cough/gurgly voice/fever/throat clearing
change of breathing pattern/oral hygiene/position upright (90Y)/cough/gurgly voice/fever/pneumonia/throat clearing/upper respiratory infection

## 2021-02-07 NOTE — PROGRESS NOTE ADULT - ASSESSMENT
70M HTN, CAD s/p TRACEE 2015, T2DM (poorly controlled) p/w fever, cough, diarrhea. Diagnosed w/ COVID w/ superimposed pneumonia. Hypoxic and intubated in ED on Jan 22. Patient extubated 1/31. Sputum culture grew MSSA + H influenzae. Course complicated by AMS due to hypernatremia (sodium 153). Sodium now improved 147 s/p IVF. Speech and swallow consulted due suspected aspiration. Started on dysphagia diet. Endocrine following for uncontrolled diabetes. Patient pending ETELVINA for dispo. Patient now on RA. Last day abx 2/9, can be d'cd on orals for d'c.

## 2021-02-07 NOTE — SWALLOW BEDSIDE ASSESSMENT ADULT - SWALLOW EVAL: DIAGNOSIS
1. Patient demonstrated mild oral dysphagia with puree, mechanical soft solids, honey-thick liquids and nectar-thick liquids characterized by reduced stripping of bolus from utensil, slow bolus manipulation and propulsion, adequate oral clearance. 2. Patient demonstrated functional pharyngeal phase of swallow with puree and honey-thick liquids characterized by adequate pharyngeal swallow trigger and present hyolaryngeal elevation upon digital palpation. No overt signs or symptoms of aspiration/penetration noted. 3. Patient demonstrated severe pharyngeal dysphagia with mechanical soft solids and nectar-thick liquids characterized by suspected delay in pharyngeal swallow trigger and reduced hyolaryngeal elevation upon digital palpation. Wet delayed coughing noted post intake, suggestive of airway aspiration/penetration.
Patient presents with moderate oropharyngeal dysphagia. Patient accepted trials of thin, nectar, and honey thick liquids, puree and mechanical soft solids. Oral phase characterized by adequate acceptance and containment across all trials. Mastication was slow and reduced on mechanical soft due to endentulous state. Significant residue noted on lingual surface after the swallow on trials of mechanical soft. Pharyngeal phase characterized by hyolaryngeal elevation and excursion appreciated upon digital palpation, suspected delayed swallow onset on thin liquid trials. Patient with immediate cough after thin liquid trials indicative of laryngeal penetration and or aspiraton. No overt signs or symptoms noted on trials of nectar and honey thick liquids, puree, or mechanical soft solids.

## 2021-02-07 NOTE — SWALLOW BEDSIDE ASSESSMENT ADULT - COMMENTS
As per provider handoff patient is a "70M with PMH CAD s/p stents x3 (2015), DM2, HTN, HLD, BPH admitted for acute hypoxic respiratory failure 2' COVID-19 PNA."    Patient known to this service, seen for Bedside Swallow Assessment 2/1 and 2/3 see evaluation for details and recommendations. As per MD, patient with improved mentation at this time.     Received upright in bed, able to follow simple directions, oral care provided prior to initiation of PO trials.

## 2021-02-07 NOTE — SWALLOW BEDSIDE ASSESSMENT ADULT - SWALLOW EVAL: RECOMMENDED FEEDING/EATING TECHNIQUES
allow for swallow between intakes/check mouth frequently for oral residue/pocketing/crush medication (when feasible)/maintain upright posture during/after eating for 30 mins/oral hygiene/position upright (90 degrees)
position upright (90 degrees)

## 2021-02-08 LAB
ANION GAP SERPL CALC-SCNC: 12 MMOL/L — SIGNIFICANT CHANGE UP (ref 7–14)
BUN SERPL-MCNC: 16 MG/DL — SIGNIFICANT CHANGE UP (ref 7–23)
CALCIUM SERPL-MCNC: 8.6 MG/DL — SIGNIFICANT CHANGE UP (ref 8.4–10.5)
CHLORIDE SERPL-SCNC: 109 MMOL/L — HIGH (ref 98–107)
CO2 SERPL-SCNC: 22 MMOL/L — SIGNIFICANT CHANGE UP (ref 22–31)
CREAT SERPL-MCNC: 0.83 MG/DL — SIGNIFICANT CHANGE UP (ref 0.5–1.3)
CRP SERPL-MCNC: 14.8 MG/L — HIGH
CULTURE RESULTS: SIGNIFICANT CHANGE UP
CULTURE RESULTS: SIGNIFICANT CHANGE UP
FERRITIN SERPL-MCNC: 412 NG/ML — HIGH (ref 30–400)
GLUCOSE BLDC GLUCOMTR-MCNC: 167 MG/DL — HIGH (ref 70–99)
GLUCOSE BLDC GLUCOMTR-MCNC: 172 MG/DL — HIGH (ref 70–99)
GLUCOSE BLDC GLUCOMTR-MCNC: 187 MG/DL — HIGH (ref 70–99)
GLUCOSE BLDC GLUCOMTR-MCNC: 223 MG/DL — HIGH (ref 70–99)
GLUCOSE SERPL-MCNC: 166 MG/DL — HIGH (ref 70–99)
LDH SERPL L TO P-CCNC: 388 U/L — HIGH (ref 135–225)
MAGNESIUM SERPL-MCNC: 2 MG/DL — SIGNIFICANT CHANGE UP (ref 1.6–2.6)
PHOSPHATE SERPL-MCNC: 2.9 MG/DL — SIGNIFICANT CHANGE UP (ref 2.5–4.5)
POTASSIUM SERPL-MCNC: 4 MMOL/L — SIGNIFICANT CHANGE UP (ref 3.5–5.3)
POTASSIUM SERPL-SCNC: 4 MMOL/L — SIGNIFICANT CHANGE UP (ref 3.5–5.3)
SARS-COV-2 RNA SPEC QL NAA+PROBE: DETECTED
SODIUM SERPL-SCNC: 143 MMOL/L — SIGNIFICANT CHANGE UP (ref 135–145)
SPECIMEN SOURCE: SIGNIFICANT CHANGE UP
SPECIMEN SOURCE: SIGNIFICANT CHANGE UP

## 2021-02-08 PROCEDURE — 99497 ADVNCD CARE PLAN 30 MIN: CPT | Mod: 25

## 2021-02-08 PROCEDURE — 99232 SBSQ HOSP IP/OBS MODERATE 35: CPT

## 2021-02-08 RX ADMIN — PIPERACILLIN AND TAZOBACTAM 25 GRAM(S): 4; .5 INJECTION, POWDER, LYOPHILIZED, FOR SOLUTION INTRAVENOUS at 15:24

## 2021-02-08 RX ADMIN — PIPERACILLIN AND TAZOBACTAM 25 GRAM(S): 4; .5 INJECTION, POWDER, LYOPHILIZED, FOR SOLUTION INTRAVENOUS at 04:51

## 2021-02-08 RX ADMIN — Medication 5 UNIT(S): at 09:48

## 2021-02-08 RX ADMIN — AMLODIPINE BESYLATE 10 MILLIGRAM(S): 2.5 TABLET ORAL at 04:51

## 2021-02-08 RX ADMIN — LATANOPROST 1 DROP(S): 0.05 SOLUTION/ DROPS OPHTHALMIC; TOPICAL at 00:55

## 2021-02-08 RX ADMIN — ISOSORBIDE MONONITRATE 60 MILLIGRAM(S): 60 TABLET, EXTENDED RELEASE ORAL at 15:24

## 2021-02-08 RX ADMIN — ENOXAPARIN SODIUM 40 MILLIGRAM(S): 100 INJECTION SUBCUTANEOUS at 15:24

## 2021-02-08 RX ADMIN — TAMSULOSIN HYDROCHLORIDE 0.4 MILLIGRAM(S): 0.4 CAPSULE ORAL at 22:06

## 2021-02-08 RX ADMIN — Medication 4: at 13:22

## 2021-02-08 RX ADMIN — SIMVASTATIN 20 MILLIGRAM(S): 20 TABLET, FILM COATED ORAL at 22:06

## 2021-02-08 RX ADMIN — Medication 5 UNIT(S): at 18:31

## 2021-02-08 RX ADMIN — DORZOLAMIDE HYDROCHLORIDE 1 DROP(S): 20 SOLUTION/ DROPS OPHTHALMIC at 04:52

## 2021-02-08 RX ADMIN — Medication 145 MILLIGRAM(S): at 15:23

## 2021-02-08 RX ADMIN — INSULIN GLARGINE 32 UNIT(S): 100 INJECTION, SOLUTION SUBCUTANEOUS at 22:06

## 2021-02-08 RX ADMIN — DORZOLAMIDE HYDROCHLORIDE 1 DROP(S): 20 SOLUTION/ DROPS OPHTHALMIC at 15:23

## 2021-02-08 RX ADMIN — LOSARTAN POTASSIUM 25 MILLIGRAM(S): 100 TABLET, FILM COATED ORAL at 06:55

## 2021-02-08 RX ADMIN — CLOPIDOGREL BISULFATE 75 MILLIGRAM(S): 75 TABLET, FILM COATED ORAL at 15:24

## 2021-02-08 RX ADMIN — Medication 5 UNIT(S): at 13:22

## 2021-02-08 RX ADMIN — PIPERACILLIN AND TAZOBACTAM 25 GRAM(S): 4; .5 INJECTION, POWDER, LYOPHILIZED, FOR SOLUTION INTRAVENOUS at 22:06

## 2021-02-08 RX ADMIN — Medication 2: at 09:48

## 2021-02-08 RX ADMIN — LATANOPROST 1 DROP(S): 0.05 SOLUTION/ DROPS OPHTHALMIC; TOPICAL at 22:06

## 2021-02-08 RX ADMIN — DORZOLAMIDE HYDROCHLORIDE 1 DROP(S): 20 SOLUTION/ DROPS OPHTHALMIC at 22:06

## 2021-02-08 RX ADMIN — Medication 2: at 18:31

## 2021-02-08 RX ADMIN — Medication 81 MILLIGRAM(S): at 15:24

## 2021-02-08 NOTE — PROGRESS NOTE ADULT - SUBJECTIVE AND OBJECTIVE BOX
PROGRESS NOTE:     Patient is a 70y old  Male who presents with a chief complaint of Acute hypoxic respiratory failure 2' COVID-19 PNA (08 Feb 2021 18:41)      SUBJECTIVE / OVERNIGHT EVENTS:  denies fever   ADDITIONAL REVIEW OF SYSTEMS:  cristela acharya  MEDICATIONS  (STANDING):  amLODIPine   Tablet 10 milliGRAM(s) Oral daily  aspirin  chewable 81 milliGRAM(s) Oral daily  clopidogrel Tablet 75 milliGRAM(s) Oral daily  dextrose 40% Gel 15 Gram(s) Oral once  dextrose 5%. 1000 milliLiter(s) (50 mL/Hr) IV Continuous <Continuous>  dextrose 5%. 1000 milliLiter(s) (100 mL/Hr) IV Continuous <Continuous>  dextrose 50% Injectable 25 Gram(s) IV Push once  dextrose 50% Injectable 12.5 Gram(s) IV Push once  dorzolamide 2% Ophthalmic Solution 1 Drop(s) Both EYES every 8 hours  enoxaparin Injectable 40 milliGRAM(s) SubCutaneous daily  fenofibrate Tablet 145 milliGRAM(s) Oral daily  glucagon  Injectable 1 milliGRAM(s) IntraMuscular once  insulin glargine Injectable (LANTUS) 32 Unit(s) SubCutaneous at bedtime  insulin lispro (ADMELOG) corrective regimen sliding scale   SubCutaneous three times a day before meals  insulin lispro (ADMELOG) corrective regimen sliding scale   SubCutaneous at bedtime  insulin lispro Injectable (ADMELOG) 5 Unit(s) SubCutaneous three times a day before meals  isosorbide   mononitrate ER Tablet (IMDUR) 60 milliGRAM(s) Oral daily  latanoprost 0.005% Ophthalmic Solution 1 Drop(s) Both EYES at bedtime  losartan 25 milliGRAM(s) Oral daily  piperacillin/tazobactam IVPB.. 3.375 Gram(s) IV Intermittent every 8 hours  simvastatin 20 milliGRAM(s) Oral at bedtime  tamsulosin 0.4 milliGRAM(s) Oral at bedtime    MEDICATIONS  (PRN):  acetaminophen   Tablet .. 650 milliGRAM(s) Oral every 6 hours PRN Temp greater or equal to 38C (100.4F), Mild Pain (1 - 3), Moderate Pain (4 - 6), Severe Pain (7 - 10)  hydrALAZINE Injectable 10 milliGRAM(s) IV Push every 6 hours PRN SBP >180      CAPILLARY BLOOD GLUCOSE      POCT Blood Glucose.: 187 mg/dL (08 Feb 2021 18:15)  POCT Blood Glucose.: 223 mg/dL (08 Feb 2021 12:55)  POCT Blood Glucose.: 167 mg/dL (08 Feb 2021 09:46)  POCT Blood Glucose.: 159 mg/dL (07 Feb 2021 21:28)    I&O's Summary    07 Feb 2021 07:01  -  08 Feb 2021 07:00  --------------------------------------------------------  IN: 0 mL / OUT: 800 mL / NET: -800 mL    08 Feb 2021 07:01  -  08 Feb 2021 20:47  --------------------------------------------------------  IN: 0 mL / OUT: 700 mL / NET: -700 mL        PHYSICAL EXAM:  Vital Signs Last 24 Hrs  T(C): 36.9 (08 Feb 2021 15:00), Max: 36.9 (08 Feb 2021 15:00)  T(F): 98.5 (08 Feb 2021 15:00), Max: 98.5 (08 Feb 2021 15:00)  HR: 95 (08 Feb 2021 15:00) (83 - 95)  BP: 143/73 (08 Feb 2021 15:00) (138/66 - 154/72)  BP(mean): --  RR: 18 (08 Feb 2021 15:00) (16 - 18)  SpO2: 97% (08 Feb 2021 15:00) (97% - 99%)    awake, alert  lungs diminished air entry bilateral       LABS:                        11.6   12.45 )-----------( 315      ( 07 Feb 2021 11:08 )             39.6     02-08    143  |  109<H>  |  16  ----------------------------<  166<H>  4.0   |  22  |  0.83    Ca    8.6      08 Feb 2021 07:25  Phos  2.9     02-08  Mg     2.0     02-08                  RADIOLOGY & ADDITIONAL TESTS:  Results Reviewed:   Imaging Personally Reviewed:  Electrocardiogram Personally Reviewed:    COORDINATION OF CARE:  Care Discussed with Consultants/Other Providers [Y/N]:  Prior or Outpatient Records Reviewed [Y/N]:

## 2021-02-08 NOTE — PROGRESS NOTE ADULT - ASSESSMENT
70M HTN, CAD s/p TRACEE 2015, T2DM (poorly controlled) p/w fever, cough, diarrhea. Diagnosed w/ COVID w/ superimposed pneumonia. Hypoxic and intubated in ED on Jan 22. Patient extubated 1/31. Sputum culture grew MSSA + H influenzae. Course complicated by AMS due to hypernatremia (sodium 153). Sodium now improved 143 s/p IVF. Speech and swallow consulted due suspected aspiration. Started on dysphagia diet. Endocrine following for uncontrolled diabetes. Patient pending EETLVINA for dispo. Patient now on RA,  improving gradually  . Last day abx 2/9, can be d'cd on orals for d'c.

## 2021-02-08 NOTE — CHART NOTE - NSCHARTNOTEFT_GEN_A_CORE
70 year old man with PMH CAD s/p stents x3 (2015), uncontrolled DM2, HTN, HLD, BPH who presents to the hospital with worsening SOB, admitted with +COVID. Endocrine consult called for management of uncontrolled DM2 in critically ill patient, initially intubated for COVID infection. Pt now extubated on O2 via NC. Pt with inpt course complicated by hypernatremia requiring dextrose, with hyperglycemia as a result     BG, insulin administration and chart reviewed  Noted patient had tightly controlled glucose last night, 70, today with hyperglycemia this afternoon.    patient is tolerating dysphagia diet, had been taken off dextrose IVF (for hypernatremia), currently no active IVF orders.    2/8/2021-no changes for now until we have a 24 hr trend   Recommend to c/w Admelog to 5 units SQ TID before meals (Hold if NPO/not eating meal). I do not want to make a change yet as there is no clear pattern at this time given tightly controlled last night and hyperglcyemia this afternoon  Continue Admelog MODERATE dose correctional scales as currently ordered   Continue Lantus 32 units SQ qHS   Consistent carbohydrate consideration with diet  Check BG before meals and bedtime  See prior endocrine progress notes for complete plan of care including discharge planning  Will follow      inform endocrine of hypoglycemia or persistent hyperglycemia episodes as changes in pts insulin regimen will need to be made.   notify endocrine if any plans to be NPO/diet changes as this will also affect insulin regimen.      MEDICATIONS  (STANDING):  amLODIPine   Tablet 10 milliGRAM(s) Oral daily  aspirin  chewable 81 milliGRAM(s) Oral daily  clopidogrel Tablet 75 milliGRAM(s) Oral daily  dextrose 40% Gel 15 Gram(s) Oral once  dextrose 5%. 1000 milliLiter(s) (50 mL/Hr) IV Continuous <Continuous>  dextrose 5%. 1000 milliLiter(s) (100 mL/Hr) IV Continuous <Continuous>  dextrose 50% Injectable 25 Gram(s) IV Push once  dextrose 50% Injectable 12.5 Gram(s) IV Push once  dorzolamide 2% Ophthalmic Solution 1 Drop(s) Both EYES every 8 hours  enoxaparin Injectable 40 milliGRAM(s) SubCutaneous daily  fenofibrate Tablet 145 milliGRAM(s) Oral daily  glucagon  Injectable 1 milliGRAM(s) IntraMuscular once  insulin glargine Injectable (LANTUS) 32 Unit(s) SubCutaneous at bedtime  insulin lispro (ADMELOG) corrective regimen sliding scale   SubCutaneous three times a day before meals  insulin lispro (ADMELOG) corrective regimen sliding scale   SubCutaneous at bedtime  insulin lispro Injectable (ADMELOG) 5 Unit(s) SubCutaneous three times a day before meals  isosorbide   mononitrate ER Tablet (IMDUR) 60 milliGRAM(s) Oral daily  latanoprost 0.005% Ophthalmic Solution 1 Drop(s) Both EYES at bedtime  losartan 25 milliGRAM(s) Oral daily  piperacillin/tazobactam IVPB.. 3.375 Gram(s) IV Intermittent every 8 hours  simvastatin 20 milliGRAM(s) Oral at bedtime  tamsulosin 0.4 milliGRAM(s) Oral at bedtime          Debbie Gaffney MD  [phone]  Please note that this patient may be followed by a different provider tomorrow. If no answer or after hours, please contact [phone].  For final dc reccomendations, please call [phone] or page the endocrine fellow on call.

## 2021-02-08 NOTE — PROGRESS NOTE ADULT - SUBJECTIVE AND OBJECTIVE BOX
Follow Up:      Inverval History/ROS:Patient is a 70y old  Male who presents with a chief complaint of Acute hypoxic respiratory failure 2' COVID-19 PNA (07 Feb 2021 22:31)    No fever  No events    Allergies    No Known Allergies    Intolerances        ANTIMICROBIALS:  piperacillin/tazobactam IVPB.. 3.375 every 8 hours      OTHER MEDS:  acetaminophen   Tablet .. 650 milliGRAM(s) Oral every 6 hours PRN  amLODIPine   Tablet 10 milliGRAM(s) Oral daily  aspirin  chewable 81 milliGRAM(s) Oral daily  clopidogrel Tablet 75 milliGRAM(s) Oral daily  dextrose 40% Gel 15 Gram(s) Oral once  dextrose 5%. 1000 milliLiter(s) IV Continuous <Continuous>  dextrose 5%. 1000 milliLiter(s) IV Continuous <Continuous>  dextrose 50% Injectable 25 Gram(s) IV Push once  dextrose 50% Injectable 12.5 Gram(s) IV Push once  dorzolamide 2% Ophthalmic Solution 1 Drop(s) Both EYES every 8 hours  enoxaparin Injectable 40 milliGRAM(s) SubCutaneous daily  fenofibrate Tablet 145 milliGRAM(s) Oral daily  glucagon  Injectable 1 milliGRAM(s) IntraMuscular once  hydrALAZINE Injectable 10 milliGRAM(s) IV Push every 6 hours PRN  insulin glargine Injectable (LANTUS) 32 Unit(s) SubCutaneous at bedtime  insulin lispro (ADMELOG) corrective regimen sliding scale   SubCutaneous three times a day before meals  insulin lispro (ADMELOG) corrective regimen sliding scale   SubCutaneous at bedtime  insulin lispro Injectable (ADMELOG) 5 Unit(s) SubCutaneous three times a day before meals  isosorbide   mononitrate ER Tablet (IMDUR) 60 milliGRAM(s) Oral daily  latanoprost 0.005% Ophthalmic Solution 1 Drop(s) Both EYES at bedtime  losartan 25 milliGRAM(s) Oral daily  simvastatin 20 milliGRAM(s) Oral at bedtime  tamsulosin 0.4 milliGRAM(s) Oral at bedtime      Vital Signs Last 24 Hrs  T(C): 36.9 (08 Feb 2021 15:00), Max: 36.9 (08 Feb 2021 15:00)  T(F): 98.5 (08 Feb 2021 15:00), Max: 98.5 (08 Feb 2021 15:00)  HR: 95 (08 Feb 2021 15:00) (83 - 95)  BP: 143/73 (08 Feb 2021 15:00) (138/66 - 154/72)  BP(mean): --  RR: 18 (08 Feb 2021 15:00) (16 - 18)  SpO2: 97% (08 Feb 2021 15:00) (97% - 99%)    PHYSICAL EXAM:  General: x[ ] non-toxic  HEAD/EYES: [ ] PERRL [ x] white sclera [ ] icterus  ENT:  [ ] normal [ x] supple [ ] thrush [ ] pharyngeal exudate  Cardiovascular:   [ ] murmur [x ] normal [ ] PPM/AICD  Respiratory:  [x ] clear to ausculation bilaterally  GI:  [ x] soft, non-tender, normal bowel sounds  :  [ ] vora [x ] no CVA tenderness   Musculoskeletal:  [ ] no synovitis  Neurologic:  [ ] non-focal exam   Skin:  [x ] no rash  Lymph: [ ] no lymphadenopathy  Psychiatric:  [ ] appropriate affect [x ] alert & oriented  Lines:  [x ] no phlebitis [ ] central line                                11.6   12.45 )-----------( 315      ( 07 Feb 2021 11:08 )             39.6       02-08    143  |  109<H>  |  16  ----------------------------<  166<H>  4.0   |  22  |  0.83    Ca    8.6      08 Feb 2021 07:25  Phos  2.9     02-08  Mg     2.0     02-08            MICROBIOLOGY:Culture Results:   No Growth Final (02-03-21 @ 14:46)  Culture Results:   No Growth Final (02-03-21 @ 14:46)      RADIOLOGY:

## 2021-02-08 NOTE — PROGRESS NOTE ADULT - ASSESSMENT
70 M with PMH CAD s/p stents x3 (2015), DM2, BPH who presents to the hospital with worsening SOB  Leukocytosis, fever  CXR with opacities, improving    S/p Remd/Dexa for COVID PNA  Sputum with MSSA  Had course of Unasyn/CTX for H influenzae and MSSA in sputum, stopped 1/31    Appears improving today, improved resp status/mental status  Overall,  1) MSSA PNA/HAP  - Zosyn 3.375g q 8  2) Fever/Leukocytosis  - BCXs- 2/3 without growth  - Monitor  3) COVID  - S/p Remd/Dexa  - Supportive care

## 2021-02-09 ENCOUNTER — TRANSCRIPTION ENCOUNTER (OUTPATIENT)
Age: 71
End: 2021-02-09

## 2021-02-09 LAB
BASOPHILS # BLD AUTO: 0.05 K/UL — SIGNIFICANT CHANGE UP (ref 0–0.2)
BASOPHILS NFR BLD AUTO: 0.4 % — SIGNIFICANT CHANGE UP (ref 0–2)
D DIMER BLD IA.RAPID-MCNC: 822 NG/ML DDU — HIGH
EOSINOPHIL # BLD AUTO: 0.35 K/UL — SIGNIFICANT CHANGE UP (ref 0–0.5)
EOSINOPHIL NFR BLD AUTO: 2.9 % — SIGNIFICANT CHANGE UP (ref 0–6)
GLUCOSE BLDC GLUCOMTR-MCNC: 120 MG/DL — HIGH (ref 70–99)
GLUCOSE BLDC GLUCOMTR-MCNC: 155 MG/DL — HIGH (ref 70–99)
GLUCOSE BLDC GLUCOMTR-MCNC: 158 MG/DL — HIGH (ref 70–99)
GLUCOSE BLDC GLUCOMTR-MCNC: 191 MG/DL — HIGH (ref 70–99)
GLUCOSE BLDC GLUCOMTR-MCNC: 78 MG/DL — SIGNIFICANT CHANGE UP (ref 70–99)
HCT VFR BLD CALC: 35.9 % — LOW (ref 39–50)
HGB BLD-MCNC: 11 G/DL — LOW (ref 13–17)
IANC: 7.85 K/UL — SIGNIFICANT CHANGE UP (ref 1.5–8.5)
IMM GRANULOCYTES NFR BLD AUTO: 1.3 % — SIGNIFICANT CHANGE UP (ref 0–1.5)
LYMPHOCYTES # BLD AUTO: 2.98 K/UL — SIGNIFICANT CHANGE UP (ref 1–3.3)
LYMPHOCYTES # BLD AUTO: 24.6 % — SIGNIFICANT CHANGE UP (ref 13–44)
MCHC RBC-ENTMCNC: 25.9 PG — LOW (ref 27–34)
MCHC RBC-ENTMCNC: 30.6 GM/DL — LOW (ref 32–36)
MCV RBC AUTO: 84.5 FL — SIGNIFICANT CHANGE UP (ref 80–100)
MONOCYTES # BLD AUTO: 0.71 K/UL — SIGNIFICANT CHANGE UP (ref 0–0.9)
MONOCYTES NFR BLD AUTO: 5.9 % — SIGNIFICANT CHANGE UP (ref 2–14)
NEUTROPHILS # BLD AUTO: 7.85 K/UL — HIGH (ref 1.8–7.4)
NEUTROPHILS NFR BLD AUTO: 64.9 % — SIGNIFICANT CHANGE UP (ref 43–77)
NRBC # BLD: 0 /100 WBCS — SIGNIFICANT CHANGE UP
NRBC # FLD: 0 K/UL — SIGNIFICANT CHANGE UP
PLATELET # BLD AUTO: 277 K/UL — SIGNIFICANT CHANGE UP (ref 150–400)
RBC # BLD: 4.25 M/UL — SIGNIFICANT CHANGE UP (ref 4.2–5.8)
RBC # FLD: 14.4 % — SIGNIFICANT CHANGE UP (ref 10.3–14.5)
WBC # BLD: 12.1 K/UL — HIGH (ref 3.8–10.5)
WBC # FLD AUTO: 12.1 K/UL — HIGH (ref 3.8–10.5)

## 2021-02-09 PROCEDURE — 99232 SBSQ HOSP IP/OBS MODERATE 35: CPT

## 2021-02-09 RX ORDER — LOSARTAN POTASSIUM 100 MG/1
50 TABLET, FILM COATED ORAL DAILY
Refills: 0 | Status: DISCONTINUED | OUTPATIENT
Start: 2021-02-09 | End: 2021-02-10

## 2021-02-09 RX ORDER — PIPERACILLIN AND TAZOBACTAM 4; .5 G/20ML; G/20ML
3.38 INJECTION, POWDER, LYOPHILIZED, FOR SOLUTION INTRAVENOUS ONCE
Refills: 0 | Status: COMPLETED | OUTPATIENT
Start: 2021-02-09 | End: 2021-02-09

## 2021-02-09 RX ADMIN — ISOSORBIDE MONONITRATE 60 MILLIGRAM(S): 60 TABLET, EXTENDED RELEASE ORAL at 12:36

## 2021-02-09 RX ADMIN — DORZOLAMIDE HYDROCHLORIDE 1 DROP(S): 20 SOLUTION/ DROPS OPHTHALMIC at 13:34

## 2021-02-09 RX ADMIN — Medication 5 UNIT(S): at 13:34

## 2021-02-09 RX ADMIN — Medication 2: at 13:33

## 2021-02-09 RX ADMIN — PIPERACILLIN AND TAZOBACTAM 25 GRAM(S): 4; .5 INJECTION, POWDER, LYOPHILIZED, FOR SOLUTION INTRAVENOUS at 06:54

## 2021-02-09 RX ADMIN — Medication 5 UNIT(S): at 09:54

## 2021-02-09 RX ADMIN — DORZOLAMIDE HYDROCHLORIDE 1 DROP(S): 20 SOLUTION/ DROPS OPHTHALMIC at 22:49

## 2021-02-09 RX ADMIN — LOSARTAN POTASSIUM 25 MILLIGRAM(S): 100 TABLET, FILM COATED ORAL at 06:54

## 2021-02-09 RX ADMIN — CLOPIDOGREL BISULFATE 75 MILLIGRAM(S): 75 TABLET, FILM COATED ORAL at 12:37

## 2021-02-09 RX ADMIN — LATANOPROST 1 DROP(S): 0.05 SOLUTION/ DROPS OPHTHALMIC; TOPICAL at 22:49

## 2021-02-09 RX ADMIN — Medication 145 MILLIGRAM(S): at 12:37

## 2021-02-09 RX ADMIN — PIPERACILLIN AND TAZOBACTAM 25 GRAM(S): 4; .5 INJECTION, POWDER, LYOPHILIZED, FOR SOLUTION INTRAVENOUS at 14:50

## 2021-02-09 RX ADMIN — SIMVASTATIN 20 MILLIGRAM(S): 20 TABLET, FILM COATED ORAL at 22:49

## 2021-02-09 RX ADMIN — INSULIN GLARGINE 32 UNIT(S): 100 INJECTION, SOLUTION SUBCUTANEOUS at 22:49

## 2021-02-09 RX ADMIN — Medication 81 MILLIGRAM(S): at 12:36

## 2021-02-09 RX ADMIN — ENOXAPARIN SODIUM 40 MILLIGRAM(S): 100 INJECTION SUBCUTANEOUS at 12:36

## 2021-02-09 RX ADMIN — PIPERACILLIN AND TAZOBACTAM 25 GRAM(S): 4; .5 INJECTION, POWDER, LYOPHILIZED, FOR SOLUTION INTRAVENOUS at 22:49

## 2021-02-09 RX ADMIN — DORZOLAMIDE HYDROCHLORIDE 1 DROP(S): 20 SOLUTION/ DROPS OPHTHALMIC at 06:55

## 2021-02-09 RX ADMIN — LOSARTAN POTASSIUM 50 MILLIGRAM(S): 100 TABLET, FILM COATED ORAL at 17:53

## 2021-02-09 RX ADMIN — TAMSULOSIN HYDROCHLORIDE 0.4 MILLIGRAM(S): 0.4 CAPSULE ORAL at 22:48

## 2021-02-09 RX ADMIN — AMLODIPINE BESYLATE 10 MILLIGRAM(S): 2.5 TABLET ORAL at 06:54

## 2021-02-09 RX ADMIN — Medication 2: at 09:53

## 2021-02-09 RX ADMIN — Medication 5 UNIT(S): at 17:51

## 2021-02-09 NOTE — PROGRESS NOTE ADULT - SUBJECTIVE AND OBJECTIVE BOX
Chief Complaint: DM 2, COVID    History: Patient seen at bedside. Patient reports he is ok, eating meals, denies n/v, denies s/s of hypoglycemia  Most recent  mg/dl   Not on steroids     MEDICATIONS  (STANDING):  amLODIPine   Tablet 10 milliGRAM(s) Oral daily  aspirin  chewable 81 milliGRAM(s) Oral daily  clopidogrel Tablet 75 milliGRAM(s) Oral daily  dextrose 40% Gel 15 Gram(s) Oral once  dextrose 5%. 1000 milliLiter(s) (50 mL/Hr) IV Continuous <Continuous>  dextrose 5%. 1000 milliLiter(s) (100 mL/Hr) IV Continuous <Continuous>  dextrose 50% Injectable 25 Gram(s) IV Push once  dextrose 50% Injectable 12.5 Gram(s) IV Push once  dorzolamide 2% Ophthalmic Solution 1 Drop(s) Both EYES every 8 hours  enoxaparin Injectable 40 milliGRAM(s) SubCutaneous daily  fenofibrate Tablet 145 milliGRAM(s) Oral daily  glucagon  Injectable 1 milliGRAM(s) IntraMuscular once  insulin glargine Injectable (LANTUS) 32 Unit(s) SubCutaneous at bedtime  insulin lispro (ADMELOG) corrective regimen sliding scale   SubCutaneous three times a day before meals  insulin lispro (ADMELOG) corrective regimen sliding scale   SubCutaneous at bedtime  insulin lispro Injectable (ADMELOG) 5 Unit(s) SubCutaneous three times a day before meals  isosorbide   mononitrate ER Tablet (IMDUR) 60 milliGRAM(s) Oral daily  latanoprost 0.005% Ophthalmic Solution 1 Drop(s) Both EYES at bedtime  losartan 50 milliGRAM(s) Oral daily  simvastatin 20 milliGRAM(s) Oral at bedtime  tamsulosin 0.4 milliGRAM(s) Oral at bedtime    MEDICATIONS  (PRN):  acetaminophen   Tablet .. 650 milliGRAM(s) Oral every 6 hours PRN Temp greater or equal to 38C (100.4F), Mild Pain (1 - 3), Moderate Pain (4 - 6), Severe Pain (7 - 10)  hydrALAZINE Injectable 10 milliGRAM(s) IV Push every 6 hours PRN SBP >180    No Known Allergies    Review of Systems:  Cardiovascular: No chest pain  Respiratory: No SOB  GI: No nausea, vomiting  Endocrine: no hypoglycemia symptoms     PHYSICAL EXAM:  VITALS: T(C): 36.5 (02-09-21 @ 09:56)  T(F): 97.7 (02-09-21 @ 09:56), Max: 97.7 (02-09-21 @ 09:56)  HR: 94 (02-09-21 @ 12:38) (89 - 94)  BP: 129/75 (02-09-21 @ 12:38) (129/75 - 150/88)  RR:  (16 - 18)  SpO2:  (96% - 98%)  Wt(kg): --  GENERAL: NAD  EYES: No proptosis, no lid lag, anicteric  HEENT:  Atraumatic, Normocephalic, moist mucous membranes  RESPIRATORY: unlabored respirations     CAPILLARY BLOOD GLUCOSE    POCT Blood Glucose.: 191 mg/dL (09 Feb 2021 13:22)  POCT Blood Glucose.: 158 mg/dL (09 Feb 2021 09:49)  POCT Blood Glucose.: 172 mg/dL (08 Feb 2021 21:16)  POCT Blood Glucose.: 187 mg/dL (08 Feb 2021 18:15)      02-08    143  |  109<H>  |  16  ----------------------------<  166<H>  4.0   |  22  |  0.83    EGFR if : 102  EGFR if non : 88    Ca    8.6      02-08  Mg     2.0     02-08  Phos  2.9     02-08    Thyroid Function Tests:      A1C with Estimated Average Glucose Result: 9.6 % (01-22-21 @ 13:19)    Diet, Dysphagia 1 Pureed-Nectar Consistency Fluid (02-07-21 @ 11:56)

## 2021-02-09 NOTE — PROGRESS NOTE ADULT - PROBLEM SELECTOR PLAN 2
monitor bp
- c/w statin and fibrate   - check lipids as outpatient.
monitor bp
- c/w statin and fibrate   - check lipids as outpatient.
monitor bp, not at goal; added losartan 25mg for BP control  uptitrate BP regimen as tolerated  #CAD  - c/w home asa/plavix statin
- c/w statin and fibrate   - check lipids as outpatient.
- c/w statin and fibrate   - check lipids as outpatient.
monitor bp
monitor bp
monitor bp, if remains above goal tomorrow will uptitrate BP regimen
monitor bp, not at goal; added losartan 25mg for BP control  uptitrate BP regimen as tolerated  #CAD  - c/w home asa/plavix statin
monitor bp
monitor bp, not at goal; added losartan 50mg for BP control  up titrate BP regimen as tolerated  #CAD  - c/w home asa/plavix statin

## 2021-02-09 NOTE — PROGRESS NOTE ADULT - ASSESSMENT
70 M with PMH CAD s/p stents x3 (2015), DM2, BPH who presents to the hospital with worsening SOB  Leukocytosis, fever  CXR with opacities  Patient on RA  S/p Remd/Dexa for COVID PNA  Sputum with MSSA  Had course of Unasyn/CTX for H influenzae and MSSA in sputum, stopped 1/31  Appears improved  Overall,  1) MSSA PNA/HAP  - Worsening resp status, signs sepsis; patient ill appearing  - Zosyn 3.375g q 8, stop after 7 days  - O2 supplementation per primary team  - If not improving, check CT Chest, CT A/P to evaluate for occult source  2) Fever/Leukocytosis  - F/U BCXs x 2  - Monitor for possible alternate source  - Trend to normal  3) COVID  - S/p Remd/Dexa  - Supportive care    Bhupendra Yun MD  Pager [phone]  After 5pm and on weekends call [phone]

## 2021-02-09 NOTE — DISCHARGE NOTE PROVIDER - NSDCMRMEDTOKEN_GEN_ALL_CORE_FT
amLODIPine 2.5 mg oral tablet: 1 tab(s) orally once a day  Basaglar KwikPen 100 units/mL subcutaneous solution: 68 unit(s) subcutaneous once a day (at bedtime)  fenofibrate 145 mg oral tablet: 1 tab(s) orally once a day  Flomax 0.4 mg oral capsule: 1 cap(s) orally once a day  gabapentin 600 mg oral tablet: 1 tab(s) orally 2 times a day  Invokamet XR 50 mg-1000 mg oral tablet, extended release: 1 tab(s) orally once a day (in the morning)  Januvia 100 mg oral tablet: 1 tab(s) orally once a day  Lasix 20 mg oral tablet: 1 tab(s) orally once a day  metoprolol succinate 100 mg oral tablet, extended release: 1 tab(s) orally once a day  NovoLOG FlexPen 100 units/mL injectable solution: 15 unit(s) injectable 3 times a day (before meals)  omeprazole 40 mg oral delayed release capsule: 1 cap(s) orally once a day  Plavix 75 mg oral tablet: 1 tab(s) orally once a day  simvastatin 40 mg oral tablet: 1 tab(s) orally once a day (at bedtime)   amLODIPine 2.5 mg oral tablet: 1 tab(s) orally once a day  aspirin 81 mg oral tablet, chewable: 1 tab(s) orally once a day  dorzolamide 2% ophthalmic solution: 1 drop(s) to each affected eye every 8 hours  fenofibrate 145 mg oral tablet: 1 tab(s) orally once a day  Flomax 0.4 mg oral capsule: 1 cap(s) orally once a day  gabapentin 600 mg oral tablet: 1 tab(s) orally 2 times a day  insulin glargine: 32 unit(s) subcutaneous once a day (at bedtime)  insulin lispro 100 units/mL injectable solution: 1 unit(s) injectable 4 times a day (before meals and at bedtime)   isosorbide mononitrate 60 mg oral tablet, extended release: 1 tab(s) orally once a day  Lasix 20 mg oral tablet: 1 tab(s) orally once a day  latanoprost 0.005% ophthalmic solution: 1 drop(s) to each affected eye once a day (at bedtime)  levoFLOXacin 750 mg oral tablet: 1 tab(s) orally every 24 hours  losartan 50 mg oral tablet: 1 tab(s) orally once a day  metoprolol succinate 100 mg oral tablet, extended release: 1 tab(s) orally once a day  NovoLOG FlexPen 100 units/mL injectable solution: 4 unit(s) injectable 3 times a day (before meals)   omeprazole 40 mg oral delayed release capsule: 1 cap(s) orally once a day  Plavix 75 mg oral tablet: 1 tab(s) orally once a day  rivaroxaban 10 mg oral tablet: 1 tab(s) orally once a day   simvastatin 40 mg oral tablet: 1 tab(s) orally once a day (at bedtime)

## 2021-02-09 NOTE — PROGRESS NOTE ADULT - PROBLEM SELECTOR PROBLEM 4
Uncontrolled type 2 diabetes mellitus with hyperglycemia

## 2021-02-09 NOTE — DISCHARGE NOTE PROVIDER - HOSPITAL COURSE
70M HTN, CAD s/p TRACEE 2015, T2DM (poorly controlled) p/w fever, cough, diarrhea. Diagnosed w/ COVID w/ superimposed pneumonia. Hypoxic and intubated in ED on Jan 22. Patient extubated 1/31. Sputum culture grew MSSA + H influenzae. Course complicated by AMS due to hypernatremia (sodium 153). Sodium now improved 143 s/p IVF. Speech and swallow consulted due suspected aspiration. Started on dysphagia diet. Endocrine following for uncontrolled diabetes. Patient pending ETELVINA for dispo. Patient now on RA,  improving gradually  . Last day abx 2/9, can be d'cd on orals for d'c.        On_________, discussed with __________, patient is medically cleared and optimized for discharge today. All medications were reviewed with attending, and sent to mutually agreed upon pharmacy.    70M HTN, CAD s/p TRACEE 2015, T2DM (poorly controlled) p/w fever, cough, diarrhea. Diagnosed w/ COVID w/ superimposed pneumonia. Hypoxic and intubated in ED on Jan 22. Patient extubated 1/31. Sputum culture grew MSSA + H influenzae. Course complicated by AMS due to hypernatremia (sodium 153). Sodium now improved 137 s/p IVF. Speech and swallow consulted due suspected aspiration. Started on dysphagia diet. Endocrine consulted for uncontrolled DM, recs appreciated. Patient weaned off supplemental oxygen, now on RA, and satting 99%.Patient will continue on PO Levaquin x 3 days to complete treatment for possible aspiration PNA. Losartan 50mg added to HTN regimen. Patient  to discharged to [facility] rehab facility until optimized for home.         On___2/10/21______, discussed with ____Kevyn______, patient is medically cleared and optimized for discharge today. All medications were reviewed with attending, and sent to mutually agreed upon pharmacy.    70M HTN, CAD s/p TRACEE 2015, T2DM (poorly controlled) p/w fever, cough, diarrhea. Diagnosed w/ COVID w/ superimposed pneumonia. Hypoxic  respiratory failure and intubated in ED on Jan 22. Patient extubated 1/31. Sputum culture grew MSSA + H influenzae. Course complicated by AMS due to hypernatremia (sodium 153) metabolic encephalopathy due to dysphagia and protein calorie malnutrition . Sodium now improved 137 s/p IVF. Speech and swallow consulted due suspected aspiration. Started on dysphagia diet. Endocrine consulted for uncontrolled DM, recs appreciated. Patient weaned off supplemental oxygen, now on RA, and satting 99%.  failed unasyn and developed sepsis aspiration pneumonia , treated with 7 days IV zosyn. remains afebrile, leucocytosis trending down,  doing well and improving, overall functional status below baseline , needs PT for mobility and balance training .   Patient will continue on PO Levaquin x 3 days to complete treatment for possible aspiration PNA. Losartan 50mg added to HTN regimen. Patient  to discharged to [facility] rehab facility until optimized for home.       On 2/10/21 discussed with Dr Bagley patient is medically cleared and optimized for discharge today. All medications were reviewed with attending, and sent to mutually agreed upon pharmacy.     patient seen and examined by me bedside,   lungs diminished air entry bilateral , awake alert oriented x2    total time spent discharging patient  greater than 30 mins

## 2021-02-09 NOTE — PROGRESS NOTE ADULT - PROBLEM SELECTOR PROBLEM 2
Essential hypertension
Hyperlipidemia, unspecified hyperlipidemia type
Essential hypertension
Hyperlipidemia, unspecified hyperlipidemia type
Essential hypertension
Essential hypertension
Hyperlipidemia, unspecified hyperlipidemia type
Hyperlipidemia, unspecified hyperlipidemia type
Essential hypertension

## 2021-02-09 NOTE — PROGRESS NOTE ADULT - PROBLEM SELECTOR PROBLEM 1
Uncontrolled type 2 diabetes mellitus with hyperglycemia
Uncontrolled type 2 diabetes mellitus with hyperglycemia
Acute respiratory failure with hypoxia
Uncontrolled type 2 diabetes mellitus with hyperglycemia
Acute respiratory failure with hypoxia
Uncontrolled type 2 diabetes mellitus with hyperglycemia
Acute respiratory failure with hypoxia
Uncontrolled type 2 diabetes mellitus with hyperglycemia
Uncontrolled type 2 diabetes mellitus with hyperglycemia
Acute respiratory failure with hypoxia
Acute respiratory failure with hypoxia
Uncontrolled type 2 diabetes mellitus with hyperglycemia
Acute respiratory failure with hypoxia
Acute respiratory failure with hypoxia

## 2021-02-09 NOTE — PROGRESS NOTE ADULT - ASSESSMENT
70 year old man with PMH CAD s/p stents x3 (2015), uncontrolled DM2, HTN, HLD, BPH who presents to the hospital with worsening SOB, admitted with +COVID. Endocrine consult called for management of uncontrolled DM2 in critically ill patient, initially intubated for COVID infection. Pt now extubated on O2 via NC. Pt with inpt course complicated by hypernatremia requiring dextrose, with hyperglycemia as a result     1. Uncontrolled type 2 diabetes mellitus with hyperglycemia.    -A1c 9.6%  -Inpatient BG target 100-180 mg/dl   -On dysphagia diet, tolerating  -Continue Lantus 32 units SQ qHS   -Continue Admelog 5 units SQ TID before meals (Hold if NPO/not eating meal)   -Continue Admelog MODERATE dose correctional scale before meals and bedtime  -Consistent carbohydrate diet consideration  -Check BG before meals and bedtime   -Keep hypoglycemia protocol active     Discharge Plan:  -Was on basal/bolus pens prior to admit (Novolog and Basaglar) plus 3 oral agents  -Anticipate resuming basal/bolus alone with dose TBD at discharge  -Will follow     2. Hyperlipidemia, unspecified hyperlipidemia type.    -Continue statin and fibrate   -Check lipids as outpatient.     3. Essential hypertension  -Mgmt per primary team.     Luz Sheehan  Nurse Practitioner  Division of Endocrinology & Diabetes  In house pager #[phone]/long range pager #[phone]    If before 9AM or after 6PM, or on weekends/holidays, please call endocrine answering service for assistance ([phone]).  For nonurgent matters email [email or web address] for assistance.

## 2021-02-09 NOTE — PROGRESS NOTE ADULT - PROBLEM SELECTOR PROBLEM 3
BPH (benign prostatic hyperplasia)
Essential hypertension
BPH (benign prostatic hyperplasia)
BPH (benign prostatic hyperplasia)
Essential hypertension
BPH (benign prostatic hyperplasia)
Essential hypertension
BPH (benign prostatic hyperplasia)
BPH (benign prostatic hyperplasia)
Essential hypertension
BPH (benign prostatic hyperplasia)
Essential hypertension
BPH (benign prostatic hyperplasia)
BPH (benign prostatic hyperplasia)

## 2021-02-09 NOTE — PROGRESS NOTE ADULT - ASSESSMENT
70M HTN, CAD s/p TRACEE 2015, T2DM (poorly controlled) p/w fever, cough, diarrhea. Diagnosed w/ COVID w/ superimposed pneumonia. Hypoxic and intubated in ED on Jan 22. Patient extubated 1/31. Sputum culture grew MSSA + H influenzae. Course complicated by AMS due to hypernatremia (sodium 153). Sodium now improved 143 s/p IVF. Speech and swallow consulted due suspected aspiration. Started on dysphagia diet. Endocrine following for uncontrolled diabetes. Patient pending ETELVINA for dispo. Patient now on RA,  improving gradually  . Last day abx 2/9, can be d'cd on orals for d'c.

## 2021-02-09 NOTE — PROGRESS NOTE ADULT - SUBJECTIVE AND OBJECTIVE BOX
CC: F/U for PNA    Saw/spoke to patient. No fevers, no chills. No new complaints. Unchanged. Improved.    Allergies  No Known Allergies    ANTIMICROBIALS:  piperacillin/tazobactam IVPB.. 3.375 every 8 hours    PE:    Vital Signs Last 24 Hrs  T(C): 36.5 (09 Feb 2021 09:56), Max: 36.9 (08 Feb 2021 15:00)  T(F): 97.7 (09 Feb 2021 09:56), Max: 98.5 (08 Feb 2021 15:00)  HR: 90 (09 Feb 2021 09:56) (89 - 95)  BP: 139/74 (09 Feb 2021 09:56) (135/74 - 150/88)  RR: 18 (09 Feb 2021 09:56) (16 - 18)  SpO2: 97% (09 Feb 2021 09:56) (96% - 98%)    Gen: AOx3, NAD, non-toxic, pleasant  CV: S1+S2 normal, nontachycardic  Resp: Clear bilat, no resp distress, no crackles/wheezes, RA  Abd: Soft, nontender, +BS  Ext: No LE edema, no wounds    LABS:                        11.0   12.10 )-----------( 277      ( 09 Feb 2021 07:23 )             35.9     02-08    143  |  109<H>  |  16  ----------------------------<  166<H>  4.0   |  22  |  0.83    Ca    8.6      08 Feb 2021 07:25  Phos  2.9     02-08  Mg     2.0     02-08    MICROBIOLOGY:    .Blood Blood-Peripheral  02-03-21   No Growth Final    .Sputum Sputum trap  01-27-21   Numerous Staphylococcus aureus  Normal Respiratory Susan absent  --  Staphylococcus aureus    .Sputum Sputum  01-22-21   Moderate Staphylococcus aureus  Moderate Haemophilus influenzae "Susceptibilities not performed"  Normal Respiratory Susan present  --  Staphylococcus aureus    (otherwise reviewed)    RADIOLOGY:    2/2 XR:    Impression:improved LEFT lung alveolar infiltrates with minimal residual in the LEFT lower lobe as well as small infiltrates in the peripheral RIGHT lung.

## 2021-02-09 NOTE — PROGRESS NOTE ADULT - REASON FOR ADMISSION
Acute hypoxic respiratory failure 2' COVID-19 PNA
Acute hypoxic respiratory failure 2/2 COVID-19 PNA
Acute hypoxic respiratory failure 2' COVID-19 PNA

## 2021-02-09 NOTE — PROGRESS NOTE ADULT - SUBJECTIVE AND OBJECTIVE BOX
PROGRESS NOTE:     Patient is a 70y old  Male who presents with a chief complaint of Acute hypoxic respiratory failure 2' COVID-19 PNA (08 Feb 2021 20:47)      SUBJECTIVE / OVERNIGHT EVENTS:    ADDITIONAL REVIEW OF SYSTEMS:    MEDICATIONS  (STANDING):  amLODIPine   Tablet 10 milliGRAM(s) Oral daily  aspirin  chewable 81 milliGRAM(s) Oral daily  clopidogrel Tablet 75 milliGRAM(s) Oral daily  dextrose 40% Gel 15 Gram(s) Oral once  dextrose 5%. 1000 milliLiter(s) (50 mL/Hr) IV Continuous <Continuous>  dextrose 5%. 1000 milliLiter(s) (100 mL/Hr) IV Continuous <Continuous>  dextrose 50% Injectable 25 Gram(s) IV Push once  dextrose 50% Injectable 12.5 Gram(s) IV Push once  dorzolamide 2% Ophthalmic Solution 1 Drop(s) Both EYES every 8 hours  enoxaparin Injectable 40 milliGRAM(s) SubCutaneous daily  fenofibrate Tablet 145 milliGRAM(s) Oral daily  glucagon  Injectable 1 milliGRAM(s) IntraMuscular once  insulin glargine Injectable (LANTUS) 32 Unit(s) SubCutaneous at bedtime  insulin lispro (ADMELOG) corrective regimen sliding scale   SubCutaneous three times a day before meals  insulin lispro (ADMELOG) corrective regimen sliding scale   SubCutaneous at bedtime  insulin lispro Injectable (ADMELOG) 5 Unit(s) SubCutaneous three times a day before meals  isosorbide   mononitrate ER Tablet (IMDUR) 60 milliGRAM(s) Oral daily  latanoprost 0.005% Ophthalmic Solution 1 Drop(s) Both EYES at bedtime  losartan 50 milliGRAM(s) Oral daily  piperacillin/tazobactam IVPB.. 3.375 Gram(s) IV Intermittent every 8 hours  simvastatin 20 milliGRAM(s) Oral at bedtime  tamsulosin 0.4 milliGRAM(s) Oral at bedtime    MEDICATIONS  (PRN):  acetaminophen   Tablet .. 650 milliGRAM(s) Oral every 6 hours PRN Temp greater or equal to 38C (100.4F), Mild Pain (1 - 3), Moderate Pain (4 - 6), Severe Pain (7 - 10)  hydrALAZINE Injectable 10 milliGRAM(s) IV Push every 6 hours PRN SBP >180      CAPILLARY BLOOD GLUCOSE      POCT Blood Glucose.: 191 mg/dL (09 Feb 2021 13:22)  POCT Blood Glucose.: 158 mg/dL (09 Feb 2021 09:49)  POCT Blood Glucose.: 172 mg/dL (08 Feb 2021 21:16)  POCT Blood Glucose.: 187 mg/dL (08 Feb 2021 18:15)    I&O's Summary    08 Feb 2021 07:01  -  09 Feb 2021 07:00  --------------------------------------------------------  IN: 0 mL / OUT: 700 mL / NET: -700 mL        PHYSICAL EXAM:  Vital Signs Last 24 Hrs  T(C): 36.5 (09 Feb 2021 09:56), Max: 36.9 (08 Feb 2021 15:00)  T(F): 97.7 (09 Feb 2021 09:56), Max: 98.5 (08 Feb 2021 15:00)  HR: 94 (09 Feb 2021 12:38) (89 - 95)  BP: 129/75 (09 Feb 2021 12:38) (129/75 - 150/88)  BP(mean): --  RR: 18 (09 Feb 2021 12:38) (16 - 18)  SpO2: 98% (09 Feb 2021 12:38) (96% - 98%)    awake alert  abdomen soft nontender      LABS:                        11.0   12.10 )-----------( 277      ( 09 Feb 2021 07:23 )             35.9     02-08    143  |  109<H>  |  16  ----------------------------<  166<H>  4.0   |  22  |  0.83    Ca    8.6      08 Feb 2021 07:25  Phos  2.9     02-08  Mg     2.0     02-08      RADIOLOGY & ADDITIONAL TESTS:  Results Reviewed: y  Imaging Personally Reviewed:y  Electrocardiogram Personally Reviewed:y    COORDINATION OF CARE:  Care Discussed with Consultants/Other Providers [Y/N]:y  Prior or Outpatient Records Reviewed [Y/N]:y

## 2021-02-09 NOTE — DISCHARGE NOTE PROVIDER - NSDCCPCAREPLAN_GEN_ALL_CORE_FT
PRINCIPAL DISCHARGE DIAGNOSIS  Diagnosis: Acute respiratory disease due to severe acute respiratory syndrome coronavirus 2 (SARS-CoV-2)  Assessment and Plan of Treatment: You have been diagnosed with the COVID-19 virus during your hospital stay on 1/22/2021.  Monitor for fevers, shortness of breath and cough primarily.  Monitor your temperature daily to not any changes and increases.    It has been determined that you no longer need hospitalization and can recover while remaining in self-quarantine at home. You should follow the prevention steps below until a healthcare provider or local or state health department says you can return to your normal activities.  1. You should restrict activities outside your home, except for getting medical care.  2. Do not go to work, school, or public areas.  3. Avoid using public transportation, ride-sharing, or taxis.  4. Separate yourself from other people and animals in your home.  5. Call ahead before visiting your doctor.  6. Wear a facemask.  7. Cover your coughs and sneezes.  8. Clean your hands often.  9. Avoid sharing personal household items.  10. Clean all “high-touch” surfaces everyday.  11. Monitor your symptoms.  If you have a medical emergency and need to call 911, notify the dispatch personnel that you have COVID-19 If possible, put on a facemask before emergency medical services arrive.  12. Stopping home isolation.  Patients with confirmed COVID-19 should remain under home isolation precautions for 14 days since the positive COVID-19 test and until the risk of secondary transmission to others is thought to be low. The decision to discontinue home isolation precautions should be made on a case-by-case basis, in consultation with healthcare providers and [facility] and local health departments. [facility] Department of Health can be reached at [phone] for further information about COVID-19.      SECONDARY DISCHARGE DIAGNOSES  Diagnosis: Essential hypertension  Assessment and Plan of Treatment: Continue current blood pressure medication regimen as directed. Monitor for any visual changes, headaches or dizziness.  Monitor blood pressure regularly.  Follow up with your PCP for further management for high blood pressure, please call to make appointment within 1 week of discharge    Diagnosis: Uncontrolled type 2 diabetes mellitus with hyperglycemia  Assessment and Plan of Treatment: Continue consistent carbohydrate diet.  Monitor blood glucose levels throughout the day before meals and at bedtime.  Record blood sugars and bring to outpatient providers appointment in order to be reviewed by your doctor for management modifications.  Be aware of diabetes management symptoms including feeling cool and clammy may be related to low glucose levels.  Feeling hot and dry may indicate high glucose levels.  If  you feel these symptoms, check your blood sugar.  Make regular podiatry appointments in order to have feet checked for wounds and toe nails cut by a doctor to prevent infections.    Diagnosis: CKD stage 3 secondary to diabetes  Assessment and Plan of Treatment: CKD stage 3 secondary to diabetes    Diagnosis: CAD (coronary artery disease)  Assessment and Plan of Treatment: CAD (coronary artery disease)     PRINCIPAL DISCHARGE DIAGNOSIS  Diagnosis: Acute respiratory disease due to severe acute respiratory syndrome coronavirus 2 (SARS-CoV-2)  Assessment and Plan of Treatment: Begin taking Xarelto 10 mg once daily by mouth for 30 days to prevent clot formation.   Continue taking Levaquin 750mg once daily by mouth for the next 3 days to complete the course to treat empirically for superimposed aspiration pneumonia.   You have been diagnosed with the COVID-19 virus during your hospital stay on 1/22/2021.  Monitor for fevers, shortness of breath and cough primarily.  Monitor your temperature daily to not any changes and increases.    It has been determined that you no longer need hospitalization and can recover while remaining in self-quarantine at home. You should follow the prevention steps below until a healthcare provider or local or state health department says you can return to your normal activities.  1. You should restrict activities outside your home, except for getting medical care.  2. Do not go to work, school, or public areas.  3. Avoid using public transportation, ride-sharing, or taxis.  4. Separate yourself from other people and animals in your home.  5. Call ahead before visiting your doctor.  6. Wear a facemask.  7. Cover your coughs and sneezes.  8. Clean your hands often.  9. Avoid sharing personal household items.  10. Clean all “high-touch” surfaces everyday.  11. Monitor your symptoms.  If you have a medical emergency and need to call 911, notify the dispatch personnel that you have COVID-19 If possible, put on a facemask before emergency medical services arrive.  12. Stopping home isolation.  Patients with confirmed COVID-19 should remain under home isolation precautions for 14 days since the positive COVID-19 test and until the risk of secondary transmission to others is thought to be low. The decision to discontinue home isolation precautions should be made on a case-by-case basis, in consultation with healthcare providers and state and local health departments. [facility] Department of Health can be reached at [phone] for further information about COVID-19.      SECONDARY DISCHARGE DIAGNOSES  Diagnosis: CKD stage 3 secondary to diabetes  Assessment and Plan of Treatment: CKD stage 3 secondary to diabetes    Diagnosis: Uncontrolled type 2 diabetes mellitus with hyperglycemia  Assessment and Plan of Treatment: Stop taking Invokana-met and Januvia. Your insulin regimen has been changed as follows: take Lantus 32units at bedtime, Novolog 4 units three times daily before meals, and coverage insulin based on your fingerstick glucose levels based on sliding scale.  Continue consistent carbohydrate diet.  Monitor blood glucose levels throughout the day before meals and at bedtime.  Record blood sugars and bring to outpatient providers appointment in order to be reviewed by your doctor for management modifications.  Be aware of diabetes management symptoms including feeling cool and clammy may be related to low glucose levels.  Feeling hot and dry may indicate high glucose levels.  If  you feel these symptoms, check your blood sugar.  Make regular podiatry appointments in order to have feet checked for wounds and toe nails cut by a doctor to prevent infections.      Diagnosis: CAD (coronary artery disease)  Assessment and Plan of Treatment: CAD (coronary artery disease)      Diagnosis: Essential hypertension  Assessment and Plan of Treatment: Begin taking Losartan 50 mg daily in addition to your home antihypertensive medications.   Continue current blood pressure medication regimen as directed. Monitor for any visual changes, headaches or dizziness.  Monitor blood pressure regularly.  Follow up with your PCP for further management for high blood pressure, please call to make appointment within 1 week of discharge.

## 2021-02-09 NOTE — PROGRESS NOTE ADULT - PROBLEM SELECTOR PLAN 4
monitor glucose  insulin adjusted per endocrinology
monitor glucose
monitor glucose  insulin adjusted per endocrinology

## 2021-02-09 NOTE — PROGRESS NOTE ADULT - PROBLEM SELECTOR PLAN 5
patient medically stable for d'c, pending ETELVINA, on d'c can transition to oral abx (levaquin), last day abx 2/9

## 2021-02-10 ENCOUNTER — TRANSCRIPTION ENCOUNTER (OUTPATIENT)
Age: 71
End: 2021-02-10

## 2021-02-10 VITALS
SYSTOLIC BLOOD PRESSURE: 119 MMHG | OXYGEN SATURATION: 98 % | RESPIRATION RATE: 18 BRPM | HEART RATE: 90 BPM | DIASTOLIC BLOOD PRESSURE: 67 MMHG

## 2021-02-10 LAB
ALBUMIN SERPL ELPH-MCNC: 3.1 G/DL — LOW (ref 3.3–5)
ALP SERPL-CCNC: 54 U/L — SIGNIFICANT CHANGE UP (ref 40–120)
ALT FLD-CCNC: 60 U/L — HIGH (ref 4–41)
ANION GAP SERPL CALC-SCNC: 12 MMOL/L — SIGNIFICANT CHANGE UP (ref 7–14)
AST SERPL-CCNC: 63 U/L — HIGH (ref 4–40)
BASOPHILS # BLD AUTO: 0 K/UL — SIGNIFICANT CHANGE UP (ref 0–0.2)
BASOPHILS NFR BLD AUTO: 0 % — SIGNIFICANT CHANGE UP (ref 0–2)
BILIRUB SERPL-MCNC: 0.4 MG/DL — SIGNIFICANT CHANGE UP (ref 0.2–1.2)
BUN SERPL-MCNC: 14 MG/DL — SIGNIFICANT CHANGE UP (ref 7–23)
CALCIUM SERPL-MCNC: 8.9 MG/DL — SIGNIFICANT CHANGE UP (ref 8.4–10.5)
CHLORIDE SERPL-SCNC: 104 MMOL/L — SIGNIFICANT CHANGE UP (ref 98–107)
CO2 SERPL-SCNC: 21 MMOL/L — LOW (ref 22–31)
CREAT SERPL-MCNC: 0.81 MG/DL — SIGNIFICANT CHANGE UP (ref 0.5–1.3)
EOSINOPHIL # BLD AUTO: 0.74 K/UL — HIGH (ref 0–0.5)
EOSINOPHIL NFR BLD AUTO: 6.5 % — HIGH (ref 0–6)
GLUCOSE BLDC GLUCOMTR-MCNC: 174 MG/DL — HIGH (ref 70–99)
GLUCOSE BLDC GLUCOMTR-MCNC: 196 MG/DL — HIGH (ref 70–99)
GLUCOSE BLDC GLUCOMTR-MCNC: 201 MG/DL — HIGH (ref 70–99)
GLUCOSE SERPL-MCNC: 180 MG/DL — HIGH (ref 70–99)
HCT VFR BLD CALC: 37.9 % — LOW (ref 39–50)
HGB BLD-MCNC: 11.8 G/DL — LOW (ref 13–17)
IANC: 7.65 K/UL — SIGNIFICANT CHANGE UP (ref 1.5–8.5)
LYMPHOCYTES # BLD AUTO: 0.95 K/UL — LOW (ref 1–3.3)
LYMPHOCYTES # BLD AUTO: 8.3 % — LOW (ref 13–44)
MAGNESIUM SERPL-MCNC: 1.8 MG/DL — SIGNIFICANT CHANGE UP (ref 1.6–2.6)
MCHC RBC-ENTMCNC: 26 PG — LOW (ref 27–34)
MCHC RBC-ENTMCNC: 31.1 GM/DL — LOW (ref 32–36)
MCV RBC AUTO: 83.7 FL — SIGNIFICANT CHANGE UP (ref 80–100)
MONOCYTES # BLD AUTO: 0.74 K/UL — SIGNIFICANT CHANGE UP (ref 0–0.9)
MONOCYTES NFR BLD AUTO: 6.5 % — SIGNIFICANT CHANGE UP (ref 2–14)
NEUTROPHILS # BLD AUTO: 8.79 K/UL — HIGH (ref 1.8–7.4)
NEUTROPHILS NFR BLD AUTO: 76.8 % — SIGNIFICANT CHANGE UP (ref 43–77)
PHOSPHATE SERPL-MCNC: 3.2 MG/DL — SIGNIFICANT CHANGE UP (ref 2.5–4.5)
PLATELET # BLD AUTO: 260 K/UL — SIGNIFICANT CHANGE UP (ref 150–400)
POTASSIUM SERPL-MCNC: 3.8 MMOL/L — SIGNIFICANT CHANGE UP (ref 3.5–5.3)
POTASSIUM SERPL-SCNC: 3.8 MMOL/L — SIGNIFICANT CHANGE UP (ref 3.5–5.3)
PROT SERPL-MCNC: 6.7 G/DL — SIGNIFICANT CHANGE UP (ref 6–8.3)
RBC # BLD: 4.53 M/UL — SIGNIFICANT CHANGE UP (ref 4.2–5.8)
RBC # FLD: 14.6 % — HIGH (ref 10.3–14.5)
SODIUM SERPL-SCNC: 137 MMOL/L — SIGNIFICANT CHANGE UP (ref 135–145)
WBC # BLD: 11.45 K/UL — HIGH (ref 3.8–10.5)
WBC # FLD AUTO: 11.45 K/UL — HIGH (ref 3.8–10.5)

## 2021-02-10 PROCEDURE — 99232 SBSQ HOSP IP/OBS MODERATE 35: CPT

## 2021-02-10 PROCEDURE — 99239 HOSP IP/OBS DSCHRG MGMT >30: CPT

## 2021-02-10 RX ORDER — CANAGLIFLOZIN AND METFORMIN HYDROCHLORIDE 50; 500 MG/1; MG/1
1 TABLET, FILM COATED, EXTENDED RELEASE ORAL
Qty: 0 | Refills: 0 | DISCHARGE

## 2021-02-10 RX ORDER — ISOSORBIDE MONONITRATE 60 MG/1
1 TABLET, EXTENDED RELEASE ORAL
Qty: 0 | Refills: 0 | DISCHARGE
Start: 2021-02-10

## 2021-02-10 RX ORDER — INSULIN LISPRO 100/ML
VIAL (ML) SUBCUTANEOUS AT BEDTIME
Refills: 0 | Status: DISCONTINUED | OUTPATIENT
Start: 2021-02-10 | End: 2021-02-10

## 2021-02-10 RX ORDER — DORZOLAMIDE HYDROCHLORIDE 20 MG/ML
1 SOLUTION/ DROPS OPHTHALMIC
Qty: 0 | Refills: 0 | DISCHARGE
Start: 2021-02-10

## 2021-02-10 RX ORDER — LATANOPROST 0.05 MG/ML
1 SOLUTION/ DROPS OPHTHALMIC; TOPICAL
Qty: 0 | Refills: 0 | DISCHARGE
Start: 2021-02-10

## 2021-02-10 RX ORDER — CIPROFLOXACIN LACTATE 400MG/40ML
1 VIAL (ML) INTRAVENOUS
Qty: 3 | Refills: 0
Start: 2021-02-10 | End: 2021-02-12

## 2021-02-10 RX ORDER — INSULIN ASPART 100 [IU]/ML
4 INJECTION, SOLUTION SUBCUTANEOUS
Qty: 15 | Refills: 0
Start: 2021-02-10

## 2021-02-10 RX ORDER — INSULIN GLARGINE 100 [IU]/ML
68 INJECTION, SOLUTION SUBCUTANEOUS
Qty: 0 | Refills: 0 | DISCHARGE

## 2021-02-10 RX ORDER — ASPIRIN/CALCIUM CARB/MAGNESIUM 324 MG
1 TABLET ORAL
Qty: 0 | Refills: 0 | DISCHARGE
Start: 2021-02-10

## 2021-02-10 RX ORDER — INSULIN LISPRO 100/ML
VIAL (ML) SUBCUTANEOUS
Refills: 0 | Status: DISCONTINUED | OUTPATIENT
Start: 2021-02-10 | End: 2021-02-10

## 2021-02-10 RX ORDER — SITAGLIPTIN 50 MG/1
1 TABLET, FILM COATED ORAL
Qty: 0 | Refills: 0 | DISCHARGE

## 2021-02-10 RX ORDER — INSULIN LISPRO 100/ML
1 VIAL (ML) SUBCUTANEOUS
Qty: 15 | Refills: 0
Start: 2021-02-10 | End: 2021-03-11

## 2021-02-10 RX ORDER — RIVAROXABAN 15 MG-20MG
1 KIT ORAL
Qty: 30 | Refills: 0
Start: 2021-02-10 | End: 2021-03-11

## 2021-02-10 RX ORDER — INSULIN ASPART 100 [IU]/ML
15 INJECTION, SOLUTION SUBCUTANEOUS
Qty: 0 | Refills: 0 | DISCHARGE

## 2021-02-10 RX ORDER — INSULIN GLARGINE 100 [IU]/ML
32 INJECTION, SOLUTION SUBCUTANEOUS
Qty: 15 | Refills: 0
Start: 2021-02-10

## 2021-02-10 RX ORDER — INSULIN LISPRO 100/ML
4 VIAL (ML) SUBCUTANEOUS
Refills: 0 | Status: DISCONTINUED | OUTPATIENT
Start: 2021-02-10 | End: 2021-02-10

## 2021-02-10 RX ORDER — LOSARTAN POTASSIUM 100 MG/1
1 TABLET, FILM COATED ORAL
Qty: 0 | Refills: 0 | DISCHARGE
Start: 2021-02-10

## 2021-02-10 RX ADMIN — Medication 2: at 10:31

## 2021-02-10 RX ADMIN — Medication 5 UNIT(S): at 10:31

## 2021-02-10 RX ADMIN — Medication 4 UNIT(S): at 17:28

## 2021-02-10 RX ADMIN — Medication 4 UNIT(S): at 13:02

## 2021-02-10 RX ADMIN — AMLODIPINE BESYLATE 10 MILLIGRAM(S): 2.5 TABLET ORAL at 06:59

## 2021-02-10 RX ADMIN — CLOPIDOGREL BISULFATE 75 MILLIGRAM(S): 75 TABLET, FILM COATED ORAL at 12:01

## 2021-02-10 RX ADMIN — ENOXAPARIN SODIUM 40 MILLIGRAM(S): 100 INJECTION SUBCUTANEOUS at 12:01

## 2021-02-10 RX ADMIN — Medication 81 MILLIGRAM(S): at 12:01

## 2021-02-10 RX ADMIN — DORZOLAMIDE HYDROCHLORIDE 1 DROP(S): 20 SOLUTION/ DROPS OPHTHALMIC at 06:41

## 2021-02-10 RX ADMIN — ISOSORBIDE MONONITRATE 60 MILLIGRAM(S): 60 TABLET, EXTENDED RELEASE ORAL at 12:02

## 2021-02-10 RX ADMIN — LOSARTAN POTASSIUM 50 MILLIGRAM(S): 100 TABLET, FILM COATED ORAL at 06:59

## 2021-02-10 RX ADMIN — Medication 2: at 17:28

## 2021-02-10 RX ADMIN — Medication 145 MILLIGRAM(S): at 12:02

## 2021-02-10 RX ADMIN — DORZOLAMIDE HYDROCHLORIDE 1 DROP(S): 20 SOLUTION/ DROPS OPHTHALMIC at 12:02

## 2021-02-10 RX ADMIN — Medication 1: at 13:02

## 2021-02-10 NOTE — CHART NOTE - NSCHARTNOTEFT_GEN_A_CORE
Case d/w Endocrine NP, recommends discontinuing Invokana-met and Januvia. Pt to be dc'd on Lantus 32u qHS, lispro 4/4/4 TID AC, low dose correctional scale.

## 2021-02-10 NOTE — PROVIDER CONTACT NOTE (OTHER) - SITUATION
Called by 6T re: transportation update for pt
Patient's BP is 190/96, heart rate 110 and temperature 100 orally.
Pt VS as follows:  temp 100.6 oral    /98  RR 22
Pt HTN after hydralazine push
Temp 100.6. . PA notified.
Patient in DKA  and on Insulin drip, Glucose level 498 at 0100 and 508 at 0200
Temp 100.8/ . PA notified of ongoing situation.
Repeat temp 101.7. PA notified.

## 2021-02-10 NOTE — DISCHARGE NOTE NURSING/CASE MANAGEMENT/SOCIAL WORK - NSDCPNINST_GEN_ALL_CORE
please call your provider or go to the ER if you experience any chest pain, shortness of breath, uncontrolled pain, fever > 100.4, and nausea/vomiting.

## 2021-02-10 NOTE — PROVIDER CONTACT NOTE (OTHER) - BACKGROUND
Patient is intubated/sedated, diabetic and on Insulin drip, glucose levels remains high
PTs BP in /96. Given hydralazine IV x1. As per night RN pt tachy at times.
Pt going to sub-acute rehab [facility]-unknown transport vendor
PT febrile at 0750 to 100.6 rectally. Given tylenol 1gm IV x1.
Pt A&Ox3 disoriented to time, admitted 1/22 in ARF, admitted to Surg B intubated, extubated 1/31.
Pt A&Ox3 disoriented to time, admitted 1/22 in ARF, admitted to Surg B intubated, extubated 1/31.
Tachy and febrile throughout shift. Given tylenol x2.

## 2021-02-10 NOTE — CHART NOTE - NSCHARTNOTESELECT_GEN_ALL_CORE
Endocrine
Endocrinology
Event Note
MICU ACCEPT/Event Note
POCUS
Endocrine
Endocrine follow up chart note/Event Note
Endocrinology
Event Note
MAR accept note/Event Note
Transfer Note
endocrine/Event Note

## 2021-02-10 NOTE — PROGRESS NOTE ADULT - ASSESSMENT
70 M with PMH CAD s/p stents x3 (2015), DM2, BPH who presents to the hospital with worsening SOB  Leukocytosis, fever  CXR with opacities  Patient on RA  S/p Remd/Dexa for COVID PNA  Sputum with MSSA  Had course of Unasyn/CTX for H influenzae and MSSA in sputum, stopped 1/31  Appears improved  Overall,  1) MSSA PNA/HAP  - Worsening resp status, signs sepsis; patient ill appearing; s/p treatment for MSSA/HCAP PNA  - S/p 7 days of Zosyn, would also DC Levaquin (not reliable against MSSA)  2) Fever/Leukocytosis  - Monitor for possible alternate source  - Trend to normal  3) COVID  - S/p Remd/Dexa  - Supportive care    Signing off. Please call with further questions or change in status.    Bhupendra Yun MD  Pager [phone]  After 5pm and on weekends call [phone]

## 2021-02-10 NOTE — PROVIDER CONTACT NOTE (OTHER) - ASSESSMENT
Pt HTN after hydralazine push
[facility].  ETA by 9:30 p.m.
Pt A&Ox2-3. No complaints of headache or chest pain. BP is elevated and heart rate as well.
Temp 100.8/ . PT appears less warm and more alert.
Temp 101.7 rectal. PT continues to be tachy to 120s
Insulin drip continues and rate increased, IV access and line checked
Pt VS as follows:  temp 100.6 oral    /98  RR 22
BP improved to 154/76. . Temp 100.1 axillary. Rectal temp 100.6.

## 2021-02-10 NOTE — DISCHARGE NOTE NURSING/CASE MANAGEMENT/SOCIAL WORK - NSDCPEPT PROEDMA_GEN_ALL_CORE
1  Preop consultation -patient is medically stable at this time for planned procedure scheduled on the 8/29/2019 with Dr Jl Davis  EKG was performed in the office and without acute changes  Comparison from March of 2018 was stable  Blood work was reviewed with the patient and stable  Recent CT of the chest showing stable pulmonary nodules  2  Left inguinal hernia-stable for surgery as planned  3   Chronic kidney disease -presently stable  Creatinine is at baseline at 1 47   4   Hypertension -elevated in the office initially but patient does keep home readings of averaging 130/85  He continues lisinopril with hydrochlorothiazide  5   Hyperlipidemia, stable no medication changes 
Yes

## 2021-02-10 NOTE — DISCHARGE NOTE NURSING/CASE MANAGEMENT/SOCIAL WORK - PATIENT PORTAL LINK FT
You can access the FollowMyHealth Patient Portal offered by [facility] by registering at the following website: [email or web address]. By joining [employer]’s FollowMyHealth portal, you will also be able to view your health information using other applications (apps) compatible with our system.

## 2021-02-10 NOTE — CHART NOTE - NSCHARTNOTEFT_GEN_A_CORE
70 year old man with PMH CAD s/p stents x3 (2015), uncontrolled DM2, HTN, HLD, BPH who presents to the hospital with worsening SOB, admitted with +COVID. Endocrine consult called for management of uncontrolled DM2 in critically ill patient, initially intubated for COVID infection. Pt now extubated on O2 via NC. Pt with inpt course complicated by hypernatremia requiring dextrose, with hyperglycemia as a result. Not on steroids     BG, insulin administration and chart reviewed  Patient with tightly controlled glucose last night at bedtime - 78 mg/dl   Recommend to reduce Admelog to 4/4/4 and Admelog to LOW dose correctional scales before meals and bedtime  Continue with Lantus 32 units SQ qHS (fasting glucose 180 mg/dl on serum)  Check BG before meals and bedtime  Consistent carbohydrate diet  DC planning: Resume basal/bolus (Novolog and Basaglar) dose TBD  Will follow     CAPILLARY BLOOD GLUCOSE    POCT Blood Glucose.: 174 mg/dL (10 Feb 2021 09:42)  POCT Blood Glucose.: 155 mg/dL (09 Feb 2021 22:42)  POCT Blood Glucose.: 78 mg/dL (09 Feb 2021 21:19)  POCT Blood Glucose.: 120 mg/dL (09 Feb 2021 17:40)  POCT Blood Glucose.: 191 mg/dL (09 Feb 2021 13:22)    02-10    137  |  104  |  14  ----------------------------<  180<H>  3.8   |  21<L>  |  0.81    Ca    8.9      10 Feb 2021 08:19  Phos  3.2     02-10  Mg     1.8     02-10    TPro  6.7  /  Alb  3.1<L>  /  TBili  0.4  /  DBili  x   /  AST  63<H>  /  ALT  60<H>  /  AlkPhos  54  02-10    MEDICATIONS  (STANDING):  amLODIPine   Tablet 10 milliGRAM(s) Oral daily  aspirin  chewable 81 milliGRAM(s) Oral daily  clopidogrel Tablet 75 milliGRAM(s) Oral daily  dextrose 40% Gel 15 Gram(s) Oral once  dextrose 5%. 1000 milliLiter(s) (50 mL/Hr) IV Continuous <Continuous>  dextrose 5%. 1000 milliLiter(s) (100 mL/Hr) IV Continuous <Continuous>  dextrose 50% Injectable 25 Gram(s) IV Push once  dextrose 50% Injectable 12.5 Gram(s) IV Push once  dorzolamide 2% Ophthalmic Solution 1 Drop(s) Both EYES every 8 hours  enoxaparin Injectable 40 milliGRAM(s) SubCutaneous daily  fenofibrate Tablet 145 milliGRAM(s) Oral daily  glucagon  Injectable 1 milliGRAM(s) IntraMuscular once  insulin glargine Injectable (LANTUS) 32 Unit(s) SubCutaneous at bedtime  insulin lispro (ADMELOG) corrective regimen sliding scale   SubCutaneous three times a day before meals  insulin lispro (ADMELOG) corrective regimen sliding scale   SubCutaneous at bedtime  insulin lispro Injectable (ADMELOG) 4 Unit(s) SubCutaneous three times a day before meals  isosorbide   mononitrate ER Tablet (IMDUR) 60 milliGRAM(s) Oral daily  latanoprost 0.005% Ophthalmic Solution 1 Drop(s) Both EYES at bedtime  levoFLOXacin  Tablet 750 milliGRAM(s) Oral every 24 hours  losartan 50 milliGRAM(s) Oral daily  simvastatin 20 milliGRAM(s) Oral at bedtime  tamsulosin 0.4 milliGRAM(s) Oral at bedtime    Diet, Dysphagia 1 Pureed-Nectar Consistency Fluid (02-07-21 @ 11:56)    A1C with Estimated Average Glucose Result: 9.6 % (01-22-21 @ 13:19)      Luz Sheehan  Nurse Practitioner  Division of Endocrinology & Diabetes  In house pager #[phone]/long range pager #[phone]    If before 9AM or after 6PM, or on weekends/holidays, please call endocrine answering service for assistance ([phone]).  For nonurgent matters email [email or web address] for assistance. 70 year old man with PMH CAD s/p stents x3 (2015), uncontrolled DM2, HTN, HLD, BPH who presents to the hospital with worsening SOB, admitted with +COVID. Endocrine consult called for management of uncontrolled DM2 in critically ill patient, initially intubated for COVID infection. Pt now extubated on O2 via NC. Pt with inpt course complicated by hypernatremia requiring dextrose, with hyperglycemia as a result. Not on steroids     BG, insulin administration and chart reviewed  Patient with tightly controlled glucose last night at bedtime - 78 mg/dl   Recommend to reduce Admelog to 4/4/4 and Admelog to LOW dose correctional scales before meals and bedtime  Continue with Lantus 32 units SQ qHS (fasting glucose 180 mg/dl on serum)  Check BG before meals and bedtime  Consistent carbohydrate diet  DC planning: Resume basal/bolus (Novolog and Basaglar) dose TBD  Will follow     CAPILLARY BLOOD GLUCOSE    POCT Blood Glucose.: 174 mg/dL (10 Feb 2021 09:42)  POCT Blood Glucose.: 155 mg/dL (09 Feb 2021 22:42)  POCT Blood Glucose.: 78 mg/dL (09 Feb 2021 21:19)  POCT Blood Glucose.: 120 mg/dL (09 Feb 2021 17:40)  POCT Blood Glucose.: 191 mg/dL (09 Feb 2021 13:22)    02-10    137  |  104  |  14  ----------------------------<  180<H>  3.8   |  21<L>  |  0.81    Ca    8.9      10 Feb 2021 08:19  Phos  3.2     02-10  Mg     1.8     02-10    TPro  6.7  /  Alb  3.1<L>  /  TBili  0.4  /  DBili  x   /  AST  63<H>  /  ALT  60<H>  /  AlkPhos  54  02-10    MEDICATIONS  (STANDING):  amLODIPine   Tablet 10 milliGRAM(s) Oral daily  aspirin  chewable 81 milliGRAM(s) Oral daily  clopidogrel Tablet 75 milliGRAM(s) Oral daily  dextrose 40% Gel 15 Gram(s) Oral once  dextrose 5%. 1000 milliLiter(s) (50 mL/Hr) IV Continuous <Continuous>  dextrose 5%. 1000 milliLiter(s) (100 mL/Hr) IV Continuous <Continuous>  dextrose 50% Injectable 25 Gram(s) IV Push once  dextrose 50% Injectable 12.5 Gram(s) IV Push once  dorzolamide 2% Ophthalmic Solution 1 Drop(s) Both EYES every 8 hours  enoxaparin Injectable 40 milliGRAM(s) SubCutaneous daily  fenofibrate Tablet 145 milliGRAM(s) Oral daily  glucagon  Injectable 1 milliGRAM(s) IntraMuscular once  insulin glargine Injectable (LANTUS) 32 Unit(s) SubCutaneous at bedtime  insulin lispro (ADMELOG) corrective regimen sliding scale   SubCutaneous three times a day before meals  insulin lispro (ADMELOG) corrective regimen sliding scale   SubCutaneous at bedtime  insulin lispro Injectable (ADMELOG) 4 Unit(s) SubCutaneous three times a day before meals  isosorbide   mononitrate ER Tablet (IMDUR) 60 milliGRAM(s) Oral daily  latanoprost 0.005% Ophthalmic Solution 1 Drop(s) Both EYES at bedtime  levoFLOXacin  Tablet 750 milliGRAM(s) Oral every 24 hours  losartan 50 milliGRAM(s) Oral daily  simvastatin 20 milliGRAM(s) Oral at bedtime  tamsulosin 0.4 milliGRAM(s) Oral at bedtime    Diet, Dysphagia 1 Pureed-Nectar Consistency Fluid (02-07-21 @ 11:56)    A1C with Estimated Average Glucose Result: 9.6 % (01-22-21 @ 13:19)      Luz Sheehan  Nurse Practitioner  Division of Endocrinology & Diabetes  In house pager #[phone]/long range pager #[phone]    If before 9AM or after 6PM, or on weekends/holidays, please call endocrine answering service for assistance ([phone]).  For nonurgent matters email [email or web address] for assistance.        ADDENDUM 17:15    Contacted by primary team for discharge recommendations for today  Per care coordination notes, patient being dc to rehab facility ([facility])  When he was home prior to admit, he had assistance from his family/aides with insulin. Was on Novolog and Basaglar pens plus Invokanamet and Janvuia.  For discharge to rehab, please continue current dosing - Lantus 32 units SQ qHS and Admelog 4/4/4 and Admelog LOW dose correctional scales as currently ordered  For discharge to home (after rehab)- recommend Basaglar 32 units SQ qHS and Novolog 4/4/4 WITHOUT correctional scales. STOP Invokanamet and Januvia  Ensure that patient has supplies including glucometer, glucose test strips, lancets, alcohol swabs and insulin pen needles   Followup with PCP, followup with prior endocrinologist     Reviewed discharge plan with daughter in law, Lisa ([phone]) - patient lives with her, and she provides aide services for him (29h/week). She reports she will be able to observe insulin administration once he is discharged home from rehab, will try to coordinate with other family members to assist when she is not there (she knows how to use insulin pens; but reports patient was mostly taking by himself prior to admission). On initial endocrine consult, difficult to obtain collateral info from patient due to pt being intubated/sedated, confirmed with daughter in law that prior insulin doses were Basaglar 68 units once a day and Novolog 10 units TID before meals (consult listed 15 TID). Per daughter in law, patient follows with Dr. CRISTY Valencia (endocrine) on [facility].   Reviewed suggested discharge plan with Lisa and our recommendation to stop prior oral agents - however informed her that rehab may change these insulin doses (above).    Note that if patient is admitted to [facility] again, please contact Dr. Valencia if needing endocrinology assistance inpatient    Reviewed discharge recommendation with primary team MARCELA Horne

## 2021-02-10 NOTE — PROVIDER CONTACT NOTE (OTHER) - REASON
Patient's BP is 190/96, heart rate 110 and temperature 100 orally.
Temp 101.7
HTN after intervention
temp 100.6
transportation follow up
Glucose levels high
Temp 100.8/ 
Hypertensive, tachycardic, tachypnic, febrile

## 2021-02-10 NOTE — PROGRESS NOTE ADULT - SUBJECTIVE AND OBJECTIVE BOX
CC: F/U for PNA    Saw/spoke to patient. No fevers, no chills. No new complaints.    Allergies  No Known Allergies    ANTIMICROBIALS:  levoFLOXacin  Tablet 750 every 24 hours    PE:    Vital Signs Last 24 Hrs  T(C): 36.4 (10 Feb 2021 09:46), Max: 36.8 (09 Feb 2021 17:58)  T(F): 97.6 (10 Feb 2021 09:46), Max: 98.2 (09 Feb 2021 17:58)  HR: 90 (10 Feb 2021 11:57) (90 - 94)  BP: 119/67 (10 Feb 2021 11:57) (119/67 - 152/96)  RR: 18 (10 Feb 2021 11:57) (18 - 18)  SpO2: 98% (10 Feb 2021 11:57) (98% - 99%)    Gen: AOx3, NAD, non-toxic  CV: S1+S2 normal, nontachycardic  Resp: Clear bilat, no resp distress, no crackles/wheezes, RA  Abd: Soft, nontender, +BS  Ext: No LE edema, no wounds    LABS:                        11.8   11.45 )-----------( 260      ( 10 Feb 2021 08:19 )             37.9     02-10    137  |  104  |  14  ----------------------------<  180<H>  3.8   |  21<L>  |  0.81    Ca    8.9      10 Feb 2021 08:19  Phos  3.2     02-10  Mg     1.8     02-10    TPro  6.7  /  Alb  3.1<L>  /  TBili  0.4  /  DBili  x   /  AST  63<H>  /  ALT  60<H>  /  AlkPhos  54  02-10    MICROBIOLOGY:    .Blood Blood-Peripheral  02-03-21   No Growth Final     .Sputum Sputum trap  01-27-21   Numerous Staphylococcus aureus  Normal Respiratory Susan absent  --  Staphylococcus aureus    .Blood Blood  01-27-21   No Growth Final     .Blood Blood-Peripheral  01-22-21   No Growth Final     .Sputum Sputum  01-22-21   Moderate Staphylococcus aureus  Moderate Haemophilus influenzae "Susceptibilities not performed"  Normal Respiratory Susan present  --  Staphylococcus aureus    (otherwise reviewed)    RADIOLOGY:    2/2 XR:    Impression:improved LEFT lung alveolar infiltrates with minimal residual in the LEFT lower lobe as well as small infiltrates in the peripheral RIGHT lung.

## 2021-02-10 NOTE — PROVIDER CONTACT NOTE (OTHER) - ACTION/TREATMENT ORDERED:
Give hydralazine and tylenol, apply ice packs, recheck BP in 20 mins
[facility] to [facility]; [facility] staff informed.
PA aware of situation. Will follow up with attending about possibly ordering toradol for temp.
Insulin drip rate increased, continue to check glucose levels every hour
PA aware, Will follow up with attending.
PA made aware. Ordered hydralazine. Recheck BP in 1 hour.
Give hydralazine again and recheck BP
PA aware of situation. Will order tylenol IV. Continue to monitor.

## 2021-02-26 PROBLEM — I10 ESSENTIAL (PRIMARY) HYPERTENSION: Chronic | Status: ACTIVE | Noted: 2021-01-22

## 2021-02-26 PROBLEM — K21.9 GASTRO-ESOPHAGEAL REFLUX DISEASE WITHOUT ESOPHAGITIS: Chronic | Status: ACTIVE | Noted: 2021-01-22

## 2021-02-26 PROBLEM — E11.9 TYPE 2 DIABETES MELLITUS WITHOUT COMPLICATIONS: Chronic | Status: ACTIVE | Noted: 2021-01-22

## 2021-02-26 PROBLEM — E78.5 HYPERLIPIDEMIA, UNSPECIFIED: Chronic | Status: ACTIVE | Noted: 2021-01-22

## 2021-02-26 PROBLEM — E11.22 TYPE 2 DIABETES MELLITUS WITH DIABETIC CHRONIC KIDNEY DISEASE: Chronic | Status: ACTIVE | Noted: 2021-01-22

## 2021-02-26 PROBLEM — I25.10 ATHEROSCLEROTIC HEART DISEASE OF NATIVE CORONARY ARTERY WITHOUT ANGINA PECTORIS: Chronic | Status: ACTIVE | Noted: 2021-01-22

## 2021-02-26 PROBLEM — N40.0 BENIGN PROSTATIC HYPERPLASIA WITHOUT LOWER URINARY TRACT SYMPTOMS: Chronic | Status: ACTIVE | Noted: 2021-01-22

## 2021-03-10 ENCOUNTER — LABORATORY RESULT (OUTPATIENT)
Age: 71
End: 2021-03-10

## 2021-03-10 ENCOUNTER — APPOINTMENT (OUTPATIENT)
Dept: FAMILY MEDICINE | Facility: CLINIC | Age: 71
End: 2021-03-10
Payer: MEDICARE

## 2021-03-10 VITALS
HEART RATE: 66 BPM | BODY MASS INDEX: 24.73 KG/M2 | HEIGHT: 69 IN | SYSTOLIC BLOOD PRESSURE: 136 MMHG | OXYGEN SATURATION: 97 % | WEIGHT: 167 LBS | DIASTOLIC BLOOD PRESSURE: 70 MMHG | TEMPERATURE: 97.7 F

## 2021-03-10 DIAGNOSIS — Z78.9 OTHER SPECIFIED HEALTH STATUS: ICD-10-CM

## 2021-03-10 DIAGNOSIS — H40.9 UNSPECIFIED GLAUCOMA: ICD-10-CM

## 2021-03-10 PROCEDURE — 99204 OFFICE O/P NEW MOD 45 MIN: CPT | Mod: 25

## 2021-03-10 PROCEDURE — 99072 ADDL SUPL MATRL&STAF TM PHE: CPT

## 2021-03-10 NOTE — HISTORY OF PRESENT ILLNESS
[FreeTextEntry8] : cc-- pt to establish care\par \par 71 yo M with DM, CAD, HTN, HLD, glaucoma was hospitalized  at [facility] from 1/22 - 2/10/21 for COVID plus H. influenza pneumonia. He required intubation. Hospital course with complicated with AMS due to hypernatremia, metabolic encephalopathy due to dysphagia and protein calorie malnutrition. \par \par Had PT evaluation, will be getting home PT for mobility and balance training. \par \par Pt has M11Q form to be completed.\par \par He was following with cardiology-- wants to transfer care to [facility].\par Has eye doctor, but wants to transfer care to [facility].

## 2021-03-10 NOTE — PHYSICAL EXAM
[Normal] : affect was normal and insight and judgment were intact [de-identified] : unsteady, uses wheeled walker.

## 2021-03-11 LAB
25(OH)D3 SERPL-MCNC: 23.9 NG/ML
ALBUMIN SERPL ELPH-MCNC: 4.2 G/DL
ALP BLD-CCNC: 36 U/L
ALT SERPL-CCNC: 12 U/L
ANION GAP SERPL CALC-SCNC: 14 MMOL/L
AST SERPL-CCNC: 17 U/L
BASOPHILS # BLD AUTO: 0.06 K/UL
BASOPHILS NFR BLD AUTO: 0.8 %
BILIRUB SERPL-MCNC: 0.3 MG/DL
BUN SERPL-MCNC: 11 MG/DL
CALCIUM SERPL-MCNC: 9.4 MG/DL
CHLORIDE SERPL-SCNC: 107 MMOL/L
CHOLEST SERPL-MCNC: 142 MG/DL
CO2 SERPL-SCNC: 21 MMOL/L
CREAT SERPL-MCNC: 1.01 MG/DL
CREAT SPEC-SCNC: 34 MG/DL
EOSINOPHIL # BLD AUTO: 0.17 K/UL
EOSINOPHIL NFR BLD AUTO: 2.2 %
ESTIMATED AVERAGE GLUCOSE: 146 MG/DL
GLUCOSE SERPL-MCNC: 122 MG/DL
HBA1C MFR BLD HPLC: 6.7 %
HCT VFR BLD CALC: 38.1 %
HDLC SERPL-MCNC: 31 MG/DL
HGB BLD-MCNC: 11.5 G/DL
IMM GRANULOCYTES NFR BLD AUTO: 0.3 %
LDLC SERPL CALC-MCNC: 71 MG/DL
LYMPHOCYTES # BLD AUTO: 3.56 K/UL
LYMPHOCYTES NFR BLD AUTO: 45.9 %
MAN DIFF?: NORMAL
MCHC RBC-ENTMCNC: 27.2 PG
MCHC RBC-ENTMCNC: 30.2 GM/DL
MCV RBC AUTO: 90.1 FL
MICROALBUMIN 24H UR DL<=1MG/L-MCNC: <1.2 MG/DL
MICROALBUMIN/CREAT 24H UR-RTO: NORMAL MG/G
MONOCYTES # BLD AUTO: 0.68 K/UL
MONOCYTES NFR BLD AUTO: 8.8 %
NEUTROPHILS # BLD AUTO: 3.27 K/UL
NEUTROPHILS NFR BLD AUTO: 42 %
NONHDLC SERPL-MCNC: 111 MG/DL
PLATELET # BLD AUTO: 287 K/UL
POTASSIUM SERPL-SCNC: 4.1 MMOL/L
PROT SERPL-MCNC: 6.9 G/DL
RBC # BLD: 4.23 M/UL
RBC # FLD: 16.2 %
SODIUM SERPL-SCNC: 141 MMOL/L
TRIGL SERPL-MCNC: 199 MG/DL
TSH SERPL-ACNC: 0.11 UIU/ML
VIT B12 SERPL-MCNC: 577 PG/ML
WBC # FLD AUTO: 7.76 K/UL

## 2021-03-18 ENCOUNTER — APPOINTMENT (OUTPATIENT)
Dept: CARDIOLOGY | Facility: CLINIC | Age: 71
End: 2021-03-18
Payer: MEDICARE

## 2021-03-18 ENCOUNTER — NON-APPOINTMENT (OUTPATIENT)
Age: 71
End: 2021-03-18

## 2021-03-18 VITALS
WEIGHT: 167 LBS | HEART RATE: 70 BPM | SYSTOLIC BLOOD PRESSURE: 118 MMHG | HEIGHT: 69 IN | TEMPERATURE: 97.3 F | BODY MASS INDEX: 24.73 KG/M2 | OXYGEN SATURATION: 98 % | DIASTOLIC BLOOD PRESSURE: 64 MMHG

## 2021-03-18 DIAGNOSIS — U07.1 COVID-19: ICD-10-CM

## 2021-03-18 DIAGNOSIS — Z83.3 FAMILY HISTORY OF DIABETES MELLITUS: ICD-10-CM

## 2021-03-18 PROCEDURE — 93306 TTE W/DOPPLER COMPLETE: CPT

## 2021-03-18 PROCEDURE — 99204 OFFICE O/P NEW MOD 45 MIN: CPT

## 2021-03-18 PROCEDURE — 99072 ADDL SUPL MATRL&STAF TM PHE: CPT

## 2021-03-18 PROCEDURE — 93000 ELECTROCARDIOGRAM COMPLETE: CPT

## 2021-03-18 NOTE — HISTORY OF PRESENT ILLNESS
[FreeTextEntry1] : Bryan Vidal is a 70 year old male with history of CAD s.p stents several years ago in [place] , hypertension, hypercholesterolemia, and diabetes comes for cardiac evaluation. Used to follow up with different Cardiologist and had stress test couple of years ago and was told normal. Denies any cheat pain or palpitations. Had COVID infection couple of months ago was intubated at that time and treated. Complaining shortness of breath since COVID infection and improving. Getting physical therapy. Compliant to medications and diet.

## 2021-03-18 NOTE — PHYSICAL EXAM
[General Appearance - Well Developed] : well developed [Normal Appearance] : normal appearance [Well Groomed] : well groomed [General Appearance - Well Nourished] : well nourished [No Deformities] : no deformities [General Appearance - In No Acute Distress] : no acute distress [Normal Conjunctiva] : the conjunctiva exhibited no abnormalities [Eyelids - No Xanthelasma] : the eyelids demonstrated no xanthelasmas [Normal Oral Mucosa] : normal oral mucosa [No Oral Pallor] : no oral pallor [No Oral Cyanosis] : no oral cyanosis [Heart Rate And Rhythm] : heart rate and rhythm were normal [Heart Sounds] : normal S1 and S2 [Murmurs] : no murmurs present [Arterial Pulses Normal] : the arterial pulses were normal [Edema] : no peripheral edema present [Respiration, Rhythm And Depth] : normal respiratory rhythm and effort [Auscultation Breath Sounds / Voice Sounds] : lungs were clear to auscultation bilaterally [Bowel Sounds] : normal bowel sounds [Abdomen Soft] : soft [Abdomen Tenderness] : non-tender [Nail Clubbing] : no clubbing of the fingernails [Cyanosis, Localized] : no localized cyanosis [Skin Color & Pigmentation] : normal skin color and pigmentation [Skin Turgor] : normal skin turgor [] : no rash [Oriented To Time, Place, And Person] : oriented to person, place, and time [Impaired Insight] : insight and judgment were intact [No Anxiety] : not feeling anxious [FreeTextEntry1] : walks with cane

## 2021-03-18 NOTE — REASON FOR VISIT
[Consultation] : a consultation regarding [Coronary Artery Disease] : coronary artery disease [Hyperlipidemia] : hyperlipidemia [Hypertension] : hypertension [FreeTextEntry1] : shortness of breath.

## 2021-03-18 NOTE — DISCUSSION/SUMMARY
[FreeTextEntry1] : In a summary Bryan Vidal is an elderly male with CAD s/p stents, stable. Continue Aspirin. Hypertension, controlled. Continue current medications and 2 gm sodium diet. Echo done showed normal LV systolic function. Hypercholesterolemia, on statins and low cholesterol diet. LDL goal less than 70 g/dL. Diabetes, on medications and low Carb diet. Shortness of breath may be secondary to COVID, improving. Follow up in 3 months.

## 2021-03-31 DIAGNOSIS — N40.0 BENIGN PROSTATIC HYPERPLASIA WITHOUT LOWER URINARY TRACT SYMPMS: ICD-10-CM

## 2021-03-31 DIAGNOSIS — K59.00 CONSTIPATION, UNSPECIFIED: ICD-10-CM

## 2021-04-27 ENCOUNTER — APPOINTMENT (OUTPATIENT)
Dept: OPHTHALMOLOGY | Facility: CLINIC | Age: 71
End: 2021-04-27
Payer: MEDICARE

## 2021-04-27 ENCOUNTER — NON-APPOINTMENT (OUTPATIENT)
Age: 71
End: 2021-04-27

## 2021-04-27 PROCEDURE — 92004 COMPRE OPH EXAM NEW PT 1/>: CPT

## 2021-04-27 PROCEDURE — 92250 FUNDUS PHOTOGRAPHY W/I&R: CPT

## 2021-04-27 PROCEDURE — 92286 ANT SGM IMG I&R SPECLR MIC: CPT

## 2021-04-27 PROCEDURE — 99072 ADDL SUPL MATRL&STAF TM PHE: CPT

## 2021-04-27 PROCEDURE — 92025 CPTRIZED CORNEAL TOPOGRAPHY: CPT

## 2021-05-12 ENCOUNTER — NON-APPOINTMENT (OUTPATIENT)
Age: 71
End: 2021-05-12

## 2021-05-12 ENCOUNTER — APPOINTMENT (OUTPATIENT)
Dept: OPHTHALMOLOGY | Facility: CLINIC | Age: 71
End: 2021-05-12
Payer: MEDICARE

## 2021-05-12 PROCEDURE — 92083 EXTENDED VISUAL FIELD XM: CPT

## 2021-05-12 PROCEDURE — 76514 ECHO EXAM OF EYE THICKNESS: CPT

## 2021-05-12 PROCEDURE — 92012 INTRM OPH EXAM EST PATIENT: CPT

## 2021-05-12 PROCEDURE — 92133 CPTRZD OPH DX IMG PST SGM ON: CPT

## 2021-05-12 PROCEDURE — 99072 ADDL SUPL MATRL&STAF TM PHE: CPT

## 2021-05-14 ENCOUNTER — APPOINTMENT (OUTPATIENT)
Dept: FAMILY MEDICINE | Facility: CLINIC | Age: 71
End: 2021-05-14
Payer: MEDICARE

## 2021-05-14 VITALS
HEIGHT: 69 IN | HEART RATE: 64 BPM | BODY MASS INDEX: 24.59 KG/M2 | TEMPERATURE: 97.9 F | WEIGHT: 166 LBS | OXYGEN SATURATION: 98 % | SYSTOLIC BLOOD PRESSURE: 138 MMHG | DIASTOLIC BLOOD PRESSURE: 69 MMHG

## 2021-05-14 PROCEDURE — 99214 OFFICE O/P EST MOD 30 MIN: CPT | Mod: 25

## 2021-05-14 PROCEDURE — 99072 ADDL SUPL MATRL&STAF TM PHE: CPT

## 2021-05-14 NOTE — HISTORY OF PRESENT ILLNESS
[FreeTextEntry1] : DM f/u [de-identified] : 69 yo M with HTN, DM, CAD\par \par fasting sugar-- 245, 175, 300s-- basaglar 25 units\par before dinner-- 250-300s. Does not check before lunch, admelog 4 units 3 times a day\par \par dinner-- 2 bagels at 5pm and that's it. Sometimes has vegetables, beans, steamed fish\par lunch-- little bit of rice with vegetables or chicken\par breakfast-- 2 slices whole grain bread, hard boil egg, fruit cup. \par \par BP-- feels dizzy. BPs in mornings 130s/80s but before meds. After he takes his BP meds, 2-3 hours later he feels lightheaded. \par Pt to keep log of BP AFTER taking meds and in evening. May either stagger med times or discontinue meds due to hypotension.\par \par had eye exam-- legally blind but no DM retinopathy. He does have glaucoma as well.\par \par

## 2021-05-14 NOTE — PHYSICAL EXAM
[No Carotid Bruits] : no carotid bruits [Normal] : affect was normal and insight and judgment were intact

## 2021-05-17 LAB
ALBUMIN SERPL ELPH-MCNC: 4.7 G/DL
ALP BLD-CCNC: 58 U/L
ALT SERPL-CCNC: 8 U/L
ANION GAP SERPL CALC-SCNC: 9 MMOL/L
AST SERPL-CCNC: 12 U/L
BILIRUB SERPL-MCNC: 0.4 MG/DL
BUN SERPL-MCNC: 22 MG/DL
CALCIUM SERPL-MCNC: 10 MG/DL
CHLORIDE SERPL-SCNC: 104 MMOL/L
CHOLEST SERPL-MCNC: 143 MG/DL
CO2 SERPL-SCNC: 26 MMOL/L
CREAT SERPL-MCNC: 1 MG/DL
ESTIMATED AVERAGE GLUCOSE: 183 MG/DL
GLUCOSE SERPL-MCNC: 313 MG/DL
HBA1C MFR BLD HPLC: 8 %
HDLC SERPL-MCNC: 29 MG/DL
LDLC SERPL CALC-MCNC: 60 MG/DL
NONHDLC SERPL-MCNC: 114 MG/DL
POTASSIUM SERPL-SCNC: 4.3 MMOL/L
PROT SERPL-MCNC: 7.3 G/DL
SODIUM SERPL-SCNC: 140 MMOL/L
T4 FREE SERPL-MCNC: 1.1 NG/DL
TRIGL SERPL-MCNC: 273 MG/DL
TSH SERPL-ACNC: 0.4 UIU/ML

## 2021-06-11 ENCOUNTER — APPOINTMENT (OUTPATIENT)
Dept: FAMILY MEDICINE | Facility: CLINIC | Age: 71
End: 2021-06-11
Payer: MEDICARE

## 2021-06-11 VITALS
SYSTOLIC BLOOD PRESSURE: 117 MMHG | HEART RATE: 63 BPM | OXYGEN SATURATION: 98 % | DIASTOLIC BLOOD PRESSURE: 69 MMHG | TEMPERATURE: 98 F | WEIGHT: 170 LBS | BODY MASS INDEX: 25.18 KG/M2 | HEIGHT: 69 IN

## 2021-06-11 PROCEDURE — 99213 OFFICE O/P EST LOW 20 MIN: CPT

## 2021-06-11 RX ORDER — MULTIVIT-MIN/FOLIC/VIT K/LYCOP 400-300MCG
25 MCG TABLET ORAL
Refills: 0 | Status: ACTIVE | COMMUNITY

## 2021-06-11 NOTE — PHYSICAL EXAM
[Normal] : normal rate, regular rhythm, normal S1 and S2 and no murmur heard [Comprehensive Foot Exam Normal] : Right and left foot were examined and both feet are normal. No ulcers in either foot. Toes are normal and with full ROM.  Normal tactile sensation with monofilament testing throughout both feet [de-identified] : walks with cane

## 2021-06-11 NOTE — HISTORY OF PRESENT ILLNESS
[FreeTextEntry1] : DM f/u [de-identified] : morning sugars-- 170-190s. currently on basaglar 30 units.\par meal sugars 100s-130s.-- on admelog 10units.\par \par has stopped most carbs-- having multigrain bread, more vegetables fish. Has appt with endo next month.\par \par Pt now living with daughter, would like to go over med rec. \par \par

## 2021-06-28 RX ORDER — AMLODIPINE BESYLATE 2.5 MG/1
2.5 TABLET ORAL DAILY
Refills: 0 | Status: COMPLETED | COMMUNITY
End: 2021-06-28

## 2021-07-02 ENCOUNTER — APPOINTMENT (OUTPATIENT)
Dept: ENDOCRINOLOGY | Facility: CLINIC | Age: 71
End: 2021-07-02

## 2021-07-05 NOTE — DISCHARGE NOTE PROVIDER - NSDCDCMDCOMP_GEN_ALL_CORE
Regarding: wi - 38w6d - thinks water has broken, possible contractions  ----- Message from Bela Cowan sent at 7/5/2021  2:20 PM CDT -----  Patient Name: Marah Sparks    Full Name of Provider seen for current symptoms:nicolle price    Pregnant (If Yes, how long?):38w6d    Symptoms: thinks water has broken, possible contractions    Do you or any of your household members have the following symptoms:  Fever >100.0#F or >38.0#C: No    New or worsening cough, shortness of breath, sore throat, congestion, or runny nose: No    New onset of nausea, vomiting or diarrhea: No    New onset of loss of taste or smell, chills, repeated shaking with chills, muscle pain, or headache: No    Have you, a household member, or another person you have been in contact with tested positive for COVID-19 in the last 14 days?: No    Call Back #:[phone]    Call Center Account # for provider seen for current symptoms:[id number]    Which State are you currently located in? (enter State name in Summary field): [place]    Please update the Demographics section with the patients permanent resident address     Emergent COVID-19 Symptoms requiring Nurse Triage:  Trouble breathing, Persistent pain or pressure in the chest, New confusion, Inability to wake or stay awake, Bluish lips or face       This document is complete and the patient is ready for discharge.

## 2021-07-08 ENCOUNTER — NON-APPOINTMENT (OUTPATIENT)
Age: 71
End: 2021-07-08

## 2021-07-08 ENCOUNTER — APPOINTMENT (OUTPATIENT)
Dept: CARDIOLOGY | Facility: CLINIC | Age: 71
End: 2021-07-08
Payer: MEDICARE

## 2021-07-08 VITALS
SYSTOLIC BLOOD PRESSURE: 100 MMHG | WEIGHT: 173 LBS | DIASTOLIC BLOOD PRESSURE: 54 MMHG | TEMPERATURE: 97.6 F | HEIGHT: 69 IN | BODY MASS INDEX: 25.62 KG/M2 | HEART RATE: 64 BPM | OXYGEN SATURATION: 96 %

## 2021-07-08 DIAGNOSIS — R07.9 CHEST PAIN, UNSPECIFIED: ICD-10-CM

## 2021-07-08 PROCEDURE — 99214 OFFICE O/P EST MOD 30 MIN: CPT

## 2021-07-08 PROCEDURE — 93000 ELECTROCARDIOGRAM COMPLETE: CPT

## 2021-07-08 NOTE — PHYSICAL EXAM
[Normal Conjunctiva] : normal conjunctiva [No Carotid Bruit] : no carotid bruit [Soft] : abdomen soft [Non Tender] : non-tender [Normal Bowel Sounds] : normal bowel sounds [No Edema] : no edema [No Cyanosis] : no cyanosis [Moves all extremities] : moves all extremities [No Focal Deficits] : no focal deficits [Normal Speech] : normal speech [Normal] : alert and oriented, normal memory [de-identified] : walks with cane

## 2021-07-08 NOTE — DISCUSSION/SUMMARY
[FreeTextEntry1] : In a summary Mr. Donnelly, Bryan is an elderly male with CAD S/P stents, atypical chest pains and shortness of breath on exertion, will get Pharmacological nuclear stress test to rule out ischemia. Explained Mr. Donnelly and his daughter in detail. Appointment made at [facility]. Further plan based on stress test results. Hypertension, controlled. Continue current medications and 2 gm sodium diet. Hypercholesterolemia, on statins and low cholesterol diet. Diabetes, on medications and low Carb diet. Follow up in 3 months.

## 2021-07-08 NOTE — REASON FOR VISIT
[Hyperlipidemia] : hyperlipidemia [Hypertension] : hypertension [Coronary Artery Disease] : coronary artery disease [FreeTextEntry3] : Dr. Jackson

## 2021-07-08 NOTE — REVIEW OF SYSTEMS
[Feeling Fatigued] : feeling fatigued [Dyspnea on exertion] : dyspnea during exertion [Joint Pain] : joint pain [Negative] : Respiratory [Fever] : no fever [Headache] : no headache [Chills] : no chills [Blurry Vision] : no blurred vision [Earache] : no earache [Lower Ext Edema] : no extremity edema [Palpitations] : no palpitations [Orthopnea] : no orthopnea [PND] : no PND [Abdominal Pain] : no abdominal pain [Nausea] : no nausea [Vomiting] : no vomiting [Heartburn] : no heartburn [Urinary Frequency] : no change in urinary frequency [Rash] : no rash [Itching] : no itching [Dizziness] : no dizziness [Tremor] : no tremor was seen [Numbness (Hypoesthesia)] : no numbness [Tingling (Paresthesia)] : no tingling [Confusion] : no confusion was observed [Under Stress] : not under stress [Easy Bleeding] : no tendency for easy bleeding [Easy Bruising] : no tendency for easy bruising [FreeTextEntry5] : on and off chest pains.

## 2021-07-08 NOTE — HISTORY OF PRESENT ILLNESS
[FreeTextEntry1] : Bryan Vidal is a 70 year old male with history of CAD S/P stents, hypertension, hypercholesterolemia and diabetes comes for follow up visit. Complaining of on and off random onset of mild chest pains lasts for few seconds to 1 minute and relieved by itself of couple of months duration. No exertional chest pains or shortness of breath. Chronic mild shortness of breath on exertion which is relieved with rest in few minutes. No palpitations. Compliant to medications and diet.

## 2021-07-12 ENCOUNTER — RX RENEWAL (OUTPATIENT)
Age: 71
End: 2021-07-12

## 2021-07-30 ENCOUNTER — OUTPATIENT (OUTPATIENT)
Dept: OUTPATIENT SERVICES | Facility: HOSPITAL | Age: 71
LOS: 1 days | End: 2021-07-30

## 2021-07-30 ENCOUNTER — APPOINTMENT (OUTPATIENT)
Dept: CV DIAGNOSTICS | Facility: HOSPITAL | Age: 71
End: 2021-07-30
Payer: MEDICARE

## 2021-07-30 DIAGNOSIS — E11.9 TYPE 2 DIABETES MELLITUS WITHOUT COMPLICATIONS: ICD-10-CM

## 2021-07-30 PROCEDURE — 93018 CV STRESS TEST I&R ONLY: CPT | Mod: GC

## 2021-07-30 PROCEDURE — 93016 CV STRESS TEST SUPVJ ONLY: CPT | Mod: GC

## 2021-07-30 PROCEDURE — 78452 HT MUSCLE IMAGE SPECT MULT: CPT | Mod: 26

## 2021-08-11 ENCOUNTER — APPOINTMENT (OUTPATIENT)
Dept: OPHTHALMOLOGY | Facility: CLINIC | Age: 71
End: 2021-08-11
Payer: MEDICARE

## 2021-08-11 ENCOUNTER — NON-APPOINTMENT (OUTPATIENT)
Age: 71
End: 2021-08-11

## 2021-08-11 PROCEDURE — 92012 INTRM OPH EXAM EST PATIENT: CPT

## 2021-08-27 ENCOUNTER — APPOINTMENT (OUTPATIENT)
Dept: FAMILY MEDICINE | Facility: CLINIC | Age: 71
End: 2021-08-27

## 2021-09-03 ENCOUNTER — APPOINTMENT (OUTPATIENT)
Dept: OPHTHALMOLOGY | Facility: CLINIC | Age: 71
End: 2021-09-03

## 2021-10-01 ENCOUNTER — NON-APPOINTMENT (OUTPATIENT)
Age: 71
End: 2021-10-01

## 2021-10-01 ENCOUNTER — APPOINTMENT (OUTPATIENT)
Dept: OPHTHALMOLOGY | Facility: CLINIC | Age: 71
End: 2021-10-01
Payer: MEDICARE

## 2021-10-01 PROCEDURE — 92020 GONIOSCOPY: CPT

## 2021-10-01 PROCEDURE — 92012 INTRM OPH EXAM EST PATIENT: CPT

## 2021-10-07 ENCOUNTER — APPOINTMENT (OUTPATIENT)
Dept: CARDIOLOGY | Facility: CLINIC | Age: 71
End: 2021-10-07
Payer: MEDICARE

## 2021-10-07 VITALS
BODY MASS INDEX: 27.4 KG/M2 | WEIGHT: 185 LBS | OXYGEN SATURATION: 98 % | HEART RATE: 64 BPM | DIASTOLIC BLOOD PRESSURE: 54 MMHG | HEIGHT: 69 IN | TEMPERATURE: 97.3 F | SYSTOLIC BLOOD PRESSURE: 96 MMHG

## 2021-10-07 PROCEDURE — 99214 OFFICE O/P EST MOD 30 MIN: CPT

## 2021-10-07 NOTE — REVIEW OF SYSTEMS
[Dyspnea on exertion] : dyspnea during exertion [Joint Pain] : joint pain [Negative] : Respiratory [Fever] : no fever [Headache] : no headache [Chills] : no chills [Feeling Fatigued] : not feeling fatigued [Blurry Vision] : no blurred vision [Earache] : no earache [Chest Discomfort] : no chest discomfort [Lower Ext Edema] : no extremity edema [Palpitations] : no palpitations [Orthopnea] : no orthopnea [PND] : no PND [Abdominal Pain] : no abdominal pain [Nausea] : no nausea [Vomiting] : no vomiting [Heartburn] : no heartburn [Urinary Frequency] : no change in urinary frequency [Rash] : no rash [Itching] : no itching [Dizziness] : no dizziness [Tremor] : no tremor was seen [Numbness (Hypoesthesia)] : no numbness [Tingling (Paresthesia)] : no tingling [Confusion] : no confusion was observed [Under Stress] : not under stress [Easy Bleeding] : no tendency for easy bleeding [Easy Bruising] : no tendency for easy bruising

## 2021-10-07 NOTE — PHYSICAL EXAM
[Normal Conjunctiva] : normal conjunctiva [No Carotid Bruit] : no carotid bruit [Soft] : abdomen soft [Non Tender] : non-tender [Normal Bowel Sounds] : normal bowel sounds [No Edema] : no edema [No Cyanosis] : no cyanosis [Moves all extremities] : moves all extremities [No Focal Deficits] : no focal deficits [Normal Speech] : normal speech [Normal] : alert and oriented, normal memory [de-identified] : walks with cane

## 2021-10-07 NOTE — DISCUSSION/SUMMARY
[FreeTextEntry1] : In a summary Bryan Vidal is an elderly male with CAD s/p stents, stable. Continue Aspirin and Plavix. Hypertension, BP on lower side. Decreased Isosorbide ER to 30 mg once daily. Monitors BP at home and will call me in 1 week with readings. Continue other medications. Hypercholesterolemia, on statins and low cholesterol diet. Diabetes, on medications and low Carb diet. Follow up in 3 months.

## 2021-10-07 NOTE — HISTORY OF PRESENT ILLNESS
[FreeTextEntry1] : Bryan Vidal is a 71 year old male with CAD s/p stents, hypertension, hypercholesterolemia and Diabetes comes for follow up visit. Had Nuclear stress test for chest pains and shortness of breath which was negative for ischemia. Chest pains resolved. Chronic mild on and off shortness of breath on exertion. No palpitations. Complaint to medications and diet.

## 2021-11-12 ENCOUNTER — LABORATORY RESULT (OUTPATIENT)
Age: 71
End: 2021-11-12

## 2021-11-12 ENCOUNTER — APPOINTMENT (OUTPATIENT)
Dept: INTERNAL MEDICINE | Facility: CLINIC | Age: 71
End: 2021-11-12
Payer: MEDICARE

## 2021-11-12 VITALS
DIASTOLIC BLOOD PRESSURE: 71 MMHG | HEIGHT: 69 IN | WEIGHT: 182 LBS | BODY MASS INDEX: 26.96 KG/M2 | OXYGEN SATURATION: 98 % | RESPIRATION RATE: 12 BRPM | HEART RATE: 64 BPM | SYSTOLIC BLOOD PRESSURE: 120 MMHG | TEMPERATURE: 97.7 F

## 2021-11-12 DIAGNOSIS — Z00.00 ENCOUNTER FOR GENERAL ADULT MEDICAL EXAMINATION W/OUT ABNORMAL FINDINGS: ICD-10-CM

## 2021-11-12 DIAGNOSIS — D49.2 NEOPLASM OF UNSPECIFIED BEHAVIOR OF BONE, SOFT TISSUE, AND SKIN: ICD-10-CM

## 2021-11-12 DIAGNOSIS — R22.9 LOCALIZED SWELLING, MASS AND LUMP, UNSPECIFIED: ICD-10-CM

## 2021-11-12 PROCEDURE — 99397 PER PM REEVAL EST PAT 65+ YR: CPT | Mod: GY

## 2021-11-17 PROBLEM — D49.2 SKIN GROWTH: Status: ACTIVE | Noted: 2021-11-12

## 2021-11-17 NOTE — PHYSICAL EXAM
[No Acute Distress] : no acute distress [Well Developed] : well developed [Well Nourished] : well nourished [Well-Appearing] : well-appearing [Normal Sclera/Conjunctiva] : normal sclera/conjunctiva [PERRL] : pupils equal round and reactive to light [Normal Outer Ear/Nose] : the outer ears and nose were normal in appearance [Normal Oropharynx] : the oropharynx was normal [No JVD] : no jugular venous distention [No Lymphadenopathy] : no lymphadenopathy [Supple] : supple [No Respiratory Distress] : no respiratory distress  [No Accessory Muscle Use] : no accessory muscle use [Clear to Auscultation] : lungs were clear to auscultation bilaterally [Normal Rate] : normal rate  [Regular Rhythm] : with a regular rhythm [Normal S1, S2] : normal S1 and S2 [No Murmur] : no murmur heard [No Carotid Bruits] : no carotid bruits [Pedal Pulses Present] : the pedal pulses are present [No Edema] : there was no peripheral edema [Soft] : abdomen soft [Non Tender] : non-tender [Non-distended] : non-distended [No Masses] : no abdominal mass palpated [Normal Bowel Sounds] : normal bowel sounds [Normal Posterior Cervical Nodes] : no posterior cervical lymphadenopathy [Normal Anterior Cervical Nodes] : no anterior cervical lymphadenopathy [No CVA Tenderness] : no CVA  tenderness [No Spinal Tenderness] : no spinal tenderness [No Joint Swelling] : no joint swelling [No Rash] : no rash [Grossly Normal Strength/Tone] : grossly normal strength/tone [No Focal Deficits] : no focal deficits [Normal Affect] : the affect was normal [Deep Tendon Reflexes (DTR)] : deep tendon reflexes were 2+ and symmetric [Normal Insight/Judgement] : insight and judgment were intact [de-identified] : legally blind  [de-identified] : + skin lesion upper back/ + anterior chest and b/l arms lumps ? lipomas / no local tenderness or erythema  [de-identified] : ambulates with cane / slow gait

## 2021-11-17 NOTE — ASSESSMENT
[FreeTextEntry1] : 1. see plan\par 2. DM/ Htn/ Hld\par continue current meds\par following with endo/ cardio

## 2021-11-17 NOTE — REVIEW OF SYSTEMS
[Negative] : Heme/Lymph [FreeTextEntry4] : legally blind [de-identified] : skin lesion on the back [FreeTextEntry1] : lumps anterior chest/ b/l arms

## 2021-11-17 NOTE — HISTORY OF PRESENT ILLNESS
[de-identified] : 71 year old male who is accompanied by his daughter with h/o legally blind / CAD w 4  stents / Htn/ Hld / DM presents for physical exam and form filled out .\par He is monitoring his glucose at home and reports poor glycemic control with  - 230 .\par He is not compliant with diet/ exercise \par He is following with endo for diabetes and cardio for CAD  \par He is c/o skin growth on his upper back for a long time> getting bigger and lumps anterior chest and b/l arms , had lipomas in the past. He denies local pain .\par He denies CP/SOB, dizziness/ abd pain \par Last eye  exam 2021\par colon 10 years ago\par he is fully vaccinated for COVID / Flu \par

## 2021-12-01 ENCOUNTER — APPOINTMENT (OUTPATIENT)
Dept: INTERNAL MEDICINE | Facility: CLINIC | Age: 71
End: 2021-12-01
Payer: MEDICARE

## 2021-12-01 PROCEDURE — 99214 OFFICE O/P EST MOD 30 MIN: CPT | Mod: 95

## 2021-12-01 NOTE — ASSESSMENT
[FreeTextEntry1] : 1. DM , poor control\par following with endo\par cont Basaglar/ Admelog\par was started on Metformin 500 mg QD\par increase hydration\par 2. Elevated creatinine\par increase hydration\par nephrology referral\par follow up 1 month \par 3. CAD/ Htn/ Hld \par cont current meds\par cardio folow up\par 4. Low D\par take D 2000 u/d\par All labs d/w pt

## 2021-12-01 NOTE — HISTORY OF PRESENT ILLNESS
[Home] : at home, [unfilled] , at the time of the visit. [Medical Office: ([facility])___] : at the medical office located in  [de-identified] : 71 year old male who is accompanied by his daughter with h/o legally blind / CAD w 4  stents / Htn/ Hld / DM presents for follow up on lab results.\par Recent blood work reveal elevated Hb A1C 8.5 > pt was seen by endo yesterday  Dr Grier > was started on Metformin 500 mg QD in addition to Basaglar and Admelog injections. Pt also has elevated Tgd > he is not compliant with diet/ exercise \par He is following with  cardio for CAD  \par He denies CP/SOB

## 2021-12-03 ENCOUNTER — APPOINTMENT (OUTPATIENT)
Dept: INTERNAL MEDICINE | Facility: CLINIC | Age: 71
End: 2021-12-03

## 2021-12-08 ENCOUNTER — NON-APPOINTMENT (OUTPATIENT)
Age: 71
End: 2021-12-08

## 2021-12-08 ENCOUNTER — APPOINTMENT (OUTPATIENT)
Dept: OPHTHALMOLOGY | Facility: CLINIC | Age: 71
End: 2021-12-08
Payer: MEDICARE

## 2021-12-08 PROCEDURE — 92012 INTRM OPH EXAM EST PATIENT: CPT

## 2021-12-10 LAB
25(OH)D3 SERPL-MCNC: 22.7 NG/ML
ALBUMIN SERPL ELPH-MCNC: 4.6 G/DL
ALP BLD-CCNC: 38 U/L
ALT SERPL-CCNC: 13 U/L
ANION GAP SERPL CALC-SCNC: 19 MMOL/L
APPEARANCE: CLEAR
AST SERPL-CCNC: 20 U/L
BASOPHILS # BLD AUTO: 0.06 K/UL
BASOPHILS NFR BLD AUTO: 0.7 %
BILIRUB SERPL-MCNC: 0.3 MG/DL
BILIRUBIN URINE: NEGATIVE
BLOOD URINE: NEGATIVE
BUN SERPL-MCNC: 30 MG/DL
CALCIUM SERPL-MCNC: 9.9 MG/DL
CHLORIDE SERPL-SCNC: 102 MMOL/L
CHOLEST SERPL-MCNC: 164 MG/DL
CO2 SERPL-SCNC: 17 MMOL/L
COLOR: ABNORMAL
CREAT SERPL-MCNC: 1.4 MG/DL
EOSINOPHIL # BLD AUTO: 0.35 K/UL
EOSINOPHIL NFR BLD AUTO: 4.3 %
ESTIMATED AVERAGE GLUCOSE: 197 MG/DL
GLUCOSE QUALITATIVE U: ABNORMAL
GLUCOSE SERPL-MCNC: 222 MG/DL
HBA1C MFR BLD HPLC: 8.5 %
HCT VFR BLD CALC: 41.7 %
HDLC SERPL-MCNC: 30 MG/DL
HGB BLD-MCNC: 13.4 G/DL
IMM GRANULOCYTES NFR BLD AUTO: 0.2 %
KETONES URINE: NEGATIVE
LDLC SERPL CALC-MCNC: 89 MG/DL
LEUKOCYTE ESTERASE URINE: NEGATIVE
LYMPHOCYTES # BLD AUTO: 3.68 K/UL
LYMPHOCYTES NFR BLD AUTO: 44.8 %
MAN DIFF?: NORMAL
MCHC RBC-ENTMCNC: 27.5 PG
MCHC RBC-ENTMCNC: 32.1 GM/DL
MCV RBC AUTO: 85.5 FL
MONOCYTES # BLD AUTO: 0.7 K/UL
MONOCYTES NFR BLD AUTO: 8.5 %
NEUTROPHILS # BLD AUTO: 3.4 K/UL
NEUTROPHILS NFR BLD AUTO: 41.5 %
NITRITE URINE: NEGATIVE
NONHDLC SERPL-MCNC: 134 MG/DL
PH URINE: 5
PLATELET # BLD AUTO: 226 K/UL
POTASSIUM SERPL-SCNC: 4.2 MMOL/L
PROT SERPL-MCNC: 7.8 G/DL
PROTEIN URINE: NEGATIVE
PSA SERPL-MCNC: 0.46 NG/ML
RBC # BLD: 4.88 M/UL
RBC # FLD: 14.6 %
SODIUM SERPL-SCNC: 138 MMOL/L
SPECIFIC GRAVITY URINE: 1.02
TRIGL SERPL-MCNC: 225 MG/DL
TSH SERPL-ACNC: 0.74 UIU/ML
UROBILINOGEN URINE: NORMAL
VIT B12 SERPL-MCNC: 745 PG/ML
WBC # FLD AUTO: 8.21 K/UL

## 2021-12-15 NOTE — SWALLOW BEDSIDE ASSESSMENT ADULT - SWALLOW EVAL: MANDIBULAR STRENGTH AND MOBILITY
Called patient to follow up regarding the matter, no answer, voicemail is full unable to leave voicemail. Will try again later.     Celeste Jc CMA    intact

## 2022-01-06 ENCOUNTER — NON-APPOINTMENT (OUTPATIENT)
Age: 72
End: 2022-01-06

## 2022-01-06 ENCOUNTER — APPOINTMENT (OUTPATIENT)
Dept: CARDIOLOGY | Facility: CLINIC | Age: 72
End: 2022-01-06
Payer: MEDICARE

## 2022-01-06 VITALS
HEIGHT: 69 IN | HEART RATE: 53 BPM | DIASTOLIC BLOOD PRESSURE: 74 MMHG | SYSTOLIC BLOOD PRESSURE: 144 MMHG | WEIGHT: 181 LBS | OXYGEN SATURATION: 99 % | BODY MASS INDEX: 26.81 KG/M2 | TEMPERATURE: 97.7 F

## 2022-01-06 VITALS — DIASTOLIC BLOOD PRESSURE: 76 MMHG | SYSTOLIC BLOOD PRESSURE: 138 MMHG

## 2022-01-06 PROCEDURE — 99214 OFFICE O/P EST MOD 30 MIN: CPT

## 2022-01-06 PROCEDURE — 93000 ELECTROCARDIOGRAM COMPLETE: CPT

## 2022-01-06 RX ORDER — ISOSORBIDE MONONITRATE 60 MG/1
60 TABLET, EXTENDED RELEASE ORAL DAILY
Qty: 90 | Refills: 1 | Status: DISCONTINUED | COMMUNITY
Start: 2021-03-10 | End: 2022-01-06

## 2022-01-06 NOTE — DISCUSSION/SUMMARY
[FreeTextEntry1] : In a summary Bryan Vidal is an elderly male with CAD s/p stents, stable. Continue Aspirin and Plavix. Hypertension, BP is better after  Decreasing Isosorbide ER to 30 mg once daily. Monitors BP at home and its normal. Continue other medications. Hypercholesterolemia, on statins and low cholesterol diet. Diabetes, on medications and low Carb diet. Follow up in 3 months.

## 2022-01-06 NOTE — PHYSICAL EXAM
[Normal Conjunctiva] : normal conjunctiva [No Carotid Bruit] : no carotid bruit [Soft] : abdomen soft [Non Tender] : non-tender [Normal Bowel Sounds] : normal bowel sounds [No Edema] : no edema [No Cyanosis] : no cyanosis [Moves all extremities] : moves all extremities [No Focal Deficits] : no focal deficits [Normal Speech] : normal speech [Normal] : alert and oriented, normal memory [de-identified] : walks with cane

## 2022-01-06 NOTE — HISTORY OF PRESENT ILLNESS
[FreeTextEntry1] : Bryan Vidal is a 71 year old male with CAD s/p stents, hypertension, hypercholesterolemia and Diabetes comes for follow up visit.  Chest pains resolved. Chronic mild on and off shortness of breath on exertion which is relieved with rest in few minutes and unchanged. No palpitations. Imdur dose decreased to 30 mg once daily last visit as BP was on lower side. Complaint to medications and diet.

## 2022-01-11 ENCOUNTER — APPOINTMENT (OUTPATIENT)
Dept: GASTROENTEROLOGY | Facility: CLINIC | Age: 72
End: 2022-01-11
Payer: MEDICARE

## 2022-01-11 VITALS
HEART RATE: 60 BPM | DIASTOLIC BLOOD PRESSURE: 69 MMHG | HEIGHT: 69 IN | TEMPERATURE: 97.4 F | SYSTOLIC BLOOD PRESSURE: 113 MMHG | BODY MASS INDEX: 26.36 KG/M2 | OXYGEN SATURATION: 98 % | WEIGHT: 178 LBS

## 2022-01-11 DIAGNOSIS — Z01.812 ENCOUNTER FOR PREPROCEDURAL LABORATORY EXAMINATION: ICD-10-CM

## 2022-01-11 DIAGNOSIS — Z20.822 ENCOUNTER FOR PREPROCEDURAL LABORATORY EXAMINATION: ICD-10-CM

## 2022-01-11 DIAGNOSIS — Z20.822 CONTACT WITH AND (SUSPECTED) EXPOSURE TO COVID-19: ICD-10-CM

## 2022-01-11 DIAGNOSIS — Z12.11 ENCOUNTER FOR SCREENING FOR MALIGNANT NEOPLASM OF COLON: ICD-10-CM

## 2022-01-11 PROCEDURE — 99204 OFFICE O/P NEW MOD 45 MIN: CPT

## 2022-01-11 RX ORDER — INSULIN LISPRO 100 U/ML
100 INJECTION, SOLUTION SUBCUTANEOUS
Qty: 3 | Refills: 3 | Status: DISCONTINUED | COMMUNITY
End: 2022-01-11

## 2022-01-11 NOTE — HISTORY OF PRESENT ILLNESS
[Heartburn] : denies heartburn [Nausea] : denies nausea [Vomiting] : denies vomiting [Diarrhea] : denies diarrhea [Constipation] : denies constipation [Yellow Skin Or Eyes (Jaundice)] : denies jaundice [Abdominal Pain] : denies abdominal pain [Abdominal Swelling] : denies abdominal swelling [Rectal Pain] : denies rectal pain [GERD] : no gastroesophageal reflux disease [Hiatus Hernia] : no hiatus hernia [Peptic Ulcer Disease] : no peptic ulcer disease [Pancreatitis] : no pancreatitis [Cholelithiasis] : no cholelithiasis [Kidney Stone] : no kidney stone [Inflammatory Bowel Disease] : no inflammatory bowel disease [Irritable Bowel Syndrome] : no irritable bowel syndrome [Diverticulitis] : no diverticulitis [Malignancy] : no malignancy [Abdominal Surgery] : no abdominal surgery [Appendectomy] : no appendectomy [Cholecystectomy] : no cholecystectomy [de-identified] : 71 year old male presents for screening colonoscopy. Last colonoscopy was over 10 years ago preformed elsewhere as per patient normal. Bowel movements are daily and regular without difficulty or strain.  Patient referred by PCP Manuel for colonoscopy. Denies rectal bleeding, blood in the stool, melena, or hematemesis. Patient with history of CAD s/p stents, hypertension, hypercholesterolemia and Diabetes. 4 Stents placed as per patient 7 years ago followed and treated by cardiologist Dr Camejo.

## 2022-01-11 NOTE — PHYSICAL EXAM
[General Appearance - Alert] : alert [General Appearance - In No Acute Distress] : in no acute distress [Sclera] : the sclera and conjunctiva were normal [PERRL With Normal Accommodation] : pupils were equal in size, round, and reactive to light [Extraocular Movements] : extraocular movements were intact [Outer Ear] : the ears and nose were normal in appearance [Oropharynx] : the oropharynx was normal [Neck Appearance] : the appearance of the neck was normal [Neck Cervical Mass (___cm)] : no neck mass was observed [Jugular Venous Distention Increased] : there was no jugular-venous distention [Thyroid Diffuse Enlargement] : the thyroid was not enlarged [Thyroid Nodule] : there were no palpable thyroid nodules [] : no respiratory distress [Auscultation Breath Sounds / Voice Sounds] : lungs were clear to auscultation bilaterally [Heart Rate And Rhythm] : heart rate was normal and rhythm regular [Heart Sounds] : normal S1 and S2 [Heart Sounds Gallop] : no gallops [Murmurs] : no murmurs [Heart Sounds Pericardial Friction Rub] : no pericardial rub [Obese] : obese [Soft, Nontender] : the abdomen was soft and nontender [Normal] : normal to percussion [Cervical Lymph Nodes Enlarged Posterior Bilaterally] : posterior cervical [Cervical Lymph Nodes Enlarged Anterior Bilaterally] : anterior cervical [Supraclavicular Lymph Nodes Enlarged Bilaterally] : supraclavicular [Axillary Lymph Nodes Enlarged Bilaterally] : axillary [Femoral Lymph Nodes Enlarged Bilaterally] : femoral [Inguinal Lymph Nodes Enlarged Bilaterally] : inguinal [No CVA Tenderness] : no ~M costovertebral angle tenderness [No Spinal Tenderness] : no spinal tenderness [Abnormal Walk] : normal gait [Nail Clubbing] : no clubbing  or cyanosis of the fingernails [Musculoskeletal - Swelling] : no joint swelling seen [Motor Tone] : muscle strength and tone were normal [Deep Tendon Reflexes (DTR)] : deep tendon reflexes were 2+ and symmetric [Sensation] : the sensory exam was normal to light touch and pinprick [No Focal Deficits] : no focal deficits [Oriented To Time, Place, And Person] : oriented to person, place, and time [Impaired Insight] : insight and judgment were intact [Affect] : the affect was normal [Firm] : not firm [Rigid] : not rigid [Rebound] : no rebound [Guarding] : no guarding [Oscar's] : a negative Oscar's sign

## 2022-01-11 NOTE — ASSESSMENT
[FreeTextEntry1] : Attending Attestation\par \par Screening Colonoscopy \par \par Colonoscopy procedure ordered The risks benefits alternatives and complications of the procedure/s were explained to the patient at length. The patient was agreeable and we will proceed. Preparation discussed reviewed side effects and adverse reactions to prep. \par \par Patient made aware he will need to have cardiac clearance prior to procedure. \par \par Time spent with patient 45 min \par \par Patient verbalized understanding of all information provided. All questions answered and reviewed.

## 2022-01-21 ENCOUNTER — APPOINTMENT (OUTPATIENT)
Dept: FAMILY MEDICINE | Facility: CLINIC | Age: 72
End: 2022-01-21
Payer: MEDICARE

## 2022-01-21 ENCOUNTER — APPOINTMENT (OUTPATIENT)
Dept: FAMILY MEDICINE | Facility: CLINIC | Age: 72
End: 2022-01-21

## 2022-01-21 VITALS
BODY MASS INDEX: 26.22 KG/M2 | HEIGHT: 69 IN | WEIGHT: 177 LBS | DIASTOLIC BLOOD PRESSURE: 58 MMHG | SYSTOLIC BLOOD PRESSURE: 102 MMHG | HEART RATE: 61 BPM | OXYGEN SATURATION: 97 % | TEMPERATURE: 97.3 F

## 2022-01-21 PROCEDURE — 99214 OFFICE O/P EST MOD 30 MIN: CPT | Mod: 25

## 2022-01-21 PROCEDURE — 36415 COLL VENOUS BLD VENIPUNCTURE: CPT

## 2022-01-21 NOTE — HISTORY OF PRESENT ILLNESS
[FreeTextEntry1] : f/u chronic conditions [de-identified] : 47 yo M with DM, HTN, HLD, CAD, glaucoma presents for f/u chronic conditions.\par \par sees outside endo-- Dec 2021, jardiance added to metformin, basaglar, novolog sliding scale. a1c 8.6 in Dec. Eats few slices of 12 grain bread in AM. He is lower carb for dinner. \par \par BPs tend to be lower in AMs. He sometimes takes half isosorbide in AM and half in PM. Checks multiple times a day, 120s-130s systolic in PMs at home. Pt reports he has been taking furosemide at night and therefore urinates middle of the night. Advised to take during day but keep eye on BPs. May need to adjust med times/doses based on BPs. Pt to call office in 1 week or sooner if BPs still low.

## 2022-01-21 NOTE — PHYSICAL EXAM
[No Edema] : there was no peripheral edema [Normal] : affect was normal and insight and judgment were intact [de-identified] : small lipomas chest, arms. [de-identified] : uses cane

## 2022-01-24 ENCOUNTER — NON-APPOINTMENT (OUTPATIENT)
Age: 72
End: 2022-01-24

## 2022-01-24 LAB
ALBUMIN SERPL ELPH-MCNC: 5 G/DL
ALP BLD-CCNC: 42 U/L
ALT SERPL-CCNC: 10 U/L
ANION GAP SERPL CALC-SCNC: 13 MMOL/L
AST SERPL-CCNC: 15 U/L
BILIRUB SERPL-MCNC: 0.4 MG/DL
BUN SERPL-MCNC: 31 MG/DL
CALCIUM SERPL-MCNC: 10.1 MG/DL
CHLORIDE SERPL-SCNC: 105 MMOL/L
CHOLEST SERPL-MCNC: 161 MG/DL
CO2 SERPL-SCNC: 25 MMOL/L
CREAT SERPL-MCNC: 1.71 MG/DL
CREAT SPEC-SCNC: 113 MG/DL
GLUCOSE SERPL-MCNC: 159 MG/DL
HDLC SERPL-MCNC: 31 MG/DL
LDLC SERPL CALC-MCNC: 82 MG/DL
MICROALBUMIN 24H UR DL<=1MG/L-MCNC: <1.2 MG/DL
MICROALBUMIN/CREAT 24H UR-RTO: NORMAL MG/G
NONHDLC SERPL-MCNC: 130 MG/DL
POTASSIUM SERPL-SCNC: 4.3 MMOL/L
PROT SERPL-MCNC: 7.8 G/DL
SODIUM SERPL-SCNC: 144 MMOL/L
TRIGL SERPL-MCNC: 242 MG/DL

## 2022-01-24 RX ORDER — METFORMIN ER 500 MG 500 MG/1
500 TABLET ORAL
Qty: 90 | Refills: 0 | Status: COMPLETED | COMMUNITY
Start: 2021-10-29

## 2022-01-24 RX ORDER — EMPAGLIFLOZIN, METFORMIN HYDROCHLORIDE 12.5; 1 MG/1; MG/1
12.5-1 TABLET, EXTENDED RELEASE ORAL
Qty: 60 | Refills: 0 | Status: COMPLETED | COMMUNITY
Start: 2022-01-11

## 2022-01-24 RX ORDER — EMPAGLIFLOZIN 10 MG/1
10 TABLET, FILM COATED ORAL
Qty: 90 | Refills: 0 | Status: COMPLETED | COMMUNITY
Start: 2021-10-29

## 2022-02-04 ENCOUNTER — NON-APPOINTMENT (OUTPATIENT)
Age: 72
End: 2022-02-04

## 2022-02-04 ENCOUNTER — APPOINTMENT (OUTPATIENT)
Dept: OPHTHALMOLOGY | Facility: CLINIC | Age: 72
End: 2022-02-04
Payer: MEDICARE

## 2022-02-04 PROCEDURE — 92012 INTRM OPH EXAM EST PATIENT: CPT

## 2022-03-11 ENCOUNTER — APPOINTMENT (OUTPATIENT)
Dept: CARDIOLOGY | Facility: CLINIC | Age: 72
End: 2022-03-11
Payer: MEDICARE

## 2022-03-11 ENCOUNTER — NON-APPOINTMENT (OUTPATIENT)
Age: 72
End: 2022-03-11

## 2022-03-11 VITALS
HEIGHT: 69 IN | SYSTOLIC BLOOD PRESSURE: 186 MMHG | WEIGHT: 178 LBS | DIASTOLIC BLOOD PRESSURE: 88 MMHG | HEART RATE: 65 BPM | TEMPERATURE: 97.4 F | OXYGEN SATURATION: 96 % | BODY MASS INDEX: 26.36 KG/M2

## 2022-03-11 VITALS — SYSTOLIC BLOOD PRESSURE: 166 MMHG | DIASTOLIC BLOOD PRESSURE: 79 MMHG

## 2022-03-11 DIAGNOSIS — Z01.810 ENCOUNTER FOR PREPROCEDURAL CARDIOVASCULAR EXAMINATION: ICD-10-CM

## 2022-03-11 PROCEDURE — 93000 ELECTROCARDIOGRAM COMPLETE: CPT | Mod: NC

## 2022-03-11 PROCEDURE — 99214 OFFICE O/P EST MOD 30 MIN: CPT

## 2022-03-11 NOTE — PHYSICAL EXAM
[Normal Conjunctiva] : normal conjunctiva [No Carotid Bruit] : no carotid bruit [Soft] : abdomen soft [Non Tender] : non-tender [Normal Bowel Sounds] : normal bowel sounds [No Edema] : no edema [No Cyanosis] : no cyanosis [Moves all extremities] : moves all extremities [No Focal Deficits] : no focal deficits [Normal Speech] : normal speech [Normal] : alert and oriented, normal memory [de-identified] : walks with cane

## 2022-03-11 NOTE — HISTORY OF PRESENT ILLNESS
[FreeTextEntry1] : Bryan Vidal is a 71 year old male with CAD s/p stents, hypertension, hypercholesterolemia and Diabetes comes for pre- procedural cardiac evaluation for colonoscopy. Denies any chest pain or palpitations. No shortness of breath on exertion .  Complaint to medications and diet.

## 2022-03-11 NOTE — DISCUSSION/SUMMARY
[FreeTextEntry1] : In a summary Bryan Vidal is an elderly male with CAD s/p stents, stable. Continue Plavix. Hypertension, BP is high today.  Monitors BP at home and its normal. Continue Metoprolol, Losartan, Isosorbide and Lasix. Cut down on salt intake. If BP continues to be high will adjust medications. Hypercholesterolemia, continue Simvastatin  and low cholesterol diet. LDL goal lesS than 70 g/dL.Diabetes, on medications and low Carb diet. Pre colonoscopy, can stop Plavix 5 days prior to procedure and restart as soon as possible. Will get Echo to assess LV systolic function and wall motion. Follow up in 3 months.

## 2022-03-11 NOTE — REVIEW OF SYSTEMS
[Joint Pain] : joint pain [Negative] : Respiratory [Fever] : no fever [Headache] : no headache [Chills] : no chills [Feeling Fatigued] : not feeling fatigued [Blurry Vision] : no blurred vision [Earache] : no earache [Dyspnea on exertion] : not dyspnea during exertion [Chest Discomfort] : no chest discomfort [Lower Ext Edema] : no extremity edema [Palpitations] : no palpitations [Orthopnea] : no orthopnea [PND] : no PND [Abdominal Pain] : no abdominal pain [Nausea] : no nausea [Vomiting] : no vomiting [Heartburn] : no heartburn [Urinary Frequency] : no change in urinary frequency [Rash] : no rash [Itching] : no itching [Dizziness] : no dizziness [Tremor] : no tremor was seen [Numbness (Hypoesthesia)] : no numbness [Tingling (Paresthesia)] : no tingling [Confusion] : no confusion was observed [Under Stress] : not under stress [Easy Bleeding] : no tendency for easy bleeding [Easy Bruising] : no tendency for easy bruising

## 2022-03-15 ENCOUNTER — APPOINTMENT (OUTPATIENT)
Dept: GASTROENTEROLOGY | Facility: CLINIC | Age: 72
End: 2022-03-15

## 2022-03-16 LAB — SARS-COV-2 N GENE NPH QL NAA+PROBE: NOT DETECTED

## 2022-03-18 ENCOUNTER — RESULT REVIEW (OUTPATIENT)
Age: 72
End: 2022-03-18

## 2022-03-18 ENCOUNTER — APPOINTMENT (OUTPATIENT)
Dept: GASTROENTEROLOGY | Facility: HOSPITAL | Age: 72
End: 2022-03-18
Payer: MEDICARE

## 2022-03-18 ENCOUNTER — OUTPATIENT (OUTPATIENT)
Dept: OUTPATIENT SERVICES | Facility: HOSPITAL | Age: 72
LOS: 1 days | Discharge: HOME | End: 2022-03-18
Payer: MEDICARE

## 2022-03-18 VITALS
RESPIRATION RATE: 16 BRPM | OXYGEN SATURATION: 97 % | HEART RATE: 54 BPM | TEMPERATURE: 98 F | WEIGHT: 175.05 LBS | DIASTOLIC BLOOD PRESSURE: 77 MMHG | HEIGHT: 69 IN | SYSTOLIC BLOOD PRESSURE: 155 MMHG

## 2022-03-18 VITALS
HEART RATE: 60 BPM | OXYGEN SATURATION: 98 % | DIASTOLIC BLOOD PRESSURE: 64 MMHG | RESPIRATION RATE: 14 BRPM | SYSTOLIC BLOOD PRESSURE: 125 MMHG

## 2022-03-18 DIAGNOSIS — R19.7 DIARRHEA, UNSPECIFIED: ICD-10-CM

## 2022-03-18 LAB — GLUCOSE BLDC GLUCOMTR-MCNC: 104 MG/DL — HIGH (ref 70–99)

## 2022-03-18 PROCEDURE — 45380 COLONOSCOPY AND BIOPSY: CPT | Mod: PT

## 2022-03-18 PROCEDURE — 88305 TISSUE EXAM BY PATHOLOGIST: CPT | Mod: 26

## 2022-03-18 RX ORDER — SODIUM CHLORIDE 9 MG/ML
1000 INJECTION INTRAMUSCULAR; INTRAVENOUS; SUBCUTANEOUS
Refills: 0 | Status: DISCONTINUED | OUTPATIENT
Start: 2022-03-18 | End: 2022-04-01

## 2022-03-18 NOTE — ASU PATIENT PROFILE, ADULT - FALL HARM RISK - UNIVERSAL INTERVENTIONS
Bed in lowest position, wheels locked, appropriate side rails in place/Call bell, personal items and telephone in reach/Instruct patient to call for assistance before getting out of bed or chair/Non-slip footwear when patient is out of bed/[place] to call system/Physically safe environment - no spills, clutter or unnecessary equipment/Purposeful Proactive Rounding/Room/bathroom lighting operational, light cord in reach

## 2022-03-22 LAB — SURGICAL PATHOLOGY STUDY: SIGNIFICANT CHANGE UP

## 2022-04-05 ENCOUNTER — RX RENEWAL (OUTPATIENT)
Age: 72
End: 2022-04-05

## 2022-05-06 ENCOUNTER — NON-APPOINTMENT (OUTPATIENT)
Age: 72
End: 2022-05-06

## 2022-05-06 ENCOUNTER — APPOINTMENT (OUTPATIENT)
Dept: OPHTHALMOLOGY | Facility: CLINIC | Age: 72
End: 2022-05-06
Payer: MEDICARE

## 2022-05-06 PROCEDURE — 92014 COMPRE OPH EXAM EST PT 1/>: CPT

## 2022-05-20 ENCOUNTER — APPOINTMENT (OUTPATIENT)
Dept: OPHTHALMOLOGY | Facility: CLINIC | Age: 72
End: 2022-05-20
Payer: MEDICARE

## 2022-05-20 ENCOUNTER — NON-APPOINTMENT (OUTPATIENT)
Age: 72
End: 2022-05-20

## 2022-05-20 PROCEDURE — 92012 INTRM OPH EXAM EST PATIENT: CPT

## 2022-05-27 ENCOUNTER — APPOINTMENT (OUTPATIENT)
Dept: FAMILY MEDICINE | Facility: CLINIC | Age: 72
End: 2022-05-27
Payer: MEDICARE

## 2022-05-27 VITALS
SYSTOLIC BLOOD PRESSURE: 152 MMHG | OXYGEN SATURATION: 99 % | HEART RATE: 57 BPM | BODY MASS INDEX: 26.22 KG/M2 | TEMPERATURE: 97.3 F | HEIGHT: 69 IN | WEIGHT: 177 LBS | DIASTOLIC BLOOD PRESSURE: 72 MMHG

## 2022-05-27 DIAGNOSIS — E55.9 VITAMIN D DEFICIENCY, UNSPECIFIED: ICD-10-CM

## 2022-05-27 PROCEDURE — 99214 OFFICE O/P EST MOD 30 MIN: CPT

## 2022-05-27 NOTE — HISTORY OF PRESENT ILLNESS
[FreeTextEntry1] : f/u [de-identified] : 72 yo M with DM, HTN, HLD presents for f/u.\par \par Basaglar-- 28 units at night, novolin 5 units 3 times a day. BS range from 90s-140s fasting. \par \par BP-- notices vision not good with low BP. When it is slightly higher, he can see better. 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 \par \par

## 2022-05-31 LAB
25(OH)D3 SERPL-MCNC: 19.5 NG/ML
ALBUMIN SERPL ELPH-MCNC: 4.8 G/DL
ALP BLD-CCNC: 41 U/L
ALT SERPL-CCNC: 11 U/L
ANION GAP SERPL CALC-SCNC: 16 MMOL/L
AST SERPL-CCNC: 18 U/L
BASOPHILS # BLD AUTO: 0.06 K/UL
BASOPHILS NFR BLD AUTO: 1 %
BILIRUB SERPL-MCNC: 0.4 MG/DL
BUN SERPL-MCNC: 23 MG/DL
CALCIUM SERPL-MCNC: 10 MG/DL
CHLORIDE SERPL-SCNC: 106 MMOL/L
CHOLEST SERPL-MCNC: 188 MG/DL
CO2 SERPL-SCNC: 21 MMOL/L
CREAT SERPL-MCNC: 1.31 MG/DL
CREAT SPEC-SCNC: 23 MG/DL
EGFR: 57 ML/MIN/1.73M2
EOSINOPHIL # BLD AUTO: 0.24 K/UL
EOSINOPHIL NFR BLD AUTO: 3.8 %
ESTIMATED AVERAGE GLUCOSE: 200 MG/DL
GLUCOSE SERPL-MCNC: 178 MG/DL
HBA1C MFR BLD HPLC: 8.6 %
HCT VFR BLD CALC: 48.1 %
HDLC SERPL-MCNC: 31 MG/DL
HGB BLD-MCNC: 14.6 G/DL
IMM GRANULOCYTES NFR BLD AUTO: 0.2 %
LDLC SERPL CALC-MCNC: 86 MG/DL
LYMPHOCYTES # BLD AUTO: 2.84 K/UL
LYMPHOCYTES NFR BLD AUTO: 45.2 %
MAN DIFF?: NORMAL
MCHC RBC-ENTMCNC: 26.9 PG
MCHC RBC-ENTMCNC: 30.4 GM/DL
MCV RBC AUTO: 88.7 FL
MICROALBUMIN 24H UR DL<=1MG/L-MCNC: <1.2 MG/DL
MICROALBUMIN/CREAT 24H UR-RTO: NORMAL MG/G
MONOCYTES # BLD AUTO: 0.49 K/UL
MONOCYTES NFR BLD AUTO: 7.8 %
NEUTROPHILS # BLD AUTO: 2.65 K/UL
NEUTROPHILS NFR BLD AUTO: 42 %
NONHDLC SERPL-MCNC: 158 MG/DL
PLATELET # BLD AUTO: 201 K/UL
POTASSIUM SERPL-SCNC: 4.5 MMOL/L
PROT SERPL-MCNC: 7.5 G/DL
RBC # BLD: 5.42 M/UL
RBC # FLD: 15.2 %
SODIUM SERPL-SCNC: 144 MMOL/L
TRIGL SERPL-MCNC: 356 MG/DL
TSH SERPL-ACNC: 0.45 UIU/ML
VIT B12 SERPL-MCNC: 842 PG/ML
WBC # FLD AUTO: 6.29 K/UL

## 2022-06-14 ENCOUNTER — APPOINTMENT (OUTPATIENT)
Dept: OPHTHALMOLOGY | Facility: CLINIC | Age: 72
End: 2022-06-14

## 2022-06-30 NOTE — ED PROVIDER NOTE - TOBACCO USE
If you're having a serious, acute crisis and just feel that you can't manage, especially if you're thinking of hurting or killing yourself, please go to the [facility]. They have a mental health evaluation and short term treatment unit called [facility], that is excellent, and can really help if you're having a mental health emergency.    [address], [place] [address]   [phone]     Unknown if ever smoked

## 2022-07-12 ENCOUNTER — NON-APPOINTMENT (OUTPATIENT)
Age: 72
End: 2022-07-12

## 2022-07-12 ENCOUNTER — APPOINTMENT (OUTPATIENT)
Dept: CARDIOLOGY | Facility: CLINIC | Age: 72
End: 2022-07-12

## 2022-07-12 VITALS
OXYGEN SATURATION: 97 % | HEART RATE: 64 BPM | DIASTOLIC BLOOD PRESSURE: 58 MMHG | BODY MASS INDEX: 26.51 KG/M2 | HEIGHT: 69 IN | WEIGHT: 179 LBS | SYSTOLIC BLOOD PRESSURE: 118 MMHG

## 2022-07-12 PROCEDURE — 99214 OFFICE O/P EST MOD 30 MIN: CPT | Mod: 25

## 2022-07-12 PROCEDURE — 93000 ELECTROCARDIOGRAM COMPLETE: CPT

## 2022-07-12 NOTE — PHYSICAL EXAM
[Normal Conjunctiva] : normal conjunctiva [No Carotid Bruit] : no carotid bruit [Soft] : abdomen soft [Non Tender] : non-tender [Normal Bowel Sounds] : normal bowel sounds [No Edema] : no edema [No Cyanosis] : no cyanosis [Moves all extremities] : moves all extremities [No Focal Deficits] : no focal deficits [Normal Speech] : normal speech [Normal] : alert and oriented, normal memory [de-identified] : walks with cane

## 2022-07-12 NOTE — DISCUSSION/SUMMARY
[FreeTextEntry1] : In a summary Bryan Vidal is an elderly male with CAD s/p stents, stable. Continue Plavix. Hypertension, controlled. Continue Metoprolol, Losartan, Isosorbide and Lasix.  Hypercholesterolemia, continue Simvastatin  and low cholesterol diet. LDL goal lesS than 70 g/dL.Diabetes, on medications and low Carb diet.  Shortness of breath on exertion, Echo was denied by Insurance.  Follow up in 6 months.

## 2022-07-12 NOTE — HISTORY OF PRESENT ILLNESS
[FreeTextEntry1] : Bryan Vidal is a 71 year old male with CAD s/p stents, hypertension, hypercholesterolemia and Diabetes comes for follow up visit. Denies any chest pain or palpitations. Mild  shortness of breath on exertion which is relieved with rest in few minutes of few months duration .  Complaint to medications and diet. Walks with cane.

## 2022-07-26 ENCOUNTER — APPOINTMENT (OUTPATIENT)
Dept: CARDIOLOGY | Facility: CLINIC | Age: 72
End: 2022-07-26

## 2022-07-26 PROCEDURE — 93306 TTE W/DOPPLER COMPLETE: CPT

## 2022-07-29 ENCOUNTER — APPOINTMENT (OUTPATIENT)
Dept: OPHTHALMOLOGY | Facility: CLINIC | Age: 72
End: 2022-07-29

## 2022-07-29 ENCOUNTER — NON-APPOINTMENT (OUTPATIENT)
Age: 72
End: 2022-07-29

## 2022-07-29 PROCEDURE — 92081 LIMITED VISUAL FIELD XM: CPT

## 2022-07-29 PROCEDURE — 92012 INTRM OPH EXAM EST PATIENT: CPT

## 2022-08-26 ENCOUNTER — APPOINTMENT (OUTPATIENT)
Dept: FAMILY MEDICINE | Facility: CLINIC | Age: 72
End: 2022-08-26

## 2022-08-26 ENCOUNTER — APPOINTMENT (OUTPATIENT)
Dept: CARDIOLOGY | Facility: CLINIC | Age: 72
End: 2022-08-26

## 2022-08-26 VITALS
WEIGHT: 173 LBS | OXYGEN SATURATION: 96 % | HEART RATE: 62 BPM | HEIGHT: 69 IN | BODY MASS INDEX: 25.62 KG/M2 | TEMPERATURE: 98.3 F | SYSTOLIC BLOOD PRESSURE: 103 MMHG | DIASTOLIC BLOOD PRESSURE: 64 MMHG

## 2022-08-26 DIAGNOSIS — Z23 ENCOUNTER FOR IMMUNIZATION: ICD-10-CM

## 2022-08-26 PROCEDURE — 90677 PCV20 VACCINE IM: CPT

## 2022-08-26 PROCEDURE — 90471 IMMUNIZATION ADMIN: CPT

## 2022-08-26 PROCEDURE — 99214 OFFICE O/P EST MOD 30 MIN: CPT | Mod: 25

## 2022-08-26 NOTE — HISTORY OF PRESENT ILLNESS
[Family Member] : family member [FreeTextEntry1] : DM, HLD, HTN [de-identified] : 72 yo M with DM, HLD, HTN, CAD\par \par DM-- Fasting BS have been 110s, 90s. \par \par HTN-- BPs on lower side, does get dizzy sometimes when walking and when getting up too fast. Did not take isosorbide this morning.\par Denies lower extremity swelling

## 2022-08-30 LAB
25(OH)D3 SERPL-MCNC: 40 NG/ML
ALBUMIN SERPL ELPH-MCNC: 4.7 G/DL
ALP BLD-CCNC: 38 U/L
ALT SERPL-CCNC: 8 U/L
ANION GAP SERPL CALC-SCNC: 15 MMOL/L
AST SERPL-CCNC: 16 U/L
BILIRUB SERPL-MCNC: 0.3 MG/DL
BUN SERPL-MCNC: 28 MG/DL
CALCIUM SERPL-MCNC: 9.8 MG/DL
CHLORIDE SERPL-SCNC: 107 MMOL/L
CHOLEST SERPL-MCNC: 198 MG/DL
CO2 SERPL-SCNC: 21 MMOL/L
CREAT SERPL-MCNC: 1.49 MG/DL
EGFR: 49 ML/MIN/1.73M2
ESTIMATED AVERAGE GLUCOSE: 166 MG/DL
GLUCOSE SERPL-MCNC: 103 MG/DL
HBA1C MFR BLD HPLC: 7.4 %
HDLC SERPL-MCNC: 31 MG/DL
LDLC SERPL CALC-MCNC: 88 MG/DL
NONHDLC SERPL-MCNC: 166 MG/DL
POTASSIUM SERPL-SCNC: 4 MMOL/L
PROT SERPL-MCNC: 7.5 G/DL
SODIUM SERPL-SCNC: 143 MMOL/L
TRIGL SERPL-MCNC: 390 MG/DL

## 2022-09-01 ENCOUNTER — NON-APPOINTMENT (OUTPATIENT)
Age: 72
End: 2022-09-01

## 2022-09-06 ENCOUNTER — APPOINTMENT (OUTPATIENT)
Dept: OPHTHALMOLOGY | Facility: CLINIC | Age: 72
End: 2022-09-06

## 2022-10-11 ENCOUNTER — APPOINTMENT (OUTPATIENT)
Dept: OPHTHALMOLOGY | Facility: CLINIC | Age: 72
End: 2022-10-11

## 2022-10-11 ENCOUNTER — NON-APPOINTMENT (OUTPATIENT)
Age: 72
End: 2022-10-11

## 2022-10-11 PROCEDURE — 92012 INTRM OPH EXAM EST PATIENT: CPT

## 2022-10-12 ENCOUNTER — NON-APPOINTMENT (OUTPATIENT)
Age: 72
End: 2022-10-12

## 2022-10-12 ENCOUNTER — APPOINTMENT (OUTPATIENT)
Dept: OPHTHALMOLOGY | Facility: CLINIC | Age: 72
End: 2022-10-12

## 2022-10-12 PROCEDURE — 92012 INTRM OPH EXAM EST PATIENT: CPT

## 2022-11-03 ENCOUNTER — APPOINTMENT (OUTPATIENT)
Dept: OPHTHALMOLOGY | Facility: CLINIC | Age: 72
End: 2022-11-03

## 2022-11-04 ENCOUNTER — NON-APPOINTMENT (OUTPATIENT)
Age: 72
End: 2022-11-04

## 2022-11-04 ENCOUNTER — APPOINTMENT (OUTPATIENT)
Dept: OPHTHALMOLOGY | Facility: CLINIC | Age: 72
End: 2022-11-04

## 2022-11-04 PROCEDURE — 92012 INTRM OPH EXAM EST PATIENT: CPT

## 2022-11-18 ENCOUNTER — APPOINTMENT (OUTPATIENT)
Dept: FAMILY MEDICINE | Facility: CLINIC | Age: 72
End: 2022-11-18

## 2022-11-18 VITALS
SYSTOLIC BLOOD PRESSURE: 114 MMHG | HEIGHT: 69 IN | WEIGHT: 169 LBS | DIASTOLIC BLOOD PRESSURE: 75 MMHG | BODY MASS INDEX: 25.03 KG/M2 | TEMPERATURE: 97.3 F | OXYGEN SATURATION: 97 % | HEART RATE: 58 BPM

## 2022-11-18 DIAGNOSIS — M75.00 ADHESIVE CAPSULITIS OF UNSPECIFIED SHOULDER: ICD-10-CM

## 2022-11-18 PROCEDURE — G0439: CPT

## 2022-11-18 PROCEDURE — 90662 IIV NO PRSV INCREASED AG IM: CPT

## 2022-11-18 PROCEDURE — G0008: CPT

## 2022-11-18 NOTE — HEALTH RISK ASSESSMENT
[Never] : Never [Monthly or less (1 pt)] : Monthly or less (1 point) [1 or 2 (0 pts)] : 1 or 2 (0 points) [Never (0 pts)] : Never (0 points) [No] : In the past 12 months have you used drugs other than those required for medical reasons? No [No falls in past year] : Patient reported no falls in the past year [0] : 2) Feeling down, depressed, or hopeless: Not at all (0) [PHQ-2 Negative - No further assessment needed] : PHQ-2 Negative - No further assessment needed [No Retinopathy] : No retinopathy [Patient reported colonoscopy was normal] : Patient reported colonoscopy was normal [de-identified] : exercises regularly, lately unable due to wrist/forearm/shoulder pain. [de-identified] : watches [employer] [EyeExamDate] : 10/22 [ColonoscopyDate] : 03/22

## 2022-11-18 NOTE — HISTORY OF PRESENT ILLNESS
[de-identified] : 73 yo M with hx frozen shoulder, DM, HLD, HTN presents for annual. \par \par Pt reports getting relief after cortisone shot many years ago. Not doing PT but exercising on his own. Denies injury. Would like to see ortho again.\par \par

## 2022-11-21 LAB
25(OH)D3 SERPL-MCNC: 30 NG/ML
ALBUMIN SERPL ELPH-MCNC: 4.5 G/DL
ALP BLD-CCNC: 34 U/L
ALT SERPL-CCNC: 8 U/L
ANION GAP SERPL CALC-SCNC: 14 MMOL/L
AST SERPL-CCNC: 16 U/L
BASOPHILS # BLD AUTO: 0.06 K/UL
BASOPHILS NFR BLD AUTO: 0.8 %
BILIRUB SERPL-MCNC: 0.4 MG/DL
BUN SERPL-MCNC: 28 MG/DL
CALCIUM SERPL-MCNC: 9.7 MG/DL
CHLORIDE SERPL-SCNC: 105 MMOL/L
CHOLEST SERPL-MCNC: 160 MG/DL
CO2 SERPL-SCNC: 20 MMOL/L
CREAT SERPL-MCNC: 1.54 MG/DL
CREAT SPEC-SCNC: 66 MG/DL
EGFR: 47 ML/MIN/1.73M2
EOSINOPHIL # BLD AUTO: 0.23 K/UL
EOSINOPHIL NFR BLD AUTO: 3.2 %
ESTIMATED AVERAGE GLUCOSE: 163 MG/DL
GLUCOSE SERPL-MCNC: 217 MG/DL
HBA1C MFR BLD HPLC: 7.3 %
HCT VFR BLD CALC: 47.9 %
HDLC SERPL-MCNC: 31 MG/DL
HGB BLD-MCNC: 15 G/DL
IMM GRANULOCYTES NFR BLD AUTO: 0.3 %
LDLC SERPL CALC-MCNC: 89 MG/DL
LYMPHOCYTES # BLD AUTO: 2.83 K/UL
LYMPHOCYTES NFR BLD AUTO: 39.1 %
MAN DIFF?: NORMAL
MCHC RBC-ENTMCNC: 27.6 PG
MCHC RBC-ENTMCNC: 31.3 GM/DL
MCV RBC AUTO: 88.2 FL
MICROALBUMIN 24H UR DL<=1MG/L-MCNC: <1.2 MG/DL
MICROALBUMIN/CREAT 24H UR-RTO: NORMAL MG/G
MONOCYTES # BLD AUTO: 0.59 K/UL
MONOCYTES NFR BLD AUTO: 8.1 %
NEUTROPHILS # BLD AUTO: 3.51 K/UL
NEUTROPHILS NFR BLD AUTO: 48.5 %
NONHDLC SERPL-MCNC: 128 MG/DL
PLATELET # BLD AUTO: 232 K/UL
POTASSIUM SERPL-SCNC: 4.4 MMOL/L
PROT SERPL-MCNC: 7.1 G/DL
PSA SERPL-MCNC: 0.51 NG/ML
RBC # BLD: 5.43 M/UL
RBC # FLD: 14.6 %
SODIUM SERPL-SCNC: 139 MMOL/L
TRIGL SERPL-MCNC: 196 MG/DL
TSH SERPL-ACNC: 0.49 UIU/ML
VIT B12 SERPL-MCNC: 1108 PG/ML
WBC # FLD AUTO: 7.24 K/UL

## 2022-11-21 RX ORDER — METFORMIN HYDROCHLORIDE 1000 MG/1
1000 TABLET, COATED ORAL DAILY
Qty: 90 | Refills: 3 | Status: DISCONTINUED | COMMUNITY
End: 2022-11-21

## 2022-11-30 RX ORDER — DORZOLAMIDE HYDROCHLORIDE 20 MG/ML
2 SOLUTION OPHTHALMIC
Refills: 0 | Status: ACTIVE | COMMUNITY

## 2022-11-30 RX ORDER — LATANOPROST/PF 0.005 %
0.01 DROPS OPHTHALMIC (EYE)
Refills: 0 | Status: ACTIVE | COMMUNITY

## 2023-01-19 ENCOUNTER — APPOINTMENT (OUTPATIENT)
Dept: OPHTHALMOLOGY | Facility: CLINIC | Age: 73
End: 2023-01-19
Payer: MEDICARE

## 2023-01-19 ENCOUNTER — NON-APPOINTMENT (OUTPATIENT)
Age: 73
End: 2023-01-19

## 2023-01-19 PROCEDURE — 92012 INTRM OPH EXAM EST PATIENT: CPT

## 2023-02-06 ENCOUNTER — APPOINTMENT (OUTPATIENT)
Dept: CARDIOLOGY | Facility: CLINIC | Age: 73
End: 2023-02-06

## 2023-02-07 ENCOUNTER — APPOINTMENT (OUTPATIENT)
Dept: FAMILY MEDICINE | Facility: CLINIC | Age: 73
End: 2023-02-07
Payer: MEDICARE

## 2023-02-07 VITALS
SYSTOLIC BLOOD PRESSURE: 106 MMHG | DIASTOLIC BLOOD PRESSURE: 64 MMHG | HEIGHT: 69 IN | OXYGEN SATURATION: 99 % | HEART RATE: 63 BPM | WEIGHT: 178 LBS | BODY MASS INDEX: 26.36 KG/M2

## 2023-02-07 PROCEDURE — 99214 OFFICE O/P EST MOD 30 MIN: CPT

## 2023-02-07 RX ORDER — ALBUTEROL SULFATE 90 UG/1
108 (90 BASE) INHALANT RESPIRATORY (INHALATION)
Qty: 1 | Refills: 1 | Status: ACTIVE | COMMUNITY
Start: 2022-04-08 | End: 1900-01-01

## 2023-02-07 NOTE — HISTORY OF PRESENT ILLNESS
[FreeTextEntry1] : f/u DM, SANDYAP [de-identified] : 73 yo M with DM, glaucoma, CAD presents for f/u as well as CDPAP form completion. Pt is legally blind, needs a lot more help now. \par \par DM-- has been checking his sugars, on insulin, glipizide, jardiance. Denies hypoglycemia.\par \par BPs-- on losartan, metoprolol, furosemide, isosorbide-- denies dizziness.\par

## 2023-02-09 LAB
ALBUMIN SERPL ELPH-MCNC: 4.3 G/DL
ALP BLD-CCNC: 36 U/L
ALT SERPL-CCNC: 13 U/L
ANION GAP SERPL CALC-SCNC: 12 MMOL/L
AST SERPL-CCNC: 18 U/L
BILIRUB SERPL-MCNC: 0.3 MG/DL
BUN SERPL-MCNC: 25 MG/DL
CALCIUM SERPL-MCNC: 9.5 MG/DL
CHLORIDE SERPL-SCNC: 105 MMOL/L
CO2 SERPL-SCNC: 21 MMOL/L
CREAT SERPL-MCNC: 1.48 MG/DL
EGFR: 49 ML/MIN/1.73M2
ESTIMATED AVERAGE GLUCOSE: 169 MG/DL
GLUCOSE SERPL-MCNC: 270 MG/DL
HBA1C MFR BLD HPLC: 7.5 %
POTASSIUM SERPL-SCNC: 4.3 MMOL/L
PROT SERPL-MCNC: 6.8 G/DL
SODIUM SERPL-SCNC: 139 MMOL/L

## 2023-02-13 ENCOUNTER — NON-APPOINTMENT (OUTPATIENT)
Age: 73
End: 2023-02-13

## 2023-03-06 ENCOUNTER — NON-APPOINTMENT (OUTPATIENT)
Age: 73
End: 2023-03-06

## 2023-03-06 ENCOUNTER — APPOINTMENT (OUTPATIENT)
Dept: CARDIOLOGY | Facility: CLINIC | Age: 73
End: 2023-03-06
Payer: MEDICARE

## 2023-03-06 VITALS
HEART RATE: 56 BPM | DIASTOLIC BLOOD PRESSURE: 66 MMHG | SYSTOLIC BLOOD PRESSURE: 118 MMHG | OXYGEN SATURATION: 96 % | HEIGHT: 69 IN | WEIGHT: 179 LBS | BODY MASS INDEX: 26.51 KG/M2 | TEMPERATURE: 97.3 F

## 2023-03-06 PROCEDURE — 99214 OFFICE O/P EST MOD 30 MIN: CPT | Mod: 25

## 2023-03-06 PROCEDURE — 93000 ELECTROCARDIOGRAM COMPLETE: CPT

## 2023-03-06 NOTE — PHYSICAL EXAM
[Normal Conjunctiva] : normal conjunctiva [No Carotid Bruit] : no carotid bruit [Soft] : abdomen soft [Non Tender] : non-tender [Normal Bowel Sounds] : normal bowel sounds [No Edema] : no edema [No Cyanosis] : no cyanosis [Moves all extremities] : moves all extremities [No Focal Deficits] : no focal deficits [Normal Speech] : normal speech [Normal] : alert and oriented, normal memory [de-identified] : walks with cane

## 2023-03-06 NOTE — DISCUSSION/SUMMARY
[FreeTextEntry1] : In a summary Bryan Vidal is an elderly male with CAD s/p stents, stable. Continue Plavix. Hypertension, controlled. Continue Metoprolol, Losartan, Isosorbide and Lasix.  Hypercholesterolemia, continue Simvastatin  and low cholesterol diet. LDL goal lesS than 70 g/dL.Diabetes, on medications and low Carb diet.   Follow up in 6 months.

## 2023-03-06 NOTE — HISTORY OF PRESENT ILLNESS
[FreeTextEntry1] : Bryan Vidal is a 72 year old male with CAD s/p stents, hypertension, hypercholesterolemia and Diabetes comes for follow up visit. Denies any chest pain or palpitations. No  shortness of breath on exertion .  Complaint to medications and diet. Walks with cane.

## 2023-03-07 NOTE — ED ADULT TRIAGE NOTE - NS ED TRIAGE AVPU SCALE
Patient was called and reminded of appt with Dr. Janice Penn on 3/16/2023 . Pt is due for labs and instructed to have labs completed 1-2 days prior to appt and to call and schedule lab appt. Left message for pt with these instructions and call back number.  
Alert-The patient is alert, awake and responds to voice. The patient is oriented to time, place, and person. The triage nurse is able to obtain subjective information.

## 2023-03-21 ENCOUNTER — APPOINTMENT (OUTPATIENT)
Dept: GASTROENTEROLOGY | Facility: CLINIC | Age: 73
End: 2023-03-21

## 2023-03-21 ENCOUNTER — NON-APPOINTMENT (OUTPATIENT)
Age: 73
End: 2023-03-21

## 2023-03-21 ENCOUNTER — APPOINTMENT (OUTPATIENT)
Dept: OPHTHALMOLOGY | Facility: CLINIC | Age: 73
End: 2023-03-21
Payer: MEDICARE

## 2023-03-21 PROCEDURE — 92133 CPTRZD OPH DX IMG PST SGM ON: CPT

## 2023-03-21 PROCEDURE — 92012 INTRM OPH EXAM EST PATIENT: CPT

## 2023-03-26 ENCOUNTER — NON-APPOINTMENT (OUTPATIENT)
Age: 73
End: 2023-03-26

## 2023-05-18 ENCOUNTER — NON-APPOINTMENT (OUTPATIENT)
Age: 73
End: 2023-05-18

## 2023-05-18 ENCOUNTER — APPOINTMENT (OUTPATIENT)
Dept: OPHTHALMOLOGY | Facility: CLINIC | Age: 73
End: 2023-05-18
Payer: MEDICARE

## 2023-05-18 PROCEDURE — 92083 EXTENDED VISUAL FIELD XM: CPT

## 2023-05-18 PROCEDURE — 92015 DETERMINE REFRACTIVE STATE: CPT

## 2023-05-18 PROCEDURE — 92012 INTRM OPH EXAM EST PATIENT: CPT

## 2023-06-09 ENCOUNTER — APPOINTMENT (OUTPATIENT)
Dept: INTERNAL MEDICINE | Facility: CLINIC | Age: 73
End: 2023-06-09
Payer: MEDICARE

## 2023-06-09 ENCOUNTER — LABORATORY RESULT (OUTPATIENT)
Age: 73
End: 2023-06-09

## 2023-06-09 VITALS
DIASTOLIC BLOOD PRESSURE: 58 MMHG | WEIGHT: 182 LBS | HEART RATE: 64 BPM | BODY MASS INDEX: 26.96 KG/M2 | SYSTOLIC BLOOD PRESSURE: 102 MMHG | OXYGEN SATURATION: 97 % | HEIGHT: 69 IN

## 2023-06-09 PROCEDURE — 99214 OFFICE O/P EST MOD 30 MIN: CPT

## 2023-06-09 RX ORDER — GABAPENTIN 600 MG/1
600 TABLET, COATED ORAL
Qty: 180 | Refills: 3 | Status: ACTIVE | COMMUNITY
Start: 1900-01-01 | End: 1900-01-01

## 2023-06-09 RX ORDER — ISOSORBIDE MONONITRATE 30 MG/1
30 TABLET, EXTENDED RELEASE ORAL
Qty: 90 | Refills: 3 | Status: ACTIVE | COMMUNITY
Start: 1900-01-01 | End: 1900-01-01

## 2023-06-09 RX ORDER — FENOFIBRATE 145 MG/1
145 TABLET, COATED ORAL DAILY
Qty: 90 | Refills: 3 | Status: ACTIVE | COMMUNITY
Start: 1900-01-01 | End: 1900-01-01

## 2023-06-09 RX ORDER — CLOPIDOGREL BISULFATE 75 MG/1
75 TABLET, FILM COATED ORAL
Qty: 90 | Refills: 3 | Status: ACTIVE | COMMUNITY
Start: 1900-01-01 | End: 1900-01-01

## 2023-06-09 NOTE — HISTORY OF PRESENT ILLNESS
[FreeTextEntry1] : f/u DM, HLD, HTN [de-identified] : 73 yo M with DM, HLD, HTN, legally blind presents for f/u.\par \par Doing well. Continues to walk. Does report occasional dizziness upon standing. BPs have been low. Sometimes vision gets worse after walking. Follows with ophthalmology.\par \par

## 2023-06-13 LAB
ALBUMIN SERPL ELPH-MCNC: 4.5 G/DL
ALP BLD-CCNC: 38 U/L
ALT SERPL-CCNC: 7 U/L
ANION GAP SERPL CALC-SCNC: 13 MMOL/L
AST SERPL-CCNC: 20 U/L
BILIRUB SERPL-MCNC: 0.3 MG/DL
BUN SERPL-MCNC: 32 MG/DL
CALCIUM SERPL-MCNC: 9.4 MG/DL
CHLORIDE SERPL-SCNC: 104 MMOL/L
CHOLEST SERPL-MCNC: 190 MG/DL
CO2 SERPL-SCNC: 23 MMOL/L
CREAT SERPL-MCNC: 1.79 MG/DL
EGFR: 39 ML/MIN/1.73M2
ESTIMATED AVERAGE GLUCOSE: 183 MG/DL
GLUCOSE SERPL-MCNC: 366 MG/DL
HBA1C MFR BLD HPLC: 8 %
HDLC SERPL-MCNC: 28 MG/DL
LDLC SERPL CALC-MCNC: NORMAL MG/DL
NONHDLC SERPL-MCNC: 162 MG/DL
POTASSIUM SERPL-SCNC: 5 MMOL/L
PROT SERPL-MCNC: 7.1 G/DL
SODIUM SERPL-SCNC: 140 MMOL/L
TRIGL SERPL-MCNC: 494 MG/DL

## 2023-06-13 RX ORDER — SIMVASTATIN 20 MG/1
20 TABLET, FILM COATED ORAL DAILY
Qty: 90 | Refills: 3 | Status: COMPLETED | COMMUNITY
End: 2023-06-13

## 2023-07-05 NOTE — PROGRESS NOTE ADULT - ATTENDING COMMENTS
Dr. Loya (Attending Physician)  N - sedated and paralyzed  P - covid ards intubated 1/22, P:F 130s, driving pressure 18, will prone for low P:F today  C - CABG ho cad on home asa/plavix  GI - TFs to trickle while proned    - -500 mL, ckd good UO  H - lovenox 40 mg once daily, aspirin and plavix home meds  ID - mssa, H. influenza on unasyn on abx since 1/25,   E - dexamethasone 6 mg daily will swtich to IV, downtrending crp, NPH 40 units q6h, will consult Dr. Loya (Attending Physician)  N - sedated and paralyzed  P - covid ards intubated 1/22, P:F 130s, driving pressure 18, will prone for low P:F today  C - CABG ho cad on home asa/plavix  GI - TFs to trickle while proned    - -500 mL, ckd good UO  H - lovenox 40 mg once daily, aspirin and plavix home meds  ID - mssa, H. influenza on unasyn on abx since 1/25,   E - dexamethasone 6 mg daily will swtich to IV, downtrending crp, NPH 40 units q6h, will consult endo Gallbladder

## 2023-07-20 ENCOUNTER — APPOINTMENT (OUTPATIENT)
Dept: ENDOCRINOLOGY | Facility: CLINIC | Age: 73
End: 2023-07-20

## 2023-07-25 ENCOUNTER — APPOINTMENT (OUTPATIENT)
Dept: INTERNAL MEDICINE | Facility: CLINIC | Age: 73
End: 2023-07-25
Payer: MEDICARE

## 2023-07-25 VITALS
BODY MASS INDEX: 26.36 KG/M2 | SYSTOLIC BLOOD PRESSURE: 117 MMHG | WEIGHT: 178 LBS | HEART RATE: 60 BPM | HEIGHT: 69 IN | OXYGEN SATURATION: 96 % | DIASTOLIC BLOOD PRESSURE: 68 MMHG

## 2023-07-25 DIAGNOSIS — Z02.89 ENCOUNTER FOR OTHER ADMINISTRATIVE EXAMINATIONS: ICD-10-CM

## 2023-07-25 PROCEDURE — 99212 OFFICE O/P EST SF 10 MIN: CPT

## 2023-07-25 RX ORDER — POLYETHYLENE GLYCOL 3350, SODIUM CHLORIDE, SODIUM BICARBONATE AND POTASSIUM CHLORIDE WITH LEMON FLAVOR 420; 11.2; 5.72; 1.48 G/4L; G/4L; G/4L; G/4L
420 POWDER, FOR SOLUTION ORAL
Qty: 1 | Refills: 0 | Status: COMPLETED | COMMUNITY
Start: 2022-01-11 | End: 2023-07-25

## 2023-07-25 RX ORDER — SENNOSIDES 8.6 MG/1
8.6 CAPSULE, GELATIN COATED ORAL
Qty: 30 | Refills: 0 | Status: COMPLETED | COMMUNITY
End: 2023-07-25

## 2023-07-25 NOTE — HISTORY OF PRESENT ILLNESS
[Family Member] : family member [FreeTextEntry1] : [place] form completion [de-identified] : 73 yo M with glaucoma, DM presents for [place] form completion. \par \par No interval changes. \par \par Working on diet-- not able to see nutritionist. A1c usually good but was a slight elevation on last labs. a1c 8.0. Goal 7.5. \par

## 2023-08-24 ENCOUNTER — NON-APPOINTMENT (OUTPATIENT)
Age: 73
End: 2023-08-24

## 2023-08-24 ENCOUNTER — APPOINTMENT (OUTPATIENT)
Dept: OPHTHALMOLOGY | Facility: CLINIC | Age: 73
End: 2023-08-24
Payer: MEDICARE

## 2023-08-24 PROCEDURE — 92014 COMPRE OPH EXAM EST PT 1/>: CPT

## 2023-09-08 ENCOUNTER — APPOINTMENT (OUTPATIENT)
Dept: INTERNAL MEDICINE | Facility: CLINIC | Age: 73
End: 2023-09-08
Payer: MEDICARE

## 2023-09-08 VITALS — DIASTOLIC BLOOD PRESSURE: 60 MMHG | SYSTOLIC BLOOD PRESSURE: 100 MMHG

## 2023-09-08 VITALS
SYSTOLIC BLOOD PRESSURE: 103 MMHG | BODY MASS INDEX: 26.36 KG/M2 | HEIGHT: 69 IN | OXYGEN SATURATION: 100 % | DIASTOLIC BLOOD PRESSURE: 64 MMHG | HEART RATE: 60 BPM | WEIGHT: 178 LBS

## 2023-09-08 PROCEDURE — 99214 OFFICE O/P EST MOD 30 MIN: CPT

## 2023-09-08 NOTE — HISTORY OF PRESENT ILLNESS
[Family Member] : family member [FreeTextEntry1] : f/u [de-identified] : 71 yo M with HTN, DM presents for f/u.  Pt has been feeling dizzy upon standing. Also, when BP low, pt's vision decreases. BP today 100/60. Pt reports decreased vision in office. Pt taking lasix daily still. Does not report swelling in legs. He does check his sugars when he feels dizzy, usually good.   Has been working on low carbs. On insulin.

## 2023-09-12 ENCOUNTER — NON-APPOINTMENT (OUTPATIENT)
Age: 73
End: 2023-09-12

## 2023-09-12 ENCOUNTER — APPOINTMENT (OUTPATIENT)
Dept: CARDIOLOGY | Facility: CLINIC | Age: 73
End: 2023-09-12
Payer: MEDICARE

## 2023-09-12 VITALS — SYSTOLIC BLOOD PRESSURE: 124 MMHG | DIASTOLIC BLOOD PRESSURE: 72 MMHG

## 2023-09-12 VITALS
TEMPERATURE: 97.3 F | BODY MASS INDEX: 26.22 KG/M2 | SYSTOLIC BLOOD PRESSURE: 164 MMHG | OXYGEN SATURATION: 99 % | HEIGHT: 69 IN | WEIGHT: 177 LBS | RESPIRATION RATE: 12 BRPM | DIASTOLIC BLOOD PRESSURE: 80 MMHG | HEART RATE: 57 BPM

## 2023-09-12 DIAGNOSIS — R06.02 SHORTNESS OF BREATH: ICD-10-CM

## 2023-09-12 LAB
ALBUMIN SERPL ELPH-MCNC: 4.4 G/DL
ALP BLD-CCNC: 36 U/L
ALT SERPL-CCNC: 10 U/L
ANION GAP SERPL CALC-SCNC: 15 MMOL/L
AST SERPL-CCNC: 17 U/L
BILIRUB SERPL-MCNC: 0.4 MG/DL
BUN SERPL-MCNC: 23 MG/DL
CALCIUM SERPL-MCNC: 9.5 MG/DL
CHLORIDE SERPL-SCNC: 106 MMOL/L
CHOLEST SERPL-MCNC: 132 MG/DL
CO2 SERPL-SCNC: 20 MMOL/L
CREAT SERPL-MCNC: 1.53 MG/DL
EGFR: 47 ML/MIN/1.73M2
ESTIMATED AVERAGE GLUCOSE: 189 MG/DL
GLUCOSE SERPL-MCNC: 256 MG/DL
HBA1C MFR BLD HPLC: 8.2 %
HDLC SERPL-MCNC: 31 MG/DL
LDLC SERPL CALC-MCNC: 72 MG/DL
NONHDLC SERPL-MCNC: 101 MG/DL
POTASSIUM SERPL-SCNC: 4.6 MMOL/L
PROT SERPL-MCNC: 7.3 G/DL
SODIUM SERPL-SCNC: 141 MMOL/L
TRIGL SERPL-MCNC: 171 MG/DL

## 2023-09-12 PROCEDURE — 99214 OFFICE O/P EST MOD 30 MIN: CPT | Mod: 25

## 2023-09-12 PROCEDURE — 93306 TTE W/DOPPLER COMPLETE: CPT

## 2023-09-12 PROCEDURE — 93000 ELECTROCARDIOGRAM COMPLETE: CPT

## 2023-09-15 ENCOUNTER — NON-APPOINTMENT (OUTPATIENT)
Age: 73
End: 2023-09-15

## 2023-09-15 ENCOUNTER — APPOINTMENT (OUTPATIENT)
Dept: OPHTHALMOLOGY | Facility: CLINIC | Age: 73
End: 2023-09-15
Payer: MEDICARE

## 2023-09-15 PROCEDURE — 92133 CPTRZD OPH DX IMG PST SGM ON: CPT

## 2023-09-15 PROCEDURE — 92012 INTRM OPH EXAM EST PATIENT: CPT | Mod: 25

## 2023-10-13 ENCOUNTER — APPOINTMENT (OUTPATIENT)
Dept: ENDOCRINOLOGY | Facility: CLINIC | Age: 73
End: 2023-10-13

## 2023-12-05 ENCOUNTER — APPOINTMENT (OUTPATIENT)
Dept: INTERNAL MEDICINE | Facility: CLINIC | Age: 73
End: 2023-12-05
Payer: MEDICARE

## 2023-12-05 VITALS
SYSTOLIC BLOOD PRESSURE: 116 MMHG | WEIGHT: 179 LBS | DIASTOLIC BLOOD PRESSURE: 62 MMHG | HEART RATE: 66 BPM | BODY MASS INDEX: 26.51 KG/M2 | OXYGEN SATURATION: 98 % | HEIGHT: 69 IN

## 2023-12-05 DIAGNOSIS — R79.89 OTHER SPECIFIED ABNORMAL FINDINGS OF BLOOD CHEMISTRY: ICD-10-CM

## 2023-12-05 DIAGNOSIS — Z00.00 ENCOUNTER FOR GENERAL ADULT MEDICAL EXAMINATION W/OUT ABNORMAL FINDINGS: ICD-10-CM

## 2023-12-05 PROCEDURE — G0439: CPT

## 2023-12-09 LAB
ALBUMIN SERPL ELPH-MCNC: 4.4 G/DL
ALP BLD-CCNC: 37 U/L
ALT SERPL-CCNC: 6 U/L
ANION GAP SERPL CALC-SCNC: 11 MMOL/L
AST SERPL-CCNC: 18 U/L
BILIRUB SERPL-MCNC: 0.3 MG/DL
BUN SERPL-MCNC: 25 MG/DL
CALCIUM SERPL-MCNC: 9.6 MG/DL
CHLORIDE SERPL-SCNC: 107 MMOL/L
CO2 SERPL-SCNC: 22 MMOL/L
CREAT SERPL-MCNC: 1.44 MG/DL
EGFR: 51 ML/MIN/1.73M2
ESTIMATED AVERAGE GLUCOSE: 177 MG/DL
GLUCOSE SERPL-MCNC: 324 MG/DL
HBA1C MFR BLD HPLC: 7.8 %
HCT VFR BLD CALC: 47.1 %
HGB BLD-MCNC: 14.6 G/DL
MCHC RBC-ENTMCNC: 27.2 PG
MCHC RBC-ENTMCNC: 31 GM/DL
MCV RBC AUTO: 87.7 FL
PLATELET # BLD AUTO: 206 K/UL
POTASSIUM SERPL-SCNC: 4.5 MMOL/L
PROT SERPL-MCNC: 7.6 G/DL
PSA SERPL-MCNC: 0.47 NG/ML
RBC # BLD: 5.37 M/UL
RBC # FLD: 14.8 %
SODIUM SERPL-SCNC: 139 MMOL/L
TSH SERPL-ACNC: 0.32 UIU/ML
WBC # FLD AUTO: 7.03 K/UL

## 2023-12-15 ENCOUNTER — APPOINTMENT (OUTPATIENT)
Dept: OPHTHALMOLOGY | Facility: CLINIC | Age: 73
End: 2023-12-15
Payer: MEDICARE

## 2023-12-15 ENCOUNTER — NON-APPOINTMENT (OUTPATIENT)
Age: 73
End: 2023-12-15

## 2023-12-15 PROCEDURE — 92012 INTRM OPH EXAM EST PATIENT: CPT

## 2023-12-28 ENCOUNTER — APPOINTMENT (OUTPATIENT)
Dept: OPHTHALMOLOGY | Facility: CLINIC | Age: 73
End: 2023-12-28
Payer: MEDICARE

## 2023-12-28 ENCOUNTER — NON-APPOINTMENT (OUTPATIENT)
Age: 73
End: 2023-12-28

## 2023-12-28 DIAGNOSIS — H54.8 LEGAL BLINDNESS, AS DEFINED IN USA: ICD-10-CM

## 2023-12-28 PROCEDURE — 92012 INTRM OPH EXAM EST PATIENT: CPT

## 2024-01-19 ENCOUNTER — APPOINTMENT (OUTPATIENT)
Dept: ENDOCRINOLOGY | Facility: CLINIC | Age: 74
End: 2024-01-19
Payer: MEDICARE

## 2024-01-19 VITALS
SYSTOLIC BLOOD PRESSURE: 144 MMHG | WEIGHT: 177 LBS | DIASTOLIC BLOOD PRESSURE: 90 MMHG | HEIGHT: 69 IN | HEART RATE: 60 BPM | BODY MASS INDEX: 26.22 KG/M2 | OXYGEN SATURATION: 98 %

## 2024-01-19 PROCEDURE — 95249 CONT GLUC MNTR PT PROV EQP: CPT

## 2024-01-19 PROCEDURE — 99205 OFFICE O/P NEW HI 60 MIN: CPT | Mod: 25

## 2024-01-22 NOTE — ASSESSMENT
[Diabetes Foot Care] : diabetes foot care [Long Term Vascular Complications] : long term vascular complications of diabetes [Carbohydrate Consistent Diet] : carbohydrate consistent diet [Importance of Diet and Exercise] : importance of diet and exercise to improve glycemic control, achieve weight loss and improve cardiovascular health [Exercise/Effect on Glucose] : exercise/effect on glucose [Hypoglycemia Management] : hypoglycemia management [Self Monitoring of Blood Glucose] : self monitoring of blood glucose [Retinopathy Screening] : Patient was referred to ophthalmology for retinopathy screening [FreeTextEntry1] : 72 yo M with PMH of HTN, HLD, CAD s/p 4 stents, glaucoma, CKD, here to establish care for Type 2 Diabetes.  Type 2 Diabetes: - A1c 7.8% (12/5/2023) <-- 8.2% (9/8/2023) <-- 8.0% (6/2023).  - Did not bring BG log/glucometer today, per patient recall fasting and prandial BG mostly at goal. - Continue Jardiance 25 mg daily. - Continue Basaglar 30 units qhs. - Continue Novolog 10 units TIDAC. - Stop glipizide 5 mg qam due to risk for hypoglycemia. - Start Trulicity 0.75 mg weekly for further glycemic control, weight loss benefit (BMI 26.14), cardiovascular and renal benefit. R+B of GLP-1 discussed with the patient. Denies personal or family history of thyroid cancer or pancreatitis. - Discussed hypoglycemia management. - Discussed diet modification, carb consistent diet, and exercise.  - Continue SMBG 2-3x daily (keep glucose log/bring meter to all visits and send readings to us). Call if persistent highs or lows.  Will try to order CGM given patient is on MDI.  S[employer] teach and education provided today, assisted with Sierra 3 flora set up >10 minutes. - F/u with ophthalmology and podiatry annually. UTD with ophtho.  He is legally blind.  HTN: - Goal BP <130/80, /90 today. - On furosemide 20 mg qod, isosorbide mononitrate ER 30 mg qd, losartan 25 mg qd, metoprolol ER 25 mg qd.  Continue management as per cardiologist or PCP. - eGFR 51 (12/5/2023). UACR negative (11/8/2022). Check UACR next visit.  HLD: - Goal LDL <70. LDL 72, HDL 31,  (9/8/2023). - On atorvastatin 20 mg qd, fenofibrate 145 mg qd.   F/u in 3 months with Dr. Aceves.  Justina Sánchez NP (Brenda)

## 2024-01-22 NOTE — PHYSICAL EXAM
[Alert] : alert [No Acute Distress] : no acute distress [Normal Sclera/Conjunctiva] : normal sclera/conjunctiva [No Proptosis] : no proptosis [Thyroid Not Enlarged] : the thyroid was not enlarged [No Respiratory Distress] : no respiratory distress [No Accessory Muscle Use] : no accessory muscle use [Clear to Auscultation] : lungs were clear to auscultation bilaterally [Normal S1, S2] : normal S1 and S2 [Normal Rate] : heart rate was normal [Regular Rhythm] : with a regular rhythm [No Edema] : no peripheral edema [Normal Bowel Sounds] : normal bowel sounds [Not Tender] : non-tender [Soft] : abdomen soft [No Spinal Tenderness] : no spinal tenderness [Normal Gait] : normal gait [No Rash] : no rash [Right foot was examined, including] : right foot ~C was examined, including visual inspection with sensory and pulse exams [Left foot was examined, including] : left foot ~C was examined, including visual inspection with sensory and pulse exams [Normal] : normal [2+] : 2+ in the dorsalis pedis [Cranial Nerves Intact] : cranial nerves 2-12 were intact [Oriented x3] : oriented to person, place, and time [Normal Affect] : the affect was normal [Normal Insight/Judgement] : insight and judgment were intact [Normal Mood] : the mood was normal [Diminished Throughout Both Feet] : normal tactile sensation with monofilament testing throughout both feet [de-identified] : MFT done today 1/19/2024

## 2024-01-22 NOTE — HISTORY OF PRESENT ILLNESS
[FreeTextEntry1] : 74 yo M with PMH of HTN, HLD, CAD s/p 4 stents, glaucoma, CKD, here to establish care for Type 2 Diabetes. Referred by PCP Dr. Jackson.  Accompanied by daughter today.  Diagnosis: DM about 28 years ago Previous Endo: none, managed by PCP A1c: 7.8% (2023) <-- 8.2% (2023) <-- 8.0% (2023). Pertinent labs: TSH 0.32 (2023). Vitamin B12 1108 (2022).  DM med adherence: good  Current DM Medications: -Jardiance 25 mg daily (started mid-, tolerating well) -Basaglar 30 units qhs (has been on insulin for many years) -Novolog 10 units TIDAC (before each meal, waits 5-10 minutes before start of meal) -glipizide 5 mg qam (started mid-, tolerating well)  Past DM medications:  -metformin 500 mg BID (tolerated well, stopped mid  due to lowering kidney function, switched to Jardiance after) -Janumet (tolerated well, stopped and switched to Jardiance)  Complications: +CAD s/p 4 stents (). Denies CVA. Denies h/o DKA or other hospitalizations for DM. Eyes: Last ophthalmology visit 2023. Denies h/o of diabetic retinopathy. +Legally blind, R eye only has 25% vision, L eye has 0% vision, attributed to glaucoma. Feet: Has not seen podiatry before. Self-checks feet. Denies h/o neuropathy. Denies h/o foot ulcers or sores. Nephrology: +CKD. eGFR 51 (2023). UACR negative (2022). Denies h/o UTIs or genital mycotic infections. Denies polyuria or polydipsia. Sometimes gets thirsty when BP or BG is low.  Weight Change: stable at 177 lbs (wants to lose about 15 lbs)  SMBG (Contour Next): checks 2-4x/day Patient recall as per below:  fasting- 104, 96, 120, 130, 81 (usually between ) pre-lunch- 120s-130s 30 mins post-dinner- 100-150 Hypoglycemia: Denies hypoglycemia. Reports 80 has been lowest recently, in the past has felt shaky and sweaty before.  Current Diet: 3 meals a day, good appetite breakfast- oatmeal, 2 slices ww toast, eggs sometimes with toast (not regularly), banana sometimes, coffee or tea no sugar with a little 2% milk    lunch- depends, 1 fistful of rice or ww roti or pasta, chicken/fish, veggies dinner- similar to lunch (lighter than lunch), salad, grilled chicken/fish, steamed veggies snacks- on occasion, not always, sugar free cookies, usually sugar free snacks,  drinks- mostly water, no soda or juice  Exercise: walking 90 minutes daily when weather is good, sometimes stationary bike   HLD: On atorvastatin 20 mg qd, fenofibrate 145 mg qd. LDL 72, HDL 31,  (2023). HTN: On furosemide 20 mg qod, isosorbide mononitrate ER 30 mg qd, losartan 25 mg qd, metoprolol ER 25 mg qd.   PMH: T2DM, HTN, HLD, CAD s/p 4 stents, glaucoma Surgical Hx: none Social Hx: never a smoker, social alcohol use  FHx: daughter (DM), son (DM), dad (CVA), mom ( from MVA), denies personal or FHx of pancreatitis or thyroid cancer Current Meds: DM meds as above, gabapentin 600 mg BID, atorvastatin 20 mg qd, fenofibrate 145 mg qd, clopidogrel 75 mg qd, furosemide 20 mg qod, isosorbide mononitrate ER 30 mg qd, losartan 25 mg qd, metoprolol ER 25 mg qd, tamsulosin 0.4 mg qd, dorzolamide ophthalmic solution, latanoprost ophthalmic solution Supplements: MVI, fish oil, vitamin C, vitamin D 1000 IU qd, cod liver oil Occupation: retired

## 2024-01-24 RX ORDER — BLOOD-GLUCOSE SENSOR
EACH MISCELLANEOUS
Qty: 6 | Refills: 3 | Status: ACTIVE | COMMUNITY
Start: 2024-01-24 | End: 1900-01-01

## 2024-03-01 ENCOUNTER — NON-APPOINTMENT (OUTPATIENT)
Age: 74
End: 2024-03-01

## 2024-03-01 ENCOUNTER — APPOINTMENT (OUTPATIENT)
Dept: OPHTHALMOLOGY | Facility: CLINIC | Age: 74
End: 2024-03-01
Payer: MEDICARE

## 2024-03-01 PROCEDURE — 92012 INTRM OPH EXAM EST PATIENT: CPT

## 2024-03-08 ENCOUNTER — APPOINTMENT (OUTPATIENT)
Dept: INTERNAL MEDICINE | Facility: CLINIC | Age: 74
End: 2024-03-08
Payer: MEDICARE

## 2024-03-08 VITALS
OXYGEN SATURATION: 96 % | SYSTOLIC BLOOD PRESSURE: 100 MMHG | HEART RATE: 68 BPM | DIASTOLIC BLOOD PRESSURE: 65 MMHG | BODY MASS INDEX: 26.66 KG/M2 | HEIGHT: 69 IN | WEIGHT: 180 LBS

## 2024-03-08 VITALS — SYSTOLIC BLOOD PRESSURE: 96 MMHG | DIASTOLIC BLOOD PRESSURE: 60 MMHG

## 2024-03-08 PROCEDURE — 99214 OFFICE O/P EST MOD 30 MIN: CPT

## 2024-03-08 PROCEDURE — G2211 COMPLEX E/M VISIT ADD ON: CPT

## 2024-03-08 RX ORDER — GLIPIZIDE 5 MG/1
5 TABLET ORAL DAILY
Qty: 90 | Refills: 3 | Status: COMPLETED | COMMUNITY
Start: 2022-11-21 | End: 2024-03-08

## 2024-03-08 NOTE — HISTORY OF PRESENT ILLNESS
[FreeTextEntry1] : f/u [de-identified] : 74 yo M with CAD, BPH, HLD, HTN, DM, glaucoma presents for f/u.  BP-- has been low daily. Feeling dizzy. Has been having salt water daily. Lasix was discontinued at last visit but pt keeps taking it every other day. Took lasix this morning. Also just had some salt water as he was feeling dizzy. Also vision decreased due to poor vision already. Has appt with cardiology next week.  DM-- saw esperanza, stopped glipizide, now on trulicity. Does not eat much carbs for dinner, has a bit of rice for lunch. Not been walking due to weather. Has new continuous glucometer but dislikes it as it beeps frequently. He will discuss with esperanza next month for his f/u.

## 2024-03-12 ENCOUNTER — NON-APPOINTMENT (OUTPATIENT)
Age: 74
End: 2024-03-12

## 2024-03-12 ENCOUNTER — APPOINTMENT (OUTPATIENT)
Dept: CARDIOLOGY | Facility: CLINIC | Age: 74
End: 2024-03-12
Payer: MEDICARE

## 2024-03-12 VITALS
DIASTOLIC BLOOD PRESSURE: 56 MMHG | HEART RATE: 69 BPM | TEMPERATURE: 98.2 F | SYSTOLIC BLOOD PRESSURE: 108 MMHG | RESPIRATION RATE: 12 BRPM | WEIGHT: 180 LBS | HEIGHT: 69 IN | OXYGEN SATURATION: 98 % | BODY MASS INDEX: 26.66 KG/M2

## 2024-03-12 DIAGNOSIS — I25.10 ATHEROSCLEROTIC HEART DISEASE OF NATIVE CORONARY ARTERY W/OUT ANGINA PECTORIS: ICD-10-CM

## 2024-03-12 PROCEDURE — 93000 ELECTROCARDIOGRAM COMPLETE: CPT

## 2024-03-12 PROCEDURE — 99214 OFFICE O/P EST MOD 30 MIN: CPT | Mod: 25

## 2024-03-12 NOTE — HISTORY OF PRESENT ILLNESS
[FreeTextEntry1] : Bryan Vidal is a 73-year-old male with CAD s/p stents, hypertension, hypercholesterolemia and Diabetes comes for follow up visit. Denies any chest pain or palpitations. No shortness of breath on exertion.   Complaint to medications and diet.

## 2024-03-12 NOTE — DISCUSSION/SUMMARY
[FreeTextEntry1] : In a summary Bryan Vidal is an elderly male with CAD s/p stents, stable. Continue Plavix. Hypertension, controlled. Continue Metoprolol and Isosorbide.  Hypercholesterolemia, continue Atorvastatin and low cholesterol diet. LDL goal less than 70 g/dL. Diabetes, on medications and low Carb diet. Continue healthy lifestyle. Follow up in 6 months.

## 2024-03-12 NOTE — PHYSICAL EXAM
[Normal Conjunctiva] : normal conjunctiva [No Carotid Bruit] : no carotid bruit [Soft] : abdomen soft [Non Tender] : non-tender [Normal Bowel Sounds] : normal bowel sounds [No Edema] : no edema [No Cyanosis] : no cyanosis [Moves all extremities] : moves all extremities [No Focal Deficits] : no focal deficits [Normal Speech] : normal speech [Normal] : alert and oriented, normal memory [de-identified] : walks with cane

## 2024-03-12 NOTE — REVIEW OF SYSTEMS
[Joint Pain] : joint pain [Negative] : Respiratory [Fever] : no fever [Chills] : no chills [Headache] : no headache [Blurry Vision] : no blurred vision [Feeling Fatigued] : not feeling fatigued [Earache] : no earache [Dyspnea on exertion] : not dyspnea during exertion [Chest Discomfort] : no chest discomfort [Lower Ext Edema] : no extremity edema [Palpitations] : no palpitations [Orthopnea] : no orthopnea [PND] : no PND [Abdominal Pain] : no abdominal pain [Heartburn] : no heartburn [Nausea] : no nausea [Vomiting] : no vomiting [Rash] : no rash [Urinary Frequency] : no change in urinary frequency [Itching] : no itching [Dizziness] : no dizziness [Tremor] : no tremor was seen [Numbness (Hypoesthesia)] : no numbness [Tingling (Paresthesia)] : no tingling [Confusion] : no confusion was observed [Under Stress] : not under stress [Easy Bleeding] : no tendency for easy bleeding [Easy Bruising] : no tendency for easy bruising

## 2024-03-18 ENCOUNTER — RX RENEWAL (OUTPATIENT)
Age: 74
End: 2024-03-18

## 2024-04-24 ENCOUNTER — APPOINTMENT (OUTPATIENT)
Dept: ENDOCRINOLOGY | Facility: CLINIC | Age: 74
End: 2024-04-24
Payer: MEDICARE

## 2024-04-24 VITALS
SYSTOLIC BLOOD PRESSURE: 120 MMHG | HEART RATE: 65 BPM | BODY MASS INDEX: 25.92 KG/M2 | WEIGHT: 175 LBS | DIASTOLIC BLOOD PRESSURE: 74 MMHG | HEIGHT: 69 IN | OXYGEN SATURATION: 99 %

## 2024-04-24 LAB
CREAT SPEC-SCNC: 79 MG/DL
HBA1C MFR BLD HPLC: 6.5
MICROALBUMIN 24H UR DL<=1MG/L-MCNC: <1.2 MG/DL
MICROALBUMIN/CREAT 24H UR-RTO: NORMAL MG/G

## 2024-04-24 PROCEDURE — 83036 HEMOGLOBIN GLYCOSYLATED A1C: CPT | Mod: QW

## 2024-04-24 PROCEDURE — G2211 COMPLEX E/M VISIT ADD ON: CPT | Mod: NC,1L

## 2024-04-24 PROCEDURE — 99214 OFFICE O/P EST MOD 30 MIN: CPT | Mod: 25

## 2024-04-24 RX ORDER — INSULIN ASPART 100 [IU]/ML
100 INJECTION, SOLUTION INTRAVENOUS; SUBCUTANEOUS
Qty: 3 | Refills: 3 | Status: ACTIVE | COMMUNITY
Start: 2022-01-11 | End: 1900-01-01

## 2024-04-24 RX ORDER — BLOOD SUGAR DIAGNOSTIC
STRIP MISCELLANEOUS
Qty: 1 | Refills: 10 | Status: ACTIVE | COMMUNITY
Start: 2021-05-05 | End: 1900-01-01

## 2024-04-24 RX ORDER — INSULIN GLARGINE 100 [IU]/ML
100 INJECTION, SOLUTION SUBCUTANEOUS
Qty: 1 | Refills: 3 | Status: ACTIVE | COMMUNITY
Start: 2022-04-05 | End: 1900-01-01

## 2024-04-24 RX ORDER — ELECTROLYTES/DEXTROSE
32G X 4 MM SOLUTION, ORAL ORAL
Qty: 1 | Refills: 0 | Status: ACTIVE | COMMUNITY
Start: 2022-05-10 | End: 1900-01-01

## 2024-04-24 RX ORDER — EMPAGLIFLOZIN 25 MG/1
25 TABLET, FILM COATED ORAL DAILY
Qty: 90 | Refills: 3 | Status: ACTIVE | COMMUNITY
Start: 1900-01-01 | End: 1900-01-01

## 2024-04-24 NOTE — ASSESSMENT
[Diabetes Foot Care] : diabetes foot care [Long Term Vascular Complications] : long term vascular complications of diabetes [Carbohydrate Consistent Diet] : carbohydrate consistent diet [Importance of Diet and Exercise] : importance of diet and exercise to improve glycemic control, achieve weight loss and improve cardiovascular health [Exercise/Effect on Glucose] : exercise/effect on glucose [Hypoglycemia Management] : hypoglycemia management [Self Monitoring of Blood Glucose] : self monitoring of blood glucose [Retinopathy Screening] : Patient was referred to ophthalmology for retinopathy screening [Weight Loss] : weight loss [Diabetic Medications] : Risks and benefits of diabetic medications were discussed [FreeTextEntry1] : 72 yo M with PMH of HTN, HLD, CAD s/p 4 stents, glaucoma, CKD, here to follow up for Type 2 Diabetes.  Type 2 Diabetes w/ improved control  - A1c in office today 6.5% (04/24/24)<<  A1c 7.8% (12/5/2023) <-- 8.2% (9/8/2023) <-- 8.0% (6/2023).  - Did not bring BG log/glucometer today, per patient recall having low sugars in the morning, premeal sugars later on in the day at goal.  - Patient has been eating smaller portions and watching his diet. Also increasing his activity.  PLAN:  - Continue Jardiance 25 mg daily. - Reduce to Basaglar 14 units qhs. - Reduce to Novolog 6 units TIDAC. - Increase to Trulicity 1.5 mg weekly for further glycemic control, weight loss benefit (BMI 26.14 >> 25.8), cardiovascular and renal benefit. R+B of GLP-1 discussed with the patient. Denies personal or family history of thyroid cancer or pancreatitis. - Discussed hypoglycemia management. - Discussed diet modification, carb consistent diet, and exercise.  - Continue SMBG 3-4x daily (keep glucose log/bring meter to all visits and send readings to us). Call if persistent highs or lows.   - Patient previously set up with [employer] CGM >> does not wish to continue with this, reports readings did not correlate with FS and he finds it annoying/alarming all the time.  - Patient asked to bring glucose logs to next visit. Patient/aid verbalized understanding. Given glucose logs with BG targets written out for patient.  - F/u with ophthalmology and podiatry annually. UTD with ophtho.  He is legally blind 2/2 Glaucoma per pt. No hx of DR.   HTN: - Goal BP <130/8 - On isosorbide mononitrate ER 30 mg qd, losartan 25 mg qd, metoprolol ER 25 mg qd.  Continue management as per cardiologist or PCP. Reports furosemide recently dc.  - eGFR 51 (12/5/2023). UACR negative (11/8/2022).  - Check UACR  HLD: - Goal LDL <70. LDL 72, HDL 31,  (9/8/2023). - On atorvastatin 20 mg qd, fenofibrate 145 mg qd.   RTC in 3 months

## 2024-04-24 NOTE — PHYSICAL EXAM
[Alert] : alert [No Acute Distress] : no acute distress [Normal Sclera/Conjunctiva] : normal sclera/conjunctiva [EOMI] : extra ocular movement intact [No Proptosis] : no proptosis [Normal Outer Ear/Nose] : the ears and nose were normal in appearance [Thyroid Not Enlarged] : the thyroid was not enlarged [No Respiratory Distress] : no respiratory distress [No Accessory Muscle Use] : no accessory muscle use [Normal Rate and Effort] : normal respiratory rate and effort [Normal Rate] : heart rate was normal [Regular Rhythm] : with a regular rhythm [No Edema] : no peripheral edema [Not Tender] : non-tender [Not Distended] : not distended [Soft] : abdomen soft [No Spinal Tenderness] : no spinal tenderness [No Stigmata of Cushings Syndrome] : no stigmata of Cushings Syndrome [Normal Strength/Tone] : muscle strength and tone were normal [No Rash] : no rash [Right foot was examined, including] : right foot ~C was examined, including visual inspection with sensory and pulse exams [Left foot was examined, including] : left foot ~C was examined, including visual inspection with sensory and pulse exams [Normal] : normal [2+] : 2+ in the dorsalis pedis [No Motor Deficits] : the motor exam was normal [No Tremors] : no tremors [Oriented x3] : oriented to person, place, and time [Normal Affect] : the affect was normal [Normal Insight/Judgement] : insight and judgment were intact [Normal Mood] : the mood was normal [No Neck Mass] : no neck mass was observed [Acanthosis Nigricans] : no acanthosis nigricans [Foot Ulcers] : no foot ulcers [Diminished Throughout Both Feet] : normal tactile sensation with monofilament testing throughout both feet [de-identified] : has cane with him today  [de-identified] : MFT done 1/19/2024

## 2024-06-13 ENCOUNTER — NON-APPOINTMENT (OUTPATIENT)
Age: 74
End: 2024-06-13

## 2024-06-13 ENCOUNTER — APPOINTMENT (OUTPATIENT)
Dept: OPHTHALMOLOGY | Facility: CLINIC | Age: 74
End: 2024-06-13
Payer: MEDICARE

## 2024-06-13 PROCEDURE — 92014 COMPRE OPH EXAM EST PT 1/>: CPT | Mod: 25

## 2024-06-13 PROCEDURE — 92250 FUNDUS PHOTOGRAPHY W/I&R: CPT

## 2024-06-18 ENCOUNTER — APPOINTMENT (OUTPATIENT)
Dept: INTERNAL MEDICINE | Facility: CLINIC | Age: 74
End: 2024-06-18
Payer: MEDICARE

## 2024-06-18 VITALS
SYSTOLIC BLOOD PRESSURE: 118 MMHG | TEMPERATURE: 98 F | BODY MASS INDEX: 25.92 KG/M2 | HEIGHT: 69 IN | OXYGEN SATURATION: 99 % | DIASTOLIC BLOOD PRESSURE: 67 MMHG | HEART RATE: 68 BPM | WEIGHT: 175 LBS

## 2024-06-18 DIAGNOSIS — E11.9 TYPE 2 DIABETES MELLITUS W/OUT COMPLICATIONS: ICD-10-CM

## 2024-06-18 DIAGNOSIS — E78.00 PURE HYPERCHOLESTEROLEMIA, UNSPECIFIED: ICD-10-CM

## 2024-06-18 DIAGNOSIS — I10 ESSENTIAL (PRIMARY) HYPERTENSION: ICD-10-CM

## 2024-06-18 DIAGNOSIS — Z12.5 ENCOUNTER FOR SCREENING FOR MALIGNANT NEOPLASM OF PROSTATE: ICD-10-CM

## 2024-06-18 PROCEDURE — 99215 OFFICE O/P EST HI 40 MIN: CPT | Mod: 25

## 2024-06-18 RX ORDER — LOSARTAN POTASSIUM 25 MG/1
25 TABLET, FILM COATED ORAL DAILY
Qty: 90 | Refills: 3 | Status: ACTIVE | COMMUNITY
Start: 2021-03-10 | End: 1900-01-01

## 2024-06-18 RX ORDER — MELATONIN 3 MG
3 CAPSULE ORAL
Refills: 0 | Status: DISCONTINUED | COMMUNITY
End: 2024-06-18

## 2024-06-18 RX ORDER — METOPROLOL SUCCINATE 25 MG/1
25 TABLET, EXTENDED RELEASE ORAL
Qty: 90 | Refills: 3 | Status: ACTIVE | COMMUNITY
Start: 1900-01-01 | End: 1900-01-01

## 2024-06-18 RX ORDER — ATORVASTATIN CALCIUM 20 MG/1
20 TABLET, FILM COATED ORAL
Qty: 1 | Refills: 3 | Status: ACTIVE | COMMUNITY
Start: 2023-06-13 | End: 1900-01-01

## 2024-06-18 RX ORDER — TAMSULOSIN HYDROCHLORIDE 0.4 MG/1
0.4 CAPSULE ORAL
Qty: 90 | Refills: 3 | Status: ACTIVE | COMMUNITY
Start: 1900-01-01 | End: 1900-01-01

## 2024-06-18 RX ORDER — DULAGLUTIDE 1.5 MG/.5ML
1.5 INJECTION, SOLUTION SUBCUTANEOUS
Qty: 3 | Refills: 3 | Status: ACTIVE | COMMUNITY
Start: 2024-01-22 | End: 1900-01-01

## 2024-06-18 NOTE — HISTORY OF PRESENT ILLNESS
[FreeTextEntry1] : Patient presents for follow up evaluation for multiple medical concerns  [de-identified] : Patient presents for follow up evaluation for multiple medical concerns including: hypertension, diabetes mellitus, hypercholesterolemia.

## 2024-06-21 ENCOUNTER — APPOINTMENT (OUTPATIENT)
Dept: OPHTHALMOLOGY | Facility: CLINIC | Age: 74
End: 2024-06-21
Payer: MEDICARE

## 2024-06-21 ENCOUNTER — NON-APPOINTMENT (OUTPATIENT)
Age: 74
End: 2024-06-21

## 2024-06-21 DIAGNOSIS — G89.29 OTHER CHRONIC PAIN: ICD-10-CM

## 2024-06-21 LAB
ALBUMIN SERPL ELPH-MCNC: 4.6 G/DL
ALP BLD-CCNC: 34 U/L
ALT SERPL-CCNC: 8 U/L
ANION GAP SERPL CALC-SCNC: 13 MMOL/L
AST SERPL-CCNC: 19 U/L
BASOPHILS # BLD AUTO: 0.06 K/UL
BASOPHILS NFR BLD AUTO: 0.9 %
BILIRUB SERPL-MCNC: 0.3 MG/DL
BUN SERPL-MCNC: 22 MG/DL
CALCIUM SERPL-MCNC: 9.7 MG/DL
CHLORIDE SERPL-SCNC: 108 MMOL/L
CHOLEST SERPL-MCNC: 145 MG/DL
CO2 SERPL-SCNC: 20 MMOL/L
CREAT SERPL-MCNC: 1.39 MG/DL
EGFR: 53 ML/MIN/1.73M2
EOSINOPHIL # BLD AUTO: 0.23 K/UL
EOSINOPHIL NFR BLD AUTO: 3.3 %
ESTIMATED AVERAGE GLUCOSE: 134 MG/DL
GLUCOSE SERPL-MCNC: 196 MG/DL
HBA1C MFR BLD HPLC: 6.3 %
HCT VFR BLD CALC: 45.9 %
HDLC SERPL-MCNC: 27 MG/DL
HGB BLD-MCNC: 14.6 G/DL
IMM GRANULOCYTES NFR BLD AUTO: 0.3 %
LDLC SERPL CALC-MCNC: 77 MG/DL
LYMPHOCYTES # BLD AUTO: 2.63 K/UL
LYMPHOCYTES NFR BLD AUTO: 38.3 %
MAN DIFF?: NORMAL
MCHC RBC-ENTMCNC: 27 PG
MCHC RBC-ENTMCNC: 31.8 GM/DL
MCV RBC AUTO: 85 FL
MONOCYTES # BLD AUTO: 0.55 K/UL
MONOCYTES NFR BLD AUTO: 8 %
NEUTROPHILS # BLD AUTO: 3.38 K/UL
NEUTROPHILS NFR BLD AUTO: 49.2 %
NONHDLC SERPL-MCNC: 118 MG/DL
PLATELET # BLD AUTO: 239 K/UL
POTASSIUM SERPL-SCNC: 4.2 MMOL/L
PROT SERPL-MCNC: 7.5 G/DL
PSA SERPL-MCNC: 0.49 NG/ML
RBC # BLD: 5.4 M/UL
RBC # FLD: 14.6 %
SODIUM SERPL-SCNC: 142 MMOL/L
TRIGL SERPL-MCNC: 251 MG/DL
WBC # FLD AUTO: 6.87 K/UL

## 2024-06-21 PROCEDURE — 92012 INTRM OPH EXAM EST PATIENT: CPT

## 2024-06-29 ENCOUNTER — APPOINTMENT (OUTPATIENT)
Dept: ULTRASOUND IMAGING | Facility: CLINIC | Age: 74
End: 2024-06-29

## 2024-06-29 PROCEDURE — 76700 US EXAM ABDOM COMPLETE: CPT

## 2024-07-09 ENCOUNTER — NON-APPOINTMENT (OUTPATIENT)
Age: 74
End: 2024-07-09

## 2024-07-18 DIAGNOSIS — K59.00 CONSTIPATION, UNSPECIFIED: ICD-10-CM

## 2024-09-04 ENCOUNTER — RX RENEWAL (OUTPATIENT)
Age: 74
End: 2024-09-04

## 2024-09-11 ENCOUNTER — RX RENEWAL (OUTPATIENT)
Age: 74
End: 2024-09-11

## 2024-09-27 ENCOUNTER — NON-APPOINTMENT (OUTPATIENT)
Age: 74
End: 2024-09-27

## 2024-09-27 ENCOUNTER — APPOINTMENT (OUTPATIENT)
Dept: CARDIOLOGY | Facility: CLINIC | Age: 74
End: 2024-09-27

## 2024-09-27 VITALS
BODY MASS INDEX: 25.48 KG/M2 | HEIGHT: 69 IN | TEMPERATURE: 97.5 F | HEART RATE: 65 BPM | WEIGHT: 172 LBS | RESPIRATION RATE: 12 BRPM | DIASTOLIC BLOOD PRESSURE: 50 MMHG | OXYGEN SATURATION: 98 % | SYSTOLIC BLOOD PRESSURE: 106 MMHG

## 2024-09-27 DIAGNOSIS — I25.10 ATHEROSCLEROTIC HEART DISEASE OF NATIVE CORONARY ARTERY W/OUT ANGINA PECTORIS: ICD-10-CM

## 2024-09-27 DIAGNOSIS — E78.00 PURE HYPERCHOLESTEROLEMIA, UNSPECIFIED: ICD-10-CM

## 2024-09-27 DIAGNOSIS — I10 ESSENTIAL (PRIMARY) HYPERTENSION: ICD-10-CM

## 2024-09-27 DIAGNOSIS — R06.02 SHORTNESS OF BREATH: ICD-10-CM

## 2024-09-27 PROCEDURE — 93000 ELECTROCARDIOGRAM COMPLETE: CPT

## 2024-09-27 PROCEDURE — 99214 OFFICE O/P EST MOD 30 MIN: CPT | Mod: 25

## 2024-09-27 NOTE — HISTORY OF PRESENT ILLNESS
[FreeTextEntry1] : Bryan Vidal is a 74-year-old male with CAD s/p stents, hypertension, hypercholesterolemia and Diabetes comes for follow up visit. Denies any chest pain or palpitations. Mild shortness of breath on exertion which is relieved with rest in few minutes. Complaint to medications and diet.

## 2024-09-27 NOTE — DISCUSSION/SUMMARY
[FreeTextEntry1] : In a summary Bryan Vidal is an elderly male with CAD s/p stents, stable. Continue Plavix. Hypertension, controlled. Continue Metoprolol and Isosorbide.  Hypercholesterolemia, continue Atorvastatin and low cholesterol diet. LDL goal less than 70 g/dL. Diabetes, on medications and low Carb diet. CAD and shortness of breath will get Echo to assess LV systolic function and wall motion.  Continue healthy lifestyle. Follow up in 6 months.

## 2024-09-27 NOTE — PHYSICAL EXAM
[Normal Conjunctiva] : normal conjunctiva [No Carotid Bruit] : no carotid bruit [Soft] : abdomen soft [Non Tender] : non-tender [Normal Bowel Sounds] : normal bowel sounds [No Edema] : no edema [No Cyanosis] : no cyanosis [Moves all extremities] : moves all extremities [No Focal Deficits] : no focal deficits [Normal Speech] : normal speech [Normal] : alert and oriented, normal memory [de-identified] : walks with cane

## 2024-10-16 ENCOUNTER — APPOINTMENT (OUTPATIENT)
Dept: ENDOCRINOLOGY | Facility: CLINIC | Age: 74
End: 2024-10-16
Payer: MEDICARE

## 2024-10-16 VITALS
OXYGEN SATURATION: 98 % | HEIGHT: 69 IN | WEIGHT: 168 LBS | BODY MASS INDEX: 24.88 KG/M2 | SYSTOLIC BLOOD PRESSURE: 126 MMHG | HEART RATE: 64 BPM | DIASTOLIC BLOOD PRESSURE: 64 MMHG

## 2024-10-16 DIAGNOSIS — E78.00 PURE HYPERCHOLESTEROLEMIA, UNSPECIFIED: ICD-10-CM

## 2024-10-16 DIAGNOSIS — I10 ESSENTIAL (PRIMARY) HYPERTENSION: ICD-10-CM

## 2024-10-16 DIAGNOSIS — E11.9 TYPE 2 DIABETES MELLITUS W/OUT COMPLICATIONS: ICD-10-CM

## 2024-10-16 PROCEDURE — G2211 COMPLEX E/M VISIT ADD ON: CPT

## 2024-10-16 PROCEDURE — 99214 OFFICE O/P EST MOD 30 MIN: CPT

## 2024-10-19 LAB
ESTIMATED AVERAGE GLUCOSE: 163 MG/DL
HBA1C MFR BLD HPLC: 7.3 %

## 2024-11-08 ENCOUNTER — RESULT REVIEW (OUTPATIENT)
Age: 74
End: 2024-11-08

## 2024-11-08 ENCOUNTER — APPOINTMENT (OUTPATIENT)
Dept: CARDIOLOGY | Facility: CLINIC | Age: 74
End: 2024-11-08
Payer: MEDICARE

## 2024-11-08 DIAGNOSIS — I25.10 ATHEROSCLEROTIC HEART DISEASE OF NATIVE CORONARY ARTERY W/OUT ANGINA PECTORIS: ICD-10-CM

## 2024-11-08 DIAGNOSIS — R06.02 SHORTNESS OF BREATH: ICD-10-CM

## 2024-11-08 PROCEDURE — 93306 TTE W/DOPPLER COMPLETE: CPT

## 2024-11-15 ENCOUNTER — APPOINTMENT (OUTPATIENT)
Dept: OPHTHALMOLOGY | Facility: CLINIC | Age: 74
End: 2024-11-15
Payer: MEDICARE

## 2024-11-15 ENCOUNTER — NON-APPOINTMENT (OUTPATIENT)
Age: 74
End: 2024-11-15

## 2024-11-15 PROCEDURE — 92133 CPTRZD OPH DX IMG PST SGM ON: CPT

## 2024-11-15 PROCEDURE — 92012 INTRM OPH EXAM EST PATIENT: CPT | Mod: 25

## 2024-12-03 ENCOUNTER — APPOINTMENT (OUTPATIENT)
Dept: GASTROENTEROLOGY | Facility: CLINIC | Age: 74
End: 2024-12-03
Payer: MEDICARE

## 2024-12-03 VITALS
TEMPERATURE: 97.5 F | OXYGEN SATURATION: 99 % | DIASTOLIC BLOOD PRESSURE: 88 MMHG | HEART RATE: 68 BPM | BODY MASS INDEX: 24.88 KG/M2 | SYSTOLIC BLOOD PRESSURE: 205 MMHG | WEIGHT: 168 LBS | HEIGHT: 69 IN

## 2024-12-03 DIAGNOSIS — K42.9 UMBILICAL HERNIA W/OUT OBSTRUCTION OR GANGRENE: ICD-10-CM

## 2024-12-03 PROCEDURE — 99213 OFFICE O/P EST LOW 20 MIN: CPT

## 2024-12-18 ENCOUNTER — APPOINTMENT (OUTPATIENT)
Dept: CT IMAGING | Facility: CLINIC | Age: 74
End: 2024-12-18
Payer: MEDICARE

## 2024-12-18 PROCEDURE — 74177 CT ABD & PELVIS W/CONTRAST: CPT

## 2024-12-19 ENCOUNTER — APPOINTMENT (OUTPATIENT)
Dept: OPHTHALMOLOGY | Facility: CLINIC | Age: 74
End: 2024-12-19
Payer: MEDICARE

## 2024-12-19 ENCOUNTER — NON-APPOINTMENT (OUTPATIENT)
Age: 74
End: 2024-12-19

## 2024-12-19 PROCEDURE — 92012 INTRM OPH EXAM EST PATIENT: CPT

## 2024-12-20 ENCOUNTER — APPOINTMENT (OUTPATIENT)
Dept: INTERNAL MEDICINE | Facility: CLINIC | Age: 74
End: 2024-12-20

## 2024-12-20 VITALS
HEIGHT: 69 IN | WEIGHT: 170 LBS | DIASTOLIC BLOOD PRESSURE: 78 MMHG | OXYGEN SATURATION: 97 % | HEART RATE: 71 BPM | BODY MASS INDEX: 25.18 KG/M2 | SYSTOLIC BLOOD PRESSURE: 168 MMHG

## 2024-12-20 VITALS — DIASTOLIC BLOOD PRESSURE: 80 MMHG | SYSTOLIC BLOOD PRESSURE: 150 MMHG

## 2024-12-20 DIAGNOSIS — E78.00 PURE HYPERCHOLESTEROLEMIA, UNSPECIFIED: ICD-10-CM

## 2024-12-20 DIAGNOSIS — Z00.00 ENCOUNTER FOR GENERAL ADULT MEDICAL EXAMINATION W/OUT ABNORMAL FINDINGS: ICD-10-CM

## 2024-12-20 DIAGNOSIS — E11.9 TYPE 2 DIABETES MELLITUS W/OUT COMPLICATIONS: ICD-10-CM

## 2024-12-20 DIAGNOSIS — E55.9 VITAMIN D DEFICIENCY, UNSPECIFIED: ICD-10-CM

## 2024-12-20 PROCEDURE — G0439: CPT

## 2024-12-20 PROCEDURE — 90662 IIV NO PRSV INCREASED AG IM: CPT

## 2024-12-20 PROCEDURE — G0008: CPT

## 2024-12-24 LAB
25(OH)D3 SERPL-MCNC: 25.6 NG/ML
ALBUMIN SERPL ELPH-MCNC: 4.6 G/DL
ALP BLD-CCNC: 44 U/L
ALT SERPL-CCNC: 12 U/L
ANION GAP SERPL CALC-SCNC: 14 MMOL/L
AST SERPL-CCNC: 21 U/L
BASOPHILS # BLD AUTO: 0.05 K/UL
BASOPHILS NFR BLD AUTO: 0.7 %
BILIRUB SERPL-MCNC: 0.4 MG/DL
BUN SERPL-MCNC: 20 MG/DL
CALCIUM SERPL-MCNC: 10.2 MG/DL
CHLORIDE SERPL-SCNC: 103 MMOL/L
CHOLEST SERPL-MCNC: 171 MG/DL
CO2 SERPL-SCNC: 26 MMOL/L
CREAT SERPL-MCNC: 1.33 MG/DL
EGFR: 55 ML/MIN/1.73M2
EOSINOPHIL # BLD AUTO: 0.24 K/UL
EOSINOPHIL NFR BLD AUTO: 3.5 %
ESTIMATED AVERAGE GLUCOSE: 183 MG/DL
GLUCOSE SERPL-MCNC: 262 MG/DL
HBA1C MFR BLD HPLC: 8 %
HCT VFR BLD CALC: 50.2 %
HDLC SERPL-MCNC: 36 MG/DL
HGB BLD-MCNC: 15.5 G/DL
IMM GRANULOCYTES NFR BLD AUTO: 0.1 %
LDLC SERPL CALC-MCNC: 83 MG/DL
LYMPHOCYTES # BLD AUTO: 2.81 K/UL
LYMPHOCYTES NFR BLD AUTO: 40.5 %
MAN DIFF?: NORMAL
MCHC RBC-ENTMCNC: 27 PG
MCHC RBC-ENTMCNC: 30.9 G/DL
MCV RBC AUTO: 87.3 FL
MONOCYTES # BLD AUTO: 0.55 K/UL
MONOCYTES NFR BLD AUTO: 7.9 %
NEUTROPHILS # BLD AUTO: 3.28 K/UL
NEUTROPHILS NFR BLD AUTO: 47.3 %
NONHDLC SERPL-MCNC: 135 MG/DL
PLATELET # BLD AUTO: 226 K/UL
POTASSIUM SERPL-SCNC: 4.7 MMOL/L
PROT SERPL-MCNC: 7.4 G/DL
RBC # BLD: 5.75 M/UL
RBC # FLD: 14.6 %
SODIUM SERPL-SCNC: 143 MMOL/L
TRIGL SERPL-MCNC: 317 MG/DL
TSH SERPL-ACNC: 0.4 UIU/ML
VIT B12 SERPL-MCNC: 782 PG/ML
WBC # FLD AUTO: 6.94 K/UL

## 2024-12-27 ENCOUNTER — APPOINTMENT (OUTPATIENT)
Dept: OPHTHALMOLOGY | Facility: CLINIC | Age: 74
End: 2024-12-27

## 2025-01-02 ENCOUNTER — NON-APPOINTMENT (OUTPATIENT)
Age: 75
End: 2025-01-02

## 2025-01-02 ENCOUNTER — APPOINTMENT (OUTPATIENT)
Dept: OPHTHALMOLOGY | Facility: CLINIC | Age: 75
End: 2025-01-02
Payer: MEDICARE

## 2025-01-02 PROCEDURE — 92012 INTRM OPH EXAM EST PATIENT: CPT

## 2025-01-12 PROBLEM — E11.22 TYPE 2 DM WITH CKD STAGE 3 AND HYPERTENSION: Status: ACTIVE | Noted: 2025-01-12

## 2025-01-12 PROBLEM — I51.89 GRADE I DIASTOLIC DYSFUNCTION: Status: ACTIVE | Noted: 2025-01-12

## 2025-01-23 ENCOUNTER — APPOINTMENT (OUTPATIENT)
Dept: GASTROENTEROLOGY | Facility: GI CENTER | Age: 75
End: 2025-01-23

## 2025-02-07 ENCOUNTER — NON-APPOINTMENT (OUTPATIENT)
Age: 75
End: 2025-02-07

## 2025-02-07 ENCOUNTER — APPOINTMENT (OUTPATIENT)
Dept: CARDIOLOGY | Facility: CLINIC | Age: 75
End: 2025-02-07
Payer: MEDICARE

## 2025-02-07 VITALS
HEIGHT: 69 IN | DIASTOLIC BLOOD PRESSURE: 74 MMHG | OXYGEN SATURATION: 99 % | BODY MASS INDEX: 25.03 KG/M2 | HEART RATE: 73 BPM | TEMPERATURE: 98.6 F | WEIGHT: 169 LBS | RESPIRATION RATE: 16 BRPM | SYSTOLIC BLOOD PRESSURE: 131 MMHG

## 2025-02-07 DIAGNOSIS — E78.00 PURE HYPERCHOLESTEROLEMIA, UNSPECIFIED: ICD-10-CM

## 2025-02-07 DIAGNOSIS — I10 ESSENTIAL (PRIMARY) HYPERTENSION: ICD-10-CM

## 2025-02-07 DIAGNOSIS — I25.10 ATHEROSCLEROTIC HEART DISEASE OF NATIVE CORONARY ARTERY W/OUT ANGINA PECTORIS: ICD-10-CM

## 2025-02-07 PROCEDURE — 93000 ELECTROCARDIOGRAM COMPLETE: CPT

## 2025-02-07 PROCEDURE — 99214 OFFICE O/P EST MOD 30 MIN: CPT | Mod: 25

## 2025-02-21 ENCOUNTER — APPOINTMENT (OUTPATIENT)
Dept: ENDOCRINOLOGY | Facility: CLINIC | Age: 75
End: 2025-02-21

## 2025-02-21 VITALS
SYSTOLIC BLOOD PRESSURE: 120 MMHG | OXYGEN SATURATION: 98 % | BODY MASS INDEX: 25.33 KG/M2 | HEART RATE: 68 BPM | WEIGHT: 171 LBS | DIASTOLIC BLOOD PRESSURE: 68 MMHG | HEIGHT: 69 IN

## 2025-02-21 DIAGNOSIS — I10 ESSENTIAL (PRIMARY) HYPERTENSION: ICD-10-CM

## 2025-02-21 DIAGNOSIS — E11.9 TYPE 2 DIABETES MELLITUS W/OUT COMPLICATIONS: ICD-10-CM

## 2025-02-21 DIAGNOSIS — E78.00 PURE HYPERCHOLESTEROLEMIA, UNSPECIFIED: ICD-10-CM

## 2025-02-21 LAB
GLUCOSE BLDC GLUCOMTR-MCNC: 219
HBA1C MFR BLD HPLC: 8.2

## 2025-02-21 PROCEDURE — 82962 GLUCOSE BLOOD TEST: CPT

## 2025-02-21 PROCEDURE — 99214 OFFICE O/P EST MOD 30 MIN: CPT | Mod: 25

## 2025-02-21 PROCEDURE — 83036 HEMOGLOBIN GLYCOSYLATED A1C: CPT | Mod: QW

## 2025-02-21 RX ORDER — DULAGLUTIDE 3 MG/.5ML
3 INJECTION, SOLUTION SUBCUTANEOUS
Qty: 3 | Refills: 0 | Status: ACTIVE | COMMUNITY
Start: 2025-02-21 | End: 1900-01-01

## 2025-02-27 ENCOUNTER — RX RENEWAL (OUTPATIENT)
Age: 75
End: 2025-02-27

## 2025-04-04 ENCOUNTER — NON-APPOINTMENT (OUTPATIENT)
Age: 75
End: 2025-04-04

## 2025-04-04 ENCOUNTER — APPOINTMENT (OUTPATIENT)
Dept: OPHTHALMOLOGY | Facility: CLINIC | Age: 75
End: 2025-04-04
Payer: MEDICARE

## 2025-04-04 PROCEDURE — 92012 INTRM OPH EXAM EST PATIENT: CPT

## 2025-04-18 ENCOUNTER — APPOINTMENT (OUTPATIENT)
Dept: CARDIOLOGY | Facility: CLINIC | Age: 75
End: 2025-04-18
Payer: MEDICARE

## 2025-04-18 VITALS
HEIGHT: 69 IN | DIASTOLIC BLOOD PRESSURE: 80 MMHG | SYSTOLIC BLOOD PRESSURE: 138 MMHG | WEIGHT: 171 LBS | RESPIRATION RATE: 16 BRPM | HEART RATE: 71 BPM | OXYGEN SATURATION: 99 % | BODY MASS INDEX: 25.33 KG/M2 | TEMPERATURE: 98.2 F

## 2025-04-18 DIAGNOSIS — I25.10 ATHEROSCLEROTIC HEART DISEASE OF NATIVE CORONARY ARTERY W/OUT ANGINA PECTORIS: ICD-10-CM

## 2025-04-18 DIAGNOSIS — I10 ESSENTIAL (PRIMARY) HYPERTENSION: ICD-10-CM

## 2025-04-18 DIAGNOSIS — E78.00 PURE HYPERCHOLESTEROLEMIA, UNSPECIFIED: ICD-10-CM

## 2025-04-18 PROCEDURE — 99214 OFFICE O/P EST MOD 30 MIN: CPT

## 2025-05-13 ENCOUNTER — RX RENEWAL (OUTPATIENT)
Age: 75
End: 2025-05-13

## 2025-06-06 ENCOUNTER — APPOINTMENT (OUTPATIENT)
Dept: INTERNAL MEDICINE | Facility: CLINIC | Age: 75
End: 2025-06-06
Payer: MEDICARE

## 2025-06-06 VITALS
HEIGHT: 69 IN | WEIGHT: 170 LBS | SYSTOLIC BLOOD PRESSURE: 113 MMHG | BODY MASS INDEX: 25.18 KG/M2 | DIASTOLIC BLOOD PRESSURE: 76 MMHG | OXYGEN SATURATION: 99 % | HEART RATE: 74 BPM

## 2025-06-06 PROCEDURE — 99214 OFFICE O/P EST MOD 30 MIN: CPT

## 2025-06-06 PROCEDURE — G2211 COMPLEX E/M VISIT ADD ON: CPT

## 2025-06-09 LAB
ALBUMIN SERPL ELPH-MCNC: 4.5 G/DL
ALP BLD-CCNC: 37 U/L
ALT SERPL-CCNC: 15 U/L
ANION GAP SERPL CALC-SCNC: 17 MMOL/L
AST SERPL-CCNC: 20 U/L
BACTERIA UR CULT: NORMAL
BILIRUB SERPL-MCNC: 0.4 MG/DL
BUN SERPL-MCNC: 21 MG/DL
CALCIUM SERPL-MCNC: 9.7 MG/DL
CHLORIDE SERPL-SCNC: 103 MMOL/L
CHOLEST SERPL-MCNC: 112 MG/DL
CO2 SERPL-SCNC: 21 MMOL/L
CREAT SERPL-MCNC: 1.37 MG/DL
CREAT SPEC-SCNC: 40 MG/DL
EGFRCR SERPLBLD CKD-EPI 2021: 53 ML/MIN/1.73M2
ESTIMATED AVERAGE GLUCOSE: 180 MG/DL
GLUCOSE SERPL-MCNC: 238 MG/DL
HBA1C MFR BLD HPLC: 7.9 %
HDLC SERPL-MCNC: 29 MG/DL
LDLC SERPL-MCNC: 53 MG/DL
MICROALBUMIN 24H UR DL<=1MG/L-MCNC: <1.2 MG/DL
MICROALBUMIN/CREAT 24H UR-RTO: NORMAL MG/G
NONHDLC SERPL-MCNC: 84 MG/DL
POTASSIUM SERPL-SCNC: 4.6 MMOL/L
PROT SERPL-MCNC: 7 G/DL
SODIUM SERPL-SCNC: 140 MMOL/L
TRIGL SERPL-MCNC: 183 MG/DL

## 2025-07-01 ENCOUNTER — NON-APPOINTMENT (OUTPATIENT)
Age: 75
End: 2025-07-01

## 2025-07-01 ENCOUNTER — APPOINTMENT (OUTPATIENT)
Dept: OPHTHALMOLOGY | Facility: CLINIC | Age: 75
End: 2025-07-01
Payer: MEDICARE

## 2025-07-01 PROCEDURE — 92014 COMPRE OPH EXAM EST PT 1/>: CPT

## 2025-07-08 ENCOUNTER — APPOINTMENT (OUTPATIENT)
Dept: ENDOCRINOLOGY | Facility: CLINIC | Age: 75
End: 2025-07-08
Payer: MEDICARE

## 2025-07-08 ENCOUNTER — RX RENEWAL (OUTPATIENT)
Age: 75
End: 2025-07-08

## 2025-07-08 VITALS
DIASTOLIC BLOOD PRESSURE: 60 MMHG | HEART RATE: 75 BPM | OXYGEN SATURATION: 97 % | SYSTOLIC BLOOD PRESSURE: 100 MMHG | HEIGHT: 69 IN | WEIGHT: 166 LBS | BODY MASS INDEX: 24.59 KG/M2

## 2025-07-08 PROCEDURE — G2211 COMPLEX E/M VISIT ADD ON: CPT

## 2025-07-08 PROCEDURE — 99214 OFFICE O/P EST MOD 30 MIN: CPT

## 2025-07-18 ENCOUNTER — NON-APPOINTMENT (OUTPATIENT)
Age: 75
End: 2025-07-18

## 2025-07-18 ENCOUNTER — APPOINTMENT (OUTPATIENT)
Dept: OPHTHALMOLOGY | Facility: CLINIC | Age: 75
End: 2025-07-18
Payer: MEDICARE

## 2025-07-18 PROCEDURE — 92012 INTRM OPH EXAM EST PATIENT: CPT

## 2025-08-22 ENCOUNTER — RX RENEWAL (OUTPATIENT)
Age: 75
End: 2025-08-22

## 2025-08-25 ENCOUNTER — RX RENEWAL (OUTPATIENT)
Age: 75
End: 2025-08-25

## 2025-08-27 ENCOUNTER — RX RENEWAL (OUTPATIENT)
Age: 75
End: 2025-08-27

## (undated) DEVICE — TUBING IV SET GRAVITY 3Y 100" MACRO

## (undated) DEVICE — BASIN EMESIS 10IN GRADUATED MAUVE

## (undated) DEVICE — ELCTR GROUNDING PAD ADULT COVIDIEN

## (undated) DEVICE — BIOPSY FORCEP RADIAL JAW 4 STANDARD WITH NEEDLE

## (undated) DEVICE — DRSG 2X2

## (undated) DEVICE — CONTAINER FORMALIN 80ML YELLOW

## (undated) DEVICE — DRSG CURITY GAUZE SPONGE 4 X 4" 12-PLY NON-STERILE

## (undated) DEVICE — BIOPSY FORCEP COLD DISP

## (undated) DEVICE — GOWN LG

## (undated) DEVICE — TUBING SUCTION NONCONDUCTIVE 6MM X 12FT

## (undated) DEVICE — LUBRICATING JELLY HR ONE SHOT 3G

## (undated) DEVICE — SALIVA EJECTOR (BLUE)

## (undated) DEVICE — PACK IV START WITH CHG

## (undated) DEVICE — FACESHIELD FULL VISOR

## (undated) DEVICE — ELCTR ECG CONDUCTIVE ADHESIVE

## (undated) DEVICE — DRSG BANDAID 0.75X3"

## (undated) DEVICE — CATH IV SAFE BC 22G X 1" (BLUE)

## (undated) DEVICE — LINE BREATHE SAMPLNG

## (undated) DEVICE — TUBING MEDI-VAC W MAXIGRIP CONNECTORS 1/4"X6'